# Patient Record
Sex: MALE | Race: WHITE | NOT HISPANIC OR LATINO | Employment: OTHER | URBAN - NONMETROPOLITAN AREA
[De-identification: names, ages, dates, MRNs, and addresses within clinical notes are randomized per-mention and may not be internally consistent; named-entity substitution may affect disease eponyms.]

---

## 2017-03-01 ENCOUNTER — OFFICE VISIT (OUTPATIENT)
Dept: FAMILY MEDICINE CLINIC | Facility: CLINIC | Age: 72
End: 2017-03-01

## 2017-03-01 VITALS
BODY MASS INDEX: 34.53 KG/M2 | HEART RATE: 53 BPM | RESPIRATION RATE: 18 BRPM | HEIGHT: 68 IN | OXYGEN SATURATION: 98 % | SYSTOLIC BLOOD PRESSURE: 130 MMHG | TEMPERATURE: 97.5 F | WEIGHT: 227.8 LBS | DIASTOLIC BLOOD PRESSURE: 82 MMHG

## 2017-03-01 DIAGNOSIS — E66.09 NON MORBID OBESITY DUE TO EXCESS CALORIES: ICD-10-CM

## 2017-03-01 DIAGNOSIS — I10 BENIGN ESSENTIAL HTN: ICD-10-CM

## 2017-03-01 DIAGNOSIS — Z11.59 NEED FOR HEPATITIS C SCREENING TEST: ICD-10-CM

## 2017-03-01 DIAGNOSIS — Z95.1 H/O HEART BYPASS SURGERY: ICD-10-CM

## 2017-03-01 DIAGNOSIS — E78.5 HYPERLIPIDEMIA, UNSPECIFIED HYPERLIPIDEMIA TYPE: ICD-10-CM

## 2017-03-01 DIAGNOSIS — E11.9 TYPE 2 DIABETES MELLITUS WITH HEMOGLOBIN A1C GOAL OF LESS THAN 7.0% (HCC): Primary | ICD-10-CM

## 2017-03-01 LAB — HBA1C MFR BLD: 8 %

## 2017-03-01 PROCEDURE — 83036 HEMOGLOBIN GLYCOSYLATED A1C: CPT | Performed by: FAMILY MEDICINE

## 2017-03-01 PROCEDURE — 99214 OFFICE O/P EST MOD 30 MIN: CPT | Performed by: FAMILY MEDICINE

## 2017-03-01 NOTE — PATIENT INSTRUCTIONS
Diabetes and Foot Care  Diabetes may cause you to have problems because of poor blood supply (circulation) to your feet and legs. This may cause the skin on your feet to become thinner, break easier, and heal more slowly. Your skin may become dry, and the skin may peel and crack. You may also have nerve damage in your legs and feet causing decreased feeling in them. You may not notice minor injuries to your feet that could lead to infections or more serious problems. Taking care of your feet is one of the most important things you can do for yourself.  HOME CARE INSTRUCTIONS  · Wear shoes at all times, even in the house. Do not go barefoot. Bare feet are easily injured.  · Check your feet daily for blisters, cuts, and redness. If you cannot see the bottom of your feet, use a mirror or ask someone for help.  · Wash your feet with warm water (do not use hot water) and mild soap. Then pat your feet and the areas between your toes until they are completely dry. Do not soak your feet as this can dry your skin.  · Apply a moisturizing lotion or petroleum jelly (that does not contain alcohol and is unscented) to the skin on your feet and to dry, brittle toenails. Do not apply lotion between your toes.  · Trim your toenails straight across. Do not dig under them or around the cuticle. File the edges of your nails with an emery board or nail file.  · Do not cut corns or calluses or try to remove them with medicine.  · Wear clean socks or stockings every day. Make sure they are not too tight. Do not wear knee-high stockings since they may decrease blood flow to your legs.  · Wear shoes that fit properly and have enough cushioning. To break in new shoes, wear them for just a few hours a day. This prevents you from injuring your feet. Always look in your shoes before you put them on to be sure there are no objects inside.  · Do not cross your legs. This may decrease the blood flow to your feet.  · If you find a minor scrape,  cut, or break in the skin on your feet, keep it and the skin around it clean and dry. These areas may be cleansed with mild soap and water. Do not cleanse the area with peroxide, alcohol, or iodine.  · When you remove an adhesive bandage, be sure not to damage the skin around it.  · If you have a wound, look at it several times a day to make sure it is healing.  · Do not use heating pads or hot water bottles. They may burn your skin. If you have lost feeling in your feet or legs, you may not know it is happening until it is too late.  · Make sure your health care provider performs a complete foot exam at least annually or more often if you have foot problems. Report any cuts, sores, or bruises to your health care provider immediately.  SEEK MEDICAL CARE IF:   · You have an injury that is not healing.  · You have cuts or breaks in the skin.  · You have an ingrown nail.  · You notice redness on your legs or feet.  · You feel burning or tingling in your legs or feet.  · You have pain or cramps in your legs and feet.  · Your legs or feet are numb.  · Your feet always feel cold.  SEEK IMMEDIATE MEDICAL CARE IF:   · There is increasing redness, swelling, or pain in or around a wound.  · There is a red line that goes up your leg.  · Pus is coming from a wound.  · You develop a fever or as directed by your health care provider.  · You notice a bad smell coming from an ulcer or wound.     This information is not intended to replace advice given to you by your health care provider. Make sure you discuss any questions you have with your health care provider.     Document Released: 12/15/2001 Document Revised: 11/28/2016 Document Reviewed: 05/27/2014  [employer] Interactive Patient Education ©2016 [employer]  Sitagliptin oral tablet  What is this medicine?  SITAGLIPTIN (sit a GLIP tin) helps to treat type 2 diabetes. It helps to control blood sugar. Treatment is combined with diet and exercise.  This medicine may be used for  other purposes; ask your health care provider or pharmacist if you have questions.  COMMON BRAND NAME(S): Januvia  What should I tell my health care provider before I take this medicine?  They need to know if you have any of these conditions:  -diabetic ketoacidosis  -kidney disease  -pancreatitis  -previous swelling of the tongue, face, or lips with difficulty breathing, difficulty swallowing, hoarseness, or tightening of the throat  -type 1 diabetes  -an unusual or allergic reaction to sitagliptin, other medicines, foods, dyes, or preservatives  -pregnant or trying to get pregnant  -breast-feeding  How should I use this medicine?  Take this medicine by mouth with a glass of water. Follow the directions on the prescription label. You can take it with or without food. Do not cut, crush or chew this medicine. Take your dose at the same time each day. Do not take more often than directed. Do not stop taking except on your doctor's advice.  Talk to your pediatrician regarding the use of this medicine in children. Special care may be needed.  Overdosage: If you think you have taken too much of this medicine contact a poison control center or emergency room at once.  NOTE: This medicine is only for you. Do not share this medicine with others.  What if I miss a dose?  If you miss a dose, take it as soon as you can. If it is almost time for your next dose, take only that dose. Do not take double or extra doses.  What may interact with this medicine?  Do not take this medicine with any of the following medications:  -gatifloxacin  This medicine may also interact with the following medications:  -alcohol  -digoxin  -insulin  -sulfonylureas like glimepiride, glipizide, glyburide  This list may not describe all possible interactions. Give your health care provider a list of all the medicines, herbs, non-prescription drugs, or dietary supplements you use. Also tell them if you smoke, drink alcohol, or use illegal drugs. Some  items may interact with your medicine.  What should I watch for while using this medicine?  Visit your doctor or health care professional for regular checks on your progress.  A test called the HbA1C (A1C) will be monitored. This is a simple blood test. It measures your blood sugar control over the last 2 to 3 months. You will receive this test every 3 to 6 months.  Learn how to check your blood sugar. Learn the symptoms of low and high blood sugar and how to manage them.  Always carry a quick-source of sugar with you in case you have symptoms of low blood sugar. Examples include hard sugar candy or glucose tablets. Make sure others know that you can choke if you eat or drink when you develop serious symptoms of low blood sugar, such as seizures or unconsciousness. They must get medical help at once.  Tell your doctor or health care professional if you have high blood sugar. You might need to change the dose of your medicine. If you are sick or exercising more than usual, you might need to change the dose of your medicine.  Do not skip meals. Ask your doctor or health care professional if you should avoid alcohol. Many nonprescription cough and cold products contain sugar or alcohol. These can affect blood sugar.  Wear a medical ID bracelet or chain, and carry a card that describes your disease and details of your medicine and dosage times.  What side effects may I notice from receiving this medicine?  Side effects that you should report to your doctor or health care professional as soon as possible:  -allergic reactions like skin rash, itching or hives, swelling of the face, lips, or tongue  -breathing problems  -fever, chills  -joint pain  -loss of appetite  -signs and symptoms of low blood sugar such as feeling anxious, confusion, dizziness, increased hunger, unusually weak or tired, sweating, shakiness, cold, irritable, headache, blurred vision, fast heartbeat, loss of consciousness  -unusual stomach pain or  discomfort  -vomiting  Side effects that usually do not require medical attention (report to your doctor or health care professional if they continue or are bothersome):  -diarrhea  -headache  -sore throat  -stomach upset  -stuffy or runny nose  This list may not describe all possible side effects. Call your doctor for medical advice about side effects. You may report side effects to FDA at 6-668-UZC-5915.  Where should I keep my medicine?  Keep out of the reach of children.  Store at room temperature between 15 and 30 degrees C (59 and 86 degrees F). Throw away any unused medicine after the expiration date.  NOTE: This sheet is a summary. It may not cover all possible information. If you have questions about this medicine, talk to your doctor, pharmacist, or health care provider.     © 2016, [employer]. (2015-08-28 17:46:21)    Exercising to Lose Weight  Exercising can help you to lose weight. In order to lose weight through exercise, you need to do vigorous-intensity exercise. You can tell that you are exercising with vigorous intensity if you are breathing very hard and fast and cannot hold a conversation while exercising.  Moderate-intensity exercise helps to maintain your current weight. You can tell that you are exercising at a moderate level if you have a higher heart rate and faster breathing, but you are still able to hold a conversation.  HOW OFTEN SHOULD I EXERCISE?  Choose an activity that you enjoy and set realistic goals. Your health care provider can help you to make an activity plan that works for you. Exercise regularly as directed by your health care provider. This may include:  · Doing resistance training twice each week, such as:    Push-ups.    Sit-ups.    Lifting weights.    Using resistance bands.  · Doing a given intensity of exercise for a given amount of time. Choose from these options:    150 minutes of moderate-intensity exercise every week.    75 minutes of vigorous-intensity  exercise every week.    A mix of moderate-intensity and vigorous-intensity exercise every week.  Children, pregnant women, people who are out of shape, people who are overweight, and older adults may need to consult a health care provider for individual recommendations. If you have any sort of medical condition, be sure to consult your health care provider before starting a new exercise program.  WHAT ARE SOME ACTIVITIES THAT CAN HELP ME TO LOSE WEIGHT?   · Walking at a rate of at least 4.5 miles an hour.  · Jogging or running at a rate of 5 miles per hour.  · Biking at a rate of at least 10 miles per hour.  · Lap swimming.  · Roller-skating or in-line skating.  · Cross-country skiing.  · Vigorous competitive sports, such as football, basketball, and soccer.  · Jumping rope.  · Aerobic dancing.  HOW CAN I BE MORE ACTIVE IN MY DAY-TO-DAY ACTIVITIES?  · Use the stairs instead of the elevator.  · Take a walk during your lunch break.  · If you drive, park your car farther away from work or school.  · If you take public transportation, get off one stop early and walk the rest of the way.  · Make all of your phone calls while standing up and walking around.  · Get up, stretch, and walk around every 30 minutes throughout the day.  WHAT GUIDELINES SHOULD I FOLLOW WHILE EXERCISING?  · Do not exercise so much that you hurt yourself, feel dizzy, or get very short of breath.  · Consult your health care provider prior to starting a new exercise program.  · Wear comfortable clothes and shoes with good support.  · Drink plenty of water while you exercise to prevent dehydration or heat stroke. Body water is lost during exercise and must be replaced.  · Work out until you breathe faster and your heart beats faster.     This information is not intended to replace advice given to you by your health care provider. Make sure you discuss any questions you have with your health care provider.     Document Released: 01/20/2012 Document  Revised: 01/08/2016 Document Reviewed: 05/21/2015  [employer] Interactive Patient Education ©2016 [employer]  Calorie Counting for Weight Loss  Calories are energy you get from the things you eat and drink. Your body uses this energy to keep you going throughout the day. The number of calories you eat affects your weight. When you eat more calories than your body needs, your body stores the extra calories as fat. When you eat fewer calories than your body needs, your body burns fat to get the energy it needs.  Calorie counting means keeping track of how many calories you eat and drink each day. If you make sure to eat fewer calories than your body needs, you should lose weight. In order for calorie counting to work, you will need to eat the number of calories that are right for you in a day to lose a healthy amount of weight per week. A healthy amount of weight to lose per week is usually 1-2 lb (0.5-0.9 kg). A dietitian can determine how many calories you need in a day and give you suggestions on how to reach your calorie goal.   WHAT IS MY MY PLAN?  My goal is to have __________ calories per day.   If I have this many calories per day, I should lose around __________ pounds per week.  WHAT DO I NEED TO KNOW ABOUT CALORIE COUNTING?  In order to meet your daily calorie goal, you will need to:  · Find out how many calories are in each food you would like to eat. Try to do this before you eat.  · Decide how much of the food you can eat.  · Write down what you ate and how many calories it had. Doing this is called keeping a food log.  WHERE DO I FIND CALORIE INFORMATION?  The number of calories in a food can be found on a Nutrition Facts label. Note that all the information on a label is based on a specific serving of the food. If a food does not have a Nutrition Facts label, try to look up the calories online or ask your dietitian for help.  HOW DO I DECIDE HOW MUCH TO EAT?  To decide how much of the food you can  eat, you will need to consider both the number of calories in one serving and the size of one serving. This information can be found on the Nutrition Facts label. If a food does not have a Nutrition Facts label, look up the information online or ask your dietitian for help.  Remember that calories are listed per serving. If you choose to have more than one serving of a food, you will have to multiply the calories per serving by the amount of servings you plan to eat. For example, the label on a package of bread might say that a serving size is 1 slice and that there are 90 calories in a serving. If you eat 1 slice, you will have eaten 90 calories. If you eat 2 slices, you will have eaten 180 calories.  HOW DO I KEEP A FOOD LOG?  After each meal, record the following information in your food log:  · What you ate.  · How much of it you ate.  · How many calories it had.  · Then, add up your calories.  Keep your food log near you, such as in a small notebook in your pocket. Another option is to use a mobile liz or website. Some programs will calculate calories for you and show you how many calories you have left each time you add an item to the log.  WHAT ARE SOME CALORIE COUNTING TIPS?  · Use your calories on foods and drinks that will fill you up and not leave you hungry. Some examples of this include foods like nuts and nut butters, vegetables, lean proteins, and high-fiber foods (more than 5 g fiber per serving).  · Eat nutritious foods and avoid empty calories. Empty calories are calories you get from foods or beverages that do not have many nutrients, such as candy and soda. It is better to have a nutritious high-calorie food (such as an avocado) than a food with few nutrients (such as a bag of chips).  · Know how many calories are in the foods you eat most often. This way, you do not have to look up how many calories they have each time you eat them.  · Look out for foods that may seem like low-calorie foods but  are really high-calorie foods, such as baked goods, soda, and fat-free candy.  · Pay attention to calories in drinks. Drinks such as sodas, specialty coffee drinks, alcohol, and juices have a lot of calories yet do not fill you up. Choose low-calorie drinks like water and diet drinks.  · Focus your calorie counting efforts on higher calorie items. Logging the calories in a garden salad that contains only vegetables is less important than calculating the calories in a milk shake.  · Find a way of tracking calories that works for you. Get creative. Most people who are successful find ways to keep track of how much they eat in a day, even if they do not count every calorie.  WHAT ARE SOME PORTION CONTROL TIPS?  · Know how many calories are in a serving. This will help you know how many servings of a certain food you can have.  · Use a measuring cup to measure serving sizes. This is helpful when you start out. With time, you will be able to estimate serving sizes for some foods.  · Take some time to put servings of different foods on your favorite plates, bowls, and cups so you know what a serving looks like.  · Try not to eat straight from a bag or box. Doing this can lead to overeating. Put the amount you would like to eat in a cup or on a plate to make sure you are eating the right portion.  · Use smaller plates, glasses, and bowls to prevent overeating. This is a quick and easy way to practice portion control. If your plate is smaller, less food can fit on it.  · Try not to multitask while eating, such as watching TV or using your computer. If it is time to eat, sit down at a table and enjoy your food. Doing this will help you to start recognizing when you are full. It will also make you more aware of what and how much you are eating.  HOW CAN I CALORIE COUNT WHEN EATING OUT?  · Ask for smaller portion sizes or child-sized portions.  · Consider sharing an entree and sides instead of getting your own entree.  · If  "you get your own entree, eat only half. Ask for a box at the beginning of your meal and put the rest of your entree in it so you are not tempted to eat it.  · Look for the calories on the menu. If calories are listed, choose the lower calorie options.  · Choose dishes that include vegetables, fruits, whole grains, low-fat dairy products, and lean protein. Focusing on smart food choices from each of the 5 food groups can help you stay on track at restaurants.  · Choose items that are boiled, broiled, grilled, or steamed.  · Choose water, milk, unsweetened iced tea, or other drinks without added sugars. If you want an alcoholic beverage, choose a lower calorie option. For example, a regular gaye can have up to 700 calories and a glass of wine has around 150.  · Stay away from items that are buttered, battered, fried, or served with cream sauce. Items labeled \"crispy\" are usually fried, unless stated otherwise.  · Ask for dressings, sauces, and syrups on the side. These are usually very high in calories, so do not eat much of them.  · Watch out for salads. Many people think salads are a healthy option, but this is often not the case. Many salads come with cota, fried chicken, lots of cheese, fried chips, and dressing. All of these items have a lot of calories. If you want a salad, choose a garden salad and ask for grilled meats or steak. Ask for the dressing on the side, or ask for olive oil and vinegar or lemon to use as dressing.  · Estimate how many servings of a food you are given. For example, a serving of cooked rice is ½ cup or about the size of half a tennis ball or one cupcake wrapper. Knowing serving sizes will help you be aware of how much food you are eating at restaurants. The list below tells you how big or small some common portion sizes are based on everyday objects.    1 oz--4 stacked dice.    3 oz--1 deck of cards.    1 tsp--1 dice.    1 Tbsp--½ a Ping-Pong ball.    2 Tbsp--1 Ping-Pong ball.   "  ½ cup--1 tennis ball or 1 cupcake wrapper.    1 cup--1 baseball.     This information is not intended to replace advice given to you by your health care provider. Make sure you discuss any questions you have with your health care provider.     Document Released: 12/18/2006 Document Revised: 01/08/2016 Document Reviewed: 10/23/2014  [employer] Interactive Patient Education ©2016 [employer]

## 2017-03-01 NOTE — PROGRESS NOTES
Chief Complaint   Patient presents with   • Follow-up     patient said there were no problems today.        History:  Cordell Carreon is a 71 y.o. male presents who today for evaluation of the above problems.  PCP currently listed as Domenico Hui MD.   DM:  A1C today is 8.0.  Mostly in 170s at home.   We discussed today options for glucose control. Discussed sulfonylureas, discussed januvia and jardiance. Discussed injectable agents tanzeum and trulicity as well.  He is not ready for shots.  Weight is stable.  Up ~1 lb from last OV.  He has chosen to try januvia. R/B/A to meds d/w patient, SE reviewed, handout offered regarding medications listed.     He is on ace-I.  Eye MD appt in past 12 months 05-06/2017. Dr. Valenzuela.  This was 5/31/16.  He will be due again in may of this year.  Pneumonia vaccine is up to date.  BP is stable at <140/90.  He is on high dose lipitor.  Baby ASA.  BB is present.  Intermittent aleve.  Obesity remains present.  HTN stable, Hyperlipidemia stable, DM unstable.     Cordell Carreon  has a past medical history of Diabetes mellitus; Hyperlipidemia; and Hypertension.    No Known Allergies  Past Medical History   Diagnosis Date   • Diabetes mellitus    • Hyperlipidemia    • Hypertension      Past Surgical History   Procedure Laterality Date   • Coronary artery bypass graft       Family History   Problem Relation Age of Onset   • Dementia Mother    • Heart attack Father    • No Known Problems Sister    • No Known Problems Daughter    • No Known Problems Son    • No Known Problems Maternal Grandmother    • Heart attack Maternal Grandfather    • No Known Problems Paternal Grandmother    • No Known Problems Paternal Grandfather          Current Outpatient Prescriptions:   •  amLODIPine-benazepril (LOTREL) 10-20 MG per capsule, Daily., Disp: , Rfl:   •  aspirin 81 MG EC tablet, Take 81 mg by mouth Daily., Disp: , Rfl:   •  atorvastatin (LIPITOR) 80 MG tablet, Daily., Disp: , Rfl:   •   "metFORMIN (GLUCOPHAGE) 1000 MG tablet, TAKE ONE TABLET BY MOUTH TWICE DAILY, Disp: 180 tablet, Rfl: 0  •  metoprolol tartrate (LOPRESSOR) 50 MG tablet, 2 (Two) Times a Day., Disp: , Rfl:   •  naproxen sodium (ALEVE) 220 MG tablet, Take 220 mg by mouth Daily., Disp: , Rfl:   •  SITagliptin (JANUVIA) 100 MG tablet, Take 1 tablet by mouth Daily., Disp: 30 tablet, Rfl: 5    ROS:  Review of Systems   Constitutional: Negative for activity change, appetite change and chills.   HENT: Negative for congestion, dental problem and drooling.    Eyes: Negative for pain, discharge and itching.   Respiratory: Negative for apnea, choking and chest tightness.    Cardiovascular: Negative for chest pain, palpitations and leg swelling.   Gastrointestinal: Negative for abdominal distention, abdominal pain and anal bleeding.   Endocrine: Negative for cold intolerance, heat intolerance and polydipsia.   Genitourinary: Negative for difficulty urinating, dysuria and enuresis.   Musculoskeletal: Negative for arthralgias, back pain and gait problem.   Skin: Negative for color change, pallor and rash.   Neurological: Negative for dizziness, facial asymmetry and headaches.   Hematological: Negative for adenopathy. Does not bruise/bleed easily.   Psychiatric/Behavioral: Negative for agitation, behavioral problems and confusion.       OBJECTIVE:  Visit Vitals   • /82 (BP Location: Left arm, Patient Position: Sitting, Cuff Size: Large Adult)   • Pulse 53   • Temp 97.5 °F (36.4 °C) (Oral)   • Resp 18   • Ht 68\" (172.7 cm)   • Wt 227 lb 12.8 oz (103 kg)   • SpO2 98%   • BMI 34.64 kg/m2      Physical Exam   Constitutional: He is oriented to person, place, and time. He appears well-developed and well-nourished.   HENT:   Head: Normocephalic and atraumatic.   Right Ear: External ear normal.   Left Ear: External ear normal.   Nose: Nose normal.   Mouth/Throat: Oropharynx is clear and moist.   Eyes: Conjunctivae and EOM are normal. Pupils are equal, " round, and reactive to light.   Neck: Normal range of motion. Neck supple. No thyromegaly present.   Cardiovascular: Normal rate, regular rhythm, normal heart sounds and intact distal pulses.    No murmur heard.  Pulmonary/Chest: Effort normal and breath sounds normal. No respiratory distress. He has no wheezes. He exhibits no tenderness.   Abdominal: Soft. Bowel sounds are normal. There is no tenderness. There is no rebound and no guarding.   Musculoskeletal: Normal range of motion. He exhibits no tenderness.    Cordell JAMIL had a diabetic foot exam performed today.   During the foot exam he had a monofilament test performed.    Vascular Status -  His exam exhibits right foot vasculature normal. His exam exhibits no right foot edema. His exam exhibits left foot vasculature normal. His exam exhibits no left foot edema.   Skin Integrity  -  His right foot skin is intact.  He has right foot warmth.  He has no right foot onychomycosis, no right foot ulcer, non-callous right foot,  no ingrown toenail on right foot, right heel is not dry and cracked and no right foot blister.    Cordell JAMIL 's left foot skin is intact. He has left foot warmth. He has no left foot onychomycosis, no left foot ulcer, non-callous left foot, no left ingrown toenail, no left heel dry and cracked and no left foot blister..   Foot Structure and Biomechanics -  His right foot has no hallux valgus, no claw toes and no hammer toes present. His left foot has no hallux valgus, no claw toes and no hammer toes.      Lymphadenopathy:     He has no cervical adenopathy.   Neurological: He is alert and oriented to person, place, and time. No cranial nerve deficit. Coordination normal.   Skin: Skin is warm and dry. No rash noted.   Psychiatric: He has a normal mood and affect. His behavior is normal. Judgment and thought content normal.   Nursing note and vitals reviewed.    Diabetic foot exam:   Left: Reflexes 2+    Vibratory sensation normal   Proprioception  normal   Sharp/dull discrimination normal   Filament test present 10/10 normal  Right: Reflexes 2+    Vibratory sensation normal   Proprioception normal   Sharp/dull discrimination normal   Filament test present  10/10 normal      Assessment/Plan:  Diabetes Type 2:  poorly controlled, needs to follow diet more regularly and a1c 8.0.. Insulin non dependent. Nephropathy, Retinopathy, Neuropahty status discussed.  Reviewed goals of Diabetes today.  The Goal is to have an Hgb A1C <7.0 for most patients.  Good BP control is encouraged with Goal BP based on JNC 8 guidelines 2014 of SBP <140 and DBP <90.  At goal: yes.  Discussed role of Ace-I and ARB with DM.  DM imparts risk equivalence for CAD based on ATP III.  Current guidelines support moderate intensity statin with goal of 30-50% reduction in LDL unless 10 yr risk ASCVD >7.5 then high intensity should be used. Close monitoring of Lipid levels encouraged. Recommend once yearly eye evaluation by optometry or ophthalmology.  Good foot health discussed and foot exam completed today and listed in examination.  Recommended to monitor toe health, wear good shoes, cut nails straight across and tend calluses as listed.  Take medications as encouraged.  Monitor blood sugars as encouraged and bring log to future meetings. Weight needs to be monitored. Monitor portions and caloric intake.    · Pneumovax frequency discussed.    · Patients <65 years old should have PPSV 23 q 5 years x 2 up to age 65 then once after age 65.    · Prevnar at age 65 and then 1 year later PPSV 23.    · Recent data suggest there may be a link between metformin and B12.  D/W patient to consider evaluation for b12 deficiency.  · Recommend yearly microalbumin  · Yearly eye evaluation  · Discussed importance of Ace-I and ARB  · Monitor portions  · Work on regular exercise and diet to keep BMI in appropriate range.   · Lab review: orders written for new lab studies as appropriate; see orders.   · See orders  for this visit as documented in the electronic medical record. Diabetic issues reviewed with male: home glucose monitoring emphasized, all medications, side effects and compliance discussed carefully, foot care discussed and Podiatry visits discussed, annual eye examinations at Ophthalmology discussed, glycohemoglobin and other lab monitoring discussed and long term diabetic complications discussed.  Today we discussed this and benefits of oral medications.  We discussed the sulfonylurea we discussed the DPP-IV, SGLT-2, GLP-1.   He is not interested in anything injectable.  We reviewed side effects, we reviewed cost.  We also discussed his weight and recommended an agent that would help him with weight loss.  We talked about Jaridance specifically as well as Januvia specifically for agents that would be beneficial and sulfonylureas lead to weight gain he is already on metformin.  I also specifically talked about tanzeum, and trulicity but again he does not want anything injectable.  Patient shows Januvia.  Samples were given to him today.  Prescription was sent to his pharmacy.  Follow-up in 3 months I would like her blood sugar log in one month.     HTN: Suspect essential HTN. Good BP control is encouraged with Goal BP based on JNC 8 guidelines:  For the general population <60 yr old goal BP <140/90 and for those >60 <150/90.  For patients of all ages with Diabetes, CKD, Known CAD the goal is <140/90. Reviewed typical first line agents to include thiazide diuretic or ace-I or ARB or CCB alone or in combo. At goal yes.  If not already owned, I recommended to the patient to obtain electronic home BP machine with upper arm blood pressure cuff and to check regularly as instructed.  Keep BP log and bring to subsequent visits.    · Offered handout on HTN educational topics: HTN, Low sodium diet.   These were provided if patient requested these today.  · ADA diet encouraged.  · Monitor cholesterol regularly  · Monitor  weight with goal of BMI <30.  · Consider taking Rx at night as recent study supports this may decreased chances of leading to DM.   · Consider bringing home BP cuff to office to compare readings with ours.  · Checking BP at home can help to lower BP.  Certain medications and substances can increase BP to include: NSAIDS, caffeine, decongestants, nicotine.    · Well controlled and BP goal for age is <140/90 per JNC 8 guidelines      Hyperlipidemia: Status unknown.  On high dose lipitor.  Will repeat labs and f/u .    Obesity: Discussed the federal guidelines suggest a healthy goal BMI of 18.5-24.9 for people 18-65 and 23-30 for people age 65 and older.  Overweight is considered BMI 25-30, Obesity 30-40 and Morbid obesity is defined as >100 lb overweight or BMI >40.  With a BMI above goal, it is recommended to utilize a diet/exercise program to get back into the appropriate range.  Consider referral to dietician.  For BMI >40 consider referral to bariatrics.    · If not already monitoring intake.  I would recommend at least to keep a food diary.  Document everything that is consumed into a food diary.  Studies have shown that patients can lose up to 2x the weight by keeping track of foods.    · Offered handout on weight loss techniques.   · Apps that may be of benefit include [employer], Lose it.    · Regular exercise encouraged.  Start with walking 5-10 minutes at a pace that is difficult to carry a conversation. If chest pain/SOA stop and f/u in office.    Declined evaluation by dietitian or by bariatrics.  Portion control highly encouraged    Patient is on aspirin as well as beta blocker although he is not on one of the cardioselective cardioprotective beta blockers.  I would consider switching him to Coreg in the future.        Orders Placed Today:  Cordell JAMIL was seen today for follow-up.    Diagnoses and all orders for this visit:    Type 2 diabetes mellitus with hemoglobin A1c goal of less than 7.0%  -      SITagliptin (JANUVIA) 100 MG tablet; Take 1 tablet by mouth Daily.  -     Comprehensive Metabolic Panel  -     CBC Auto Differential  -     Lipid Panel  -     POC Glycosylated Hemoglobin (Hb A1C)  -     MicroAlbumin, Urine, Random    Need for hepatitis C screening test  -     Hepatitis C Antibody    H/O heart bypass surgery    Benign essential HTN    Hyperlipidemia, unspecified hyperlipidemia type  -     Comprehensive Metabolic Panel  -     Lipid Panel    Other orders  -     Discontinue: SITagliptin (JANUVIA) 100 MG tablet; Take 1 tablet by mouth Daily.        Risks/benefits of current and new medications discussed with the patient and or family today.  The patient/family are aware and accept that if there any side effects they should call or return to clinic as soon as possible.  Appropriate F/U discussed for topics addressed today. All questions were answered to the satisfactory state of patient/family.  Should symptoms fail to improve or worsen they agree to call or return to clinic or to go to the ER. Education handouts were offered on any new Rx if requested.  Discussed the importance of following up with any needed screening tests/labs/specialist appointments and any requested follow-up recommended by me today.  Importance of maintaining follow-up discussed and patient accepts that missed appointments can delay diagnosis and potentially lead to worsening of conditions.    An After Visit Summary was printed and given to the patient at discharge.  Return in about 3 months (around 6/1/2017).         Domenico Hui M.D. 3/2/2017

## 2017-03-02 LAB
ALBUMIN SERPL-MCNC: 4.2 G/DL (ref 3.5–5)
ALBUMIN/GLOB SERPL: 1.4 G/DL (ref 1.1–2.5)
ALP SERPL-CCNC: 70 U/L (ref 24–120)
ALT SERPL-CCNC: 51 U/L (ref 0–54)
AST SERPL-CCNC: 36 U/L (ref 7–45)
BASOPHILS # BLD AUTO: 0.03 10*3/MM3 (ref 0–0.2)
BASOPHILS NFR BLD AUTO: 0.4 % (ref 0–2)
BILIRUB SERPL-MCNC: 1 MG/DL (ref 0.1–1)
BUN SERPL-MCNC: 13 MG/DL (ref 5–21)
BUN/CREAT SERPL: 11.1 (ref 7–25)
CALCIUM SERPL-MCNC: 9.6 MG/DL (ref 8.4–10.4)
CHLORIDE SERPL-SCNC: 103 MMOL/L (ref 98–110)
CHOLEST SERPL-MCNC: 216 MG/DL (ref 130–200)
CO2 SERPL-SCNC: 25 MMOL/L (ref 24–31)
CREAT SERPL-MCNC: 1.17 MG/DL (ref 0.5–1.4)
EOSINOPHIL # BLD AUTO: 0.23 10*3/MM3 (ref 0–0.7)
EOSINOPHIL NFR BLD AUTO: 2.8 % (ref 0–4)
ERYTHROCYTE [DISTWIDTH] IN BLOOD BY AUTOMATED COUNT: 13.6 % (ref 12–15)
GLOBULIN SER CALC-MCNC: 3.1 GM/DL
GLUCOSE SERPL-MCNC: 165 MG/DL (ref 70–100)
HCT VFR BLD AUTO: 41.9 % (ref 40–52)
HDLC SERPL-MCNC: 35 MG/DL
HGB BLD-MCNC: 14.3 G/DL (ref 14–18)
IMM GRANULOCYTES # BLD: 0.02 10*3/MM3 (ref 0–0.03)
IMM GRANULOCYTES NFR BLD: 0.2 % (ref 0–5)
LDLC SERPL CALC-MCNC: 143 MG/DL (ref 0–99)
LYMPHOCYTES # BLD AUTO: 2.48 10*3/MM3 (ref 0.72–4.86)
LYMPHOCYTES NFR BLD AUTO: 30.5 % (ref 15–45)
MCH RBC QN AUTO: 28.5 PG (ref 28–32)
MCHC RBC AUTO-ENTMCNC: 34.1 G/DL (ref 33–36)
MCV RBC AUTO: 83.5 FL (ref 82–95)
MICROALBUMIN UR-MCNC: 9.9 UG/ML
MONOCYTES # BLD AUTO: 0.86 10*3/MM3 (ref 0.19–1.3)
MONOCYTES NFR BLD AUTO: 10.6 % (ref 4–12)
NEUTROPHILS # BLD AUTO: 4.5 10*3/MM3 (ref 1.87–8.4)
NEUTROPHILS NFR BLD AUTO: 55.5 % (ref 39–78)
PLATELET # BLD AUTO: 226 10*3/MM3 (ref 130–400)
POTASSIUM SERPL-SCNC: 4.7 MMOL/L (ref 3.5–5.3)
PROT SERPL-MCNC: 7.3 G/DL (ref 6.3–8.7)
RBC # BLD AUTO: 5.02 10*6/MM3 (ref 4.8–5.9)
SODIUM SERPL-SCNC: 141 MMOL/L (ref 135–145)
TRIGL SERPL-MCNC: 192 MG/DL (ref 0–149)
VLDLC SERPL CALC-MCNC: 38.4 MG/DL
WBC # BLD AUTO: 8.12 10*3/MM3 (ref 4.8–10.8)

## 2017-04-18 ENCOUNTER — OFFICE VISIT (OUTPATIENT)
Dept: GASTROENTEROLOGY | Age: 72
End: 2017-04-18
Payer: MEDICARE

## 2017-04-18 VITALS
HEIGHT: 68 IN | SYSTOLIC BLOOD PRESSURE: 120 MMHG | HEART RATE: 52 BPM | WEIGHT: 227 LBS | DIASTOLIC BLOOD PRESSURE: 78 MMHG | BODY MASS INDEX: 34.4 KG/M2 | OXYGEN SATURATION: 97 %

## 2017-04-18 DIAGNOSIS — Z86.010 HISTORY OF COLON POLYPS: Primary | ICD-10-CM

## 2017-04-18 PROBLEM — Z86.0100 HISTORY OF COLON POLYPS: Status: ACTIVE | Noted: 2017-04-18

## 2017-04-18 PROCEDURE — 3017F COLORECTAL CA SCREEN DOC REV: CPT | Performed by: NURSE PRACTITIONER

## 2017-04-18 PROCEDURE — G8427 DOCREV CUR MEDS BY ELIG CLIN: HCPCS | Performed by: NURSE PRACTITIONER

## 2017-04-18 PROCEDURE — 1036F TOBACCO NON-USER: CPT | Performed by: NURSE PRACTITIONER

## 2017-04-18 PROCEDURE — G8417 CALC BMI ABV UP PARAM F/U: HCPCS | Performed by: NURSE PRACTITIONER

## 2017-04-18 PROCEDURE — 1123F ACP DISCUSS/DSCN MKR DOCD: CPT | Performed by: NURSE PRACTITIONER

## 2017-04-18 PROCEDURE — 99203 OFFICE O/P NEW LOW 30 MIN: CPT | Performed by: NURSE PRACTITIONER

## 2017-04-18 PROCEDURE — 4040F PNEUMOC VAC/ADMIN/RCVD: CPT | Performed by: NURSE PRACTITIONER

## 2017-04-18 RX ORDER — AMLODIPINE BESYLATE AND BENAZEPRIL HYDROCHLORIDE 10; 20 MG/1; MG/1
1 CAPSULE ORAL DAILY
COMMUNITY

## 2017-04-18 ASSESSMENT — ENCOUNTER SYMPTOMS
ABDOMINAL PAIN: 0
NAUSEA: 0
BLOOD IN STOOL: 0
VOICE CHANGE: 0
ABDOMINAL DISTENTION: 0
DIARRHEA: 0
CONSTIPATION: 0
SORE THROAT: 0
CHEST TIGHTNESS: 0
BACK PAIN: 0
VOMITING: 0
COUGH: 0
SHORTNESS OF BREATH: 0
RECTAL PAIN: 0

## 2017-04-28 RX ORDER — ATORVASTATIN CALCIUM 80 MG/1
80 TABLET, FILM COATED ORAL NIGHTLY
Qty: 90 TABLET | Refills: 0 | Status: SHIPPED | OUTPATIENT
Start: 2017-04-28 | End: 2017-07-27 | Stop reason: SDUPTHER

## 2017-04-28 RX ORDER — METOPROLOL TARTRATE 50 MG/1
50 TABLET, FILM COATED ORAL 2 TIMES DAILY
Qty: 180 TABLET | Refills: 0 | Status: SHIPPED | OUTPATIENT
Start: 2017-04-28 | End: 2017-07-27 | Stop reason: SDUPTHER

## 2017-04-28 RX ORDER — AMLODIPINE BESYLATE AND BENAZEPRIL HYDROCHLORIDE 10; 20 MG/1; MG/1
1 CAPSULE ORAL DAILY
Qty: 90 CAPSULE | Refills: 0 | Status: SHIPPED | OUTPATIENT
Start: 2017-04-28 | End: 2017-07-27 | Stop reason: SDUPTHER

## 2017-05-15 ENCOUNTER — ANESTHESIA EVENT (OUTPATIENT)
Dept: OPERATING ROOM | Age: 72
End: 2017-05-15

## 2017-05-18 ENCOUNTER — HOSPITAL ENCOUNTER (OUTPATIENT)
Age: 72
Setting detail: OUTPATIENT SURGERY
Discharge: HOME | End: 2017-05-18
Attending: INTERNAL MEDICINE | Admitting: INTERNAL MEDICINE
Payer: MEDICARE

## 2017-05-18 ENCOUNTER — ANESTHESIA (OUTPATIENT)
Dept: OPERATING ROOM | Age: 72
End: 2017-05-18

## 2017-05-18 VITALS
HEART RATE: 53 BPM | OXYGEN SATURATION: 97 % | BODY MASS INDEX: 33.49 KG/M2 | SYSTOLIC BLOOD PRESSURE: 120 MMHG | RESPIRATION RATE: 14 BRPM | HEIGHT: 68 IN | DIASTOLIC BLOOD PRESSURE: 79 MMHG | WEIGHT: 221 LBS | TEMPERATURE: 98 F

## 2017-05-18 VITALS — SYSTOLIC BLOOD PRESSURE: 110 MMHG | OXYGEN SATURATION: 93 % | DIASTOLIC BLOOD PRESSURE: 60 MMHG

## 2017-05-18 PROCEDURE — G8907 PT DOC NO EVENTS ON DISCHARG: HCPCS

## 2017-05-18 PROCEDURE — G8918 PT W/O PREOP ORDER IV AB PRO: HCPCS

## 2017-05-18 PROCEDURE — G0121 COLON CA SCRN NOT HI RSK IND: HCPCS

## 2017-05-18 PROCEDURE — G0121 COLON CA SCRN NOT HI RSK IND: HCPCS | Performed by: INTERNAL MEDICINE

## 2017-05-18 RX ORDER — MIDAZOLAM HYDROCHLORIDE 1 MG/ML
INJECTION INTRAMUSCULAR; INTRAVENOUS PRN
Status: DISCONTINUED | OUTPATIENT
Start: 2017-05-18 | End: 2017-05-18 | Stop reason: SDUPTHER

## 2017-05-18 RX ORDER — LIDOCAINE HYDROCHLORIDE 10 MG/ML
INJECTION, SOLUTION INFILTRATION; PERINEURAL PRN
Status: DISCONTINUED | OUTPATIENT
Start: 2017-05-18 | End: 2017-05-18 | Stop reason: SDUPTHER

## 2017-05-18 RX ORDER — PROMETHAZINE HYDROCHLORIDE 25 MG/ML
6.25 INJECTION, SOLUTION INTRAMUSCULAR; INTRAVENOUS
Status: DISCONTINUED | OUTPATIENT
Start: 2017-05-18 | End: 2017-05-18 | Stop reason: HOSPADM

## 2017-05-18 RX ORDER — DIPHENHYDRAMINE HYDROCHLORIDE 50 MG/ML
12.5 INJECTION INTRAMUSCULAR; INTRAVENOUS
Status: DISCONTINUED | OUTPATIENT
Start: 2017-05-18 | End: 2017-05-18 | Stop reason: HOSPADM

## 2017-05-18 RX ORDER — SODIUM CHLORIDE 9 MG/ML
INJECTION, SOLUTION INTRAVENOUS CONTINUOUS
Status: DISCONTINUED | OUTPATIENT
Start: 2017-05-18 | End: 2017-05-18 | Stop reason: HOSPADM

## 2017-05-18 RX ORDER — PROPOFOL 10 MG/ML
INJECTION, EMULSION INTRAVENOUS PRN
Status: DISCONTINUED | OUTPATIENT
Start: 2017-05-18 | End: 2017-05-18 | Stop reason: SDUPTHER

## 2017-05-18 RX ORDER — ONDANSETRON 2 MG/ML
4 INJECTION INTRAMUSCULAR; INTRAVENOUS
Status: DISCONTINUED | OUTPATIENT
Start: 2017-05-18 | End: 2017-05-18 | Stop reason: HOSPADM

## 2017-05-18 RX ADMIN — PROPOFOL 100 MG: 10 INJECTION, EMULSION INTRAVENOUS at 09:40

## 2017-05-18 RX ADMIN — SODIUM CHLORIDE: 9 INJECTION, SOLUTION INTRAVENOUS at 09:02

## 2017-05-18 RX ADMIN — LIDOCAINE HYDROCHLORIDE 40 MG: 10 INJECTION, SOLUTION INFILTRATION; PERINEURAL at 09:40

## 2017-05-18 RX ADMIN — MIDAZOLAM HYDROCHLORIDE 2 MG: 1 INJECTION INTRAMUSCULAR; INTRAVENOUS at 09:40

## 2017-05-30 RX ORDER — LANCETS 33 GAUGE
EACH MISCELLANEOUS
Refills: 0 | OUTPATIENT
Start: 2017-05-30

## 2017-06-01 ENCOUNTER — OFFICE VISIT (OUTPATIENT)
Dept: FAMILY MEDICINE CLINIC | Facility: CLINIC | Age: 72
End: 2017-06-01

## 2017-06-01 ENCOUNTER — TELEPHONE (OUTPATIENT)
Dept: FAMILY MEDICINE CLINIC | Facility: CLINIC | Age: 72
End: 2017-06-01

## 2017-06-01 VITALS
BODY MASS INDEX: 34.37 KG/M2 | HEIGHT: 68 IN | WEIGHT: 226.8 LBS | OXYGEN SATURATION: 98 % | SYSTOLIC BLOOD PRESSURE: 112 MMHG | DIASTOLIC BLOOD PRESSURE: 82 MMHG | RESPIRATION RATE: 18 BRPM | HEART RATE: 56 BPM | TEMPERATURE: 97.7 F

## 2017-06-01 VITALS
DIASTOLIC BLOOD PRESSURE: 82 MMHG | HEIGHT: 68 IN | RESPIRATION RATE: 18 BRPM | HEART RATE: 56 BPM | TEMPERATURE: 97.7 F | BODY MASS INDEX: 34.25 KG/M2 | SYSTOLIC BLOOD PRESSURE: 112 MMHG | OXYGEN SATURATION: 98 % | WEIGHT: 226 LBS

## 2017-06-01 DIAGNOSIS — Z00.00 ROUTINE GENERAL MEDICAL EXAMINATION AT HEALTH CARE FACILITY: Primary | ICD-10-CM

## 2017-06-01 DIAGNOSIS — E78.2 MIXED HYPERLIPIDEMIA: ICD-10-CM

## 2017-06-01 DIAGNOSIS — I10 BENIGN ESSENTIAL HTN: ICD-10-CM

## 2017-06-01 DIAGNOSIS — Z11.59 NEED FOR HEPATITIS C SCREENING TEST: ICD-10-CM

## 2017-06-01 DIAGNOSIS — E11.9 TYPE 2 DIABETES MELLITUS WITH HEMOGLOBIN A1C GOAL OF LESS THAN 7.0% (HCC): Primary | ICD-10-CM

## 2017-06-01 LAB — HBA1C MFR BLD: 7.5 %

## 2017-06-01 PROCEDURE — 99214 OFFICE O/P EST MOD 30 MIN: CPT | Performed by: FAMILY MEDICINE

## 2017-06-01 PROCEDURE — G0439 PPPS, SUBSEQ VISIT: HCPCS | Performed by: FAMILY MEDICINE

## 2017-06-01 PROCEDURE — 83036 HEMOGLOBIN GLYCOSYLATED A1C: CPT | Performed by: FAMILY MEDICINE

## 2017-06-01 RX ORDER — GLIMEPIRIDE 2 MG/1
2 TABLET ORAL
Qty: 30 TABLET | Refills: 5 | Status: SHIPPED | OUTPATIENT
Start: 2017-06-01 | End: 2017-11-28 | Stop reason: SDUPTHER

## 2017-06-01 NOTE — PATIENT INSTRUCTIONS
Exercising to Lose Weight  Exercising can help you to lose weight. In order to lose weight through exercise, you need to do vigorous-intensity exercise. You can tell that you are exercising with vigorous intensity if you are breathing very hard and fast and cannot hold a conversation while exercising.  Moderate-intensity exercise helps to maintain your current weight. You can tell that you are exercising at a moderate level if you have a higher heart rate and faster breathing, but you are still able to hold a conversation.  HOW OFTEN SHOULD I EXERCISE?  Choose an activity that you enjoy and set realistic goals. Your health care provider can help you to make an activity plan that works for you. Exercise regularly as directed by your health care provider. This may include:  · Doing resistance training twice each week, such as:    Push-ups.    Sit-ups.    Lifting weights.    Using resistance bands.  · Doing a given intensity of exercise for a given amount of time. Choose from these options:    150 minutes of moderate-intensity exercise every week.    75 minutes of vigorous-intensity exercise every week.    A mix of moderate-intensity and vigorous-intensity exercise every week.  Children, pregnant women, people who are out of shape, people who are overweight, and older adults may need to consult a health care provider for individual recommendations. If you have any sort of medical condition, be sure to consult your health care provider before starting a new exercise program.  WHAT ARE SOME ACTIVITIES THAT CAN HELP ME TO LOSE WEIGHT?   · Walking at a rate of at least 4.5 miles an hour.  · Jogging or running at a rate of 5 miles per hour.  · Biking at a rate of at least 10 miles per hour.  · Lap swimming.  · Roller-skating or in-line skating.  · Cross-country skiing.  · Vigorous competitive sports, such as football, basketball, and soccer.  · Jumping rope.  · Aerobic dancing.  HOW CAN I BE MORE ACTIVE IN MY DAY-TO-DAY  ACTIVITIES?  · Use the stairs instead of the elevator.  · Take a walk during your lunch break.  · If you drive, park your car farther away from work or school.  · If you take public transportation, get off one stop early and walk the rest of the way.  · Make all of your phone calls while standing up and walking around.  · Get up, stretch, and walk around every 30 minutes throughout the day.  WHAT GUIDELINES SHOULD I FOLLOW WHILE EXERCISING?  · Do not exercise so much that you hurt yourself, feel dizzy, or get very short of breath.  · Consult your health care provider prior to starting a new exercise program.  · Wear comfortable clothes and shoes with good support.  · Drink plenty of water while you exercise to prevent dehydration or heat stroke. Body water is lost during exercise and must be replaced.  · Work out until you breathe faster and your heart beats faster.     This information is not intended to replace advice given to you by your health care provider. Make sure you discuss any questions you have with your health care provider.     Document Released: 01/20/2012 Document Revised: 01/08/2016 Document Reviewed: 05/21/2015  [employer] Interactive Patient Education ©2017 [employer]    Fall Prevention in the Home   Falls can cause injuries and can affect people from all age groups. There are many simple things that you can do to make your home safe and to help prevent falls.  WHAT CAN I DO ON THE OUTSIDE OF MY HOME?  · Regularly repair the edges of walkways and driveways and fix any cracks.  · Remove high doorway thresholds.  · Trim any shrubbery on the main path into your home.  · Use bright outdoor lighting.  · Clear walkways of debris and clutter, including tools and rocks.  · Regularly check that handrails are securely fastened and in good repair. Both sides of any steps should have handrails.  · Install guardrails along the edges of any raised decks or porches.  · Have leaves, snow, and ice cleared  regularly.  · Use sand or salt on walkways during winter months.  · In the garage, clean up any spills right away, including grease or oil spills.  WHAT CAN I DO IN THE BATHROOM?  · Use night lights.  · Install grab bars by the toilet and in the tub and shower. Do not use towel bars as grab bars.  · Use non-skid mats or decals on the floor of the tub or shower.  · If you need to sit down while you are in the shower, use a plastic, non-slip stool.  · Keep the floor dry. Immediately clean up any water that spills on the floor.  · Remove soap buildup in the tub or shower on a regular basis.  · Attach bath mats securely with double-sided non-slip rug tape.  · Remove throw rugs and other tripping hazards from the floor.  WHAT CAN I DO IN THE BEDROOM?  · Use night lights.  · Make sure that a bedside light is easy to reach.  · Do not use oversized bedding that drapes onto the floor.  · Have a firm chair that has side arms to use for getting dressed.  · Remove throw rugs and other tripping hazards from the floor.  WHAT CAN I DO IN THE KITCHEN?   · Clean up any spills right away.  · Avoid walking on wet floors.  · Place frequently used items in easy-to-reach places.  · If you need to reach for something above you, use a sturdy step stool that has a grab bar.  · Keep electrical cables out of the way.  · Do not use floor polish or wax that makes floors slippery. If you have to use wax, make sure that it is non-skid floor wax.  · Remove throw rugs and other tripping hazards from the floor.  WHAT CAN I DO IN THE STAIRWAYS?  · Do not leave any items on the stairs.  · Make sure that there are handrails on both sides of the stairs. Fix handrails that are broken or loose. Make sure that handrails are as long as the stairways.  · Check any carpeting to make sure that it is firmly attached to the stairs. Fix any carpet that is loose or worn.  · Avoid having throw rugs at the top or bottom of stairways, or secure the rugs with carpet  tape to prevent them from moving.  · Make sure that you have a light switch at the top of the stairs and the bottom of the stairs. If you do not have them, have them installed.  WHAT ARE SOME OTHER FALL PREVENTION TIPS?  · Wear closed-toe shoes that fit well and support your feet. Wear shoes that have rubber soles or low heels.  · When you use a stepladder, make sure that it is completely opened and that the sides are firmly locked. Have someone hold the ladder while you are using it. Do not climb a closed stepladder.  · Add color or contrast paint or tape to grab bars and handrails in your home. Place contrasting color strips on the first and last steps.  · Use mobility aids as needed, such as canes, walkers, scooters, and crutches.  · Turn on lights if it is dark. Replace any light bulbs that burn out.  · Set up furniture so that there are clear paths. Keep the furniture in the same spot.  · Fix any uneven floor surfaces.  · Choose a carpet design that does not hide the edge of steps of a stairway.  · Be aware of any and all pets.  · Review your medicines with your healthcare provider. Some medicines can cause dizziness or changes in blood pressure, which increase your risk of falling.  Talk with your health care provider about other ways that you can decrease your risk of falls. This may include working with a physical therapist or  to improve your strength, balance, and endurance.     This information is not intended to replace advice given to you by your health care provider. Make sure you discuss any questions you have with your health care provider.     Document Released: 12/08/2003 Document Revised: 04/10/2017 Document Reviewed: 01/22/2016  [employer] Interactive Patient Education ©2017 [email or web address]      Medicare Wellness  Personal Prevention Plan of Service     Date of Office Visit:  06/01/2017  Encounter Provider:  Domenico Hui MD  Place of Service:  [facility]  MEDICINE  Patient Name: Cordell Carreon  :  1945    As part of the Medicare Wellness portion of your visit today, we are providing you with this personalized preventive plan of services (PPPS). This plan is based upon recommendations of the [place] Preventive Services Task Force (USPSTF) and the Advisory Committee on Immunization Practices (ACIP).    This lists the preventive care services that should be considered, and provides dates of when you are due. Items listed as completed are up-to-date and do not require any further intervention.    Health Maintenance   Topic Date Due   • HEPATITIS C SCREENING  2016   • DIABETIC EYE EXAM  2017   • INFLUENZA VACCINE  2017   • DIABETIC FOOT EXAM  2018   • LIPID PANEL  2018   • HEMOGLOBIN A1C  2018   • URINE MICROALBUMIN  2018   • TDAP/TD VACCINES (2 - Td) 2026   • COLONOSCOPY  2027   • ZOSTER VACCINE  Addressed   • PNEUMOCOCCAL VACCINES (65+ LOW/MEDIUM RISK)  Excluded       No orders of the defined types were placed in this encounter.      Return in about 1 year (around 2018).

## 2017-06-01 NOTE — PATIENT INSTRUCTIONS
Glimepiride tablets  What is this medicine?  GLIMEPIRIDE (GLYE me vladimir ride) helps to treat type 2 diabetes. Treatment is combined with diet and exercise. This medicine helps your body use insulin better.  This medicine may be used for other purposes; ask your health care provider or pharmacist if you have questions.  COMMON BRAND NAME(S): Amaryl  What should I tell my health care provider before I take this medicine?  They need to know if you have any of these conditions:  -diabetic ketoacidosis  -glucose-6-phosphate dehydrogenase deficiency  -heart disease  -kidney disease  -liver disease  -severe infection or injury  -thyroid disease  -an unusual or allergic reaction to glimepiride, sulfa drugs, other medicines, foods, dyes, or preservatives  -pregnancy or recent attempts to get pregnant  -breast-feeding  How should I use this medicine?  Take this medicine by mouth. Swallow with a drink of water. Follow the directions on the prescription label. Take your dose at the same time each day, with breakfast or your first large meal. Do not take more often than directed.  Talk to your pediatrician regarding the use of this medicine in children. Special care may be needed.  Elderly patients over 65 years old can have a stronger reaction and need a smaller dose.  Overdosage: If you think you have taken too much of this medicine contact a poison control center or emergency room at once.  NOTE: This medicine is only for you. Do not share this medicine with others.  What if I miss a dose?  If you miss a dose, take it as soon as you can. If it is almost time for your next dose, take only that dose. Do not take double or extra doses.  What may interact with this medicine?  -bosentan  -chloramphenicol  -cisapride  -clarithromycin  -medicines for fungal or yeast infections  -metoclopramide  -probenecid  -warfarin  Many medications may cause an increase or decrease in blood sugar, these include:  -alcohol containing  beverages  -aspirin and aspirin-like drugs  -chloramphenicol  -chromium  -female hormones, like estrogens or progestins and birth control pills  -fluoxetine  -heart medicines like disopyramide  -isoniazid  -male hormones or anabolic steroids  -medicines called MAO Inhibitors like Nardil, Parnate, Marplan, Eldepryl  -medicines for allergies, asthma, cold, or cough  -medicines for mental problems  -medicines for weight loss  -niacin  -NSAIDs, medicines for pain and inflammation, like ibuprofen or naproxen  -pentamidine  -phenytoin  -probenecid  -quinolone antibiotics like ciprofloxacin, levofloxacin, ofloxacin  -some herbal dietary supplements  -steroid medicines like prednisone or cortisone  -thyroid medicine  -water pills or diuretics  This list may not describe all possible interactions. Give your health care provider a list of all the medicines, herbs, non-prescription drugs, or dietary supplements you use. Also tell them if you smoke, drink alcohol, or use illegal drugs. Some items may interact with your medicine.  What should I watch for while using this medicine?  Visit your doctor or health care professional for regular checks on your progress.  A test called the HbA1C (A1C) will be monitored. This is a simple blood test. It measures your blood sugar control over the last 2 to 3 months. You will receive this test every 3 to 6 months.  Learn how to check your blood sugar. Learn the symptoms of low and high blood sugar and how to manage them.  Always carry a quick-source of sugar with you in case you have symptoms of low blood sugar. Examples include hard sugar candy or glucose tablets. Make sure others know that you can choke if you eat or drink when you develop serious symptoms of low blood sugar, such as seizures or unconsciousness. They must get medical help at once.  Tell your doctor or health care professional if you have high blood sugar. You might need to change the dose of your medicine. If you are sick  or exercising more than usual, you might need to change the dose of your medicine.  Do not skip meals. Ask your doctor or health care professional if you should avoid alcohol. Many nonprescription cough and cold products contain sugar or alcohol. These can affect blood sugar.  This medicine can make you more sensitive to the sun. Keep out of the sun. If you cannot avoid being in the sun, wear protective clothing and use sunscreen. Do not use sun lamps or tanning beds/booths.  Wear a medical ID bracelet or chain, and carry a card that describes your disease and details of your medicine and dosage times.  What side effects may I notice from receiving this medicine?  Side effects that you should report to your doctor or health care professional as soon as possible:  -allergic reactions like skin rash, itching or hives, swelling of the face, lips, or tongue  -breathing problems  -dark urine  -fever, chills, sore throat  -signs and symptoms of low blood sugar such as feeling anxious, confusion, dizziness, increased hunger, unusually weak or tired, sweating, shakiness, cold, irritable, headache, blurred vision, fast heartbeat, loss of consciousness  -unusual bleeding or bruising  -yellowing of the eyes or skin  Side effects that usually do not require medical attention (report to your doctor or health care professional if they continue or are bothersome):  -diarrhea  -dizziness  -headache  -heartburn  -nausea  -stomach gas  This list may not describe all possible side effects. Call your doctor for medical advice about side effects. You may report side effects to FDA at 7-298-FDA-7424.  Where should I keep my medicine?  Keep out of the reach of children.  Store at room temperature below 30 degrees C (86 degrees F). Throw away any unused medicine after the expiration date.  NOTE: This sheet is a summary. It may not cover all possible information. If you have questions about this medicine, talk to your doctor, pharmacist, or  health care provider.     © 2017, [employer]. (2014-04-02 14:29:47)

## 2017-06-01 NOTE — TELEPHONE ENCOUNTER
Pt called and would like a refill of his Lantus, test strips and Metformin.  Was seen in the office today.

## 2017-06-01 NOTE — PROGRESS NOTES
QUICK REFERENCE INFORMATION:  The ABCs of the Annual Wellness Visit    Subsequent Medicare Wellness Visit    HEALTH RISK ASSESSMENT    1945    Recent Hospitalizations:  No hospitalization(s) within the last year..    Current Medical Providers:  Patient Care Team:  Rickie Rose MD as PCP - General (Gastroenterology)  Domenico Hui MD as PCP - Family Medicine  Domenico Hui MD as PCP - Claims Attributed  Sushil Ramirez dentist.       Smoking Status:  History   Smoking Status   • Never Smoker   Smokeless Tobacco   • Never Used     Alcohol Consumption:  History   Alcohol Use   • Yes     Comment: occ     Depression Screen:   PHQ-9 Depression Screening 6/1/2017   Little interest or pleasure in doing things 0   Feeling down, depressed, or hopeless 0   PHQ-9 Total Score 0       Health Habits and Functional and Cognitive Screening:  Functional & Cognitive Status 6/1/2017   Do you have difficulty preparing food and eating? No   Do you have difficulty bathing yourself? No   Do you have difficulty getting dressed? No   Do you have difficulty using the toilet? No   Do you have difficulty moving around from place to place? No   In the past year have you fallen or experienced a near fall? No   Do you need help using the phone?  No   Are you deaf or do you have serious difficulty hearing?  No   Do you need help with transportation? No   Do you need help shopping? No   Do you need help preparing meals?  No   Do you need help with housework?  No   Do you need help with laundry? No   Do you need help taking your medications? No   Do you need help managing money? No          Does the patient have evidence of cognitive impairment? No    Aspirin use counseling: Taking ASA appropriately as indicated      Recent Lab Results:  CMP:  Lab Results   Component Value Date     (H) 03/01/2017    BUN 13 03/01/2017    CREATININE 1.17 03/01/2017    EGFRIFNONA 62 03/01/2017    EGFRIFAFRI 75 03/01/2017    BCR 11.1  03/01/2017     03/01/2017    K 4.7 03/01/2017    CO2 25.0 03/01/2017    CALCIUM 9.6 03/01/2017    PROTENTOTREF 7.3 03/01/2017    ALBUMIN 4.20 03/01/2017    LABGLOBREF 3.1 03/01/2017    LABIL2 1.4 03/01/2017    BILITOT 1.0 03/01/2017    ALKPHOS 70 03/01/2017    AST 36 03/01/2017    ALT 51 03/01/2017     Lipid Panel:  Lab Results   Component Value Date    CHLPL 216 (H) 03/01/2017    TRIG 192 (H) 03/01/2017    HDL 35 (L) 03/01/2017    VLDL 38.4 03/01/2017     (H) 03/01/2017     HbA1c:  Lab Results   Component Value Date    HGBA1C 8.0 03/01/2017       Visual Acuity:  No exam data present    Age-appropriate Screening Schedule:  Refer to the list below for future screening recommendations based on patient's age, sex and/or medical conditions. Orders for these recommended tests are listed in the plan section. The patient has been provided with a written plan.    Health Maintenance   Topic Date Due   • PNEUMOCOCCAL VACCINES (65+ LOW/MEDIUM RISK) (2 of 2 - PCV13) 11/05/2016   • DIABETIC EYE EXAM  05/31/2017   • INFLUENZA VACCINE  08/01/2017   • DIABETIC FOOT EXAM  03/01/2018   • LIPID PANEL  03/01/2018   • HEMOGLOBIN A1C  03/01/2018   • URINE MICROALBUMIN  03/01/2018   • COLONOSCOPY  01/01/2020   • TDAP/TD VACCINES (2 - Td) 12/01/2026   • ZOSTER VACCINE  Addressed        Subjective   History of Present Illness    Cordell Carreon is a 71 y.o. male who presents for an Subsequent Wellness Visit.    The following portions of the patient's history were reviewed and updated as appropriate: allergies, current medications, past family history, past medical history, past social history, past surgical history and problem list.    Outpatient Medications Prior to Visit   Medication Sig Dispense Refill   • amLODIPine-benazepril (LOTREL) 10-20 MG per capsule Take 1 capsule by mouth Daily. 90 capsule 0   • aspirin 81 MG EC tablet Take 81 mg by mouth Daily.     • atorvastatin (LIPITOR) 80 MG tablet Take 1 tablet by mouth Every  "Night. 90 tablet 0   • metFORMIN (GLUCOPHAGE) 1000 MG tablet TAKE ONE TABLET BY MOUTH TWICE DAILY 180 tablet 0   • metoprolol tartrate (LOPRESSOR) 50 MG tablet Take 1 tablet by mouth 2 (Two) Times a Day. 180 tablet 0   • naproxen sodium (ALEVE) 220 MG tablet Take 220 mg by mouth Daily.     • SITagliptin (JANUVIA) 100 MG tablet Take 1 tablet by mouth Daily. 30 tablet 5     No facility-administered medications prior to visit.        Patient Active Problem List   Diagnosis   • Need for hepatitis C screening test   • Type 2 diabetes mellitus with hemoglobin A1c goal of less than 7.0%   • H/O heart bypass surgery   • Benign essential HTN   • Hyperlipidemia       Advance Care Planning:  power of  for healthcare on file, has an advance directive - a copy has been provided and is in file    Identification of Risk Factors:  Risk factors include: weight , unhealthy diet and cardiovascular risk.    Review of Systems    Compared to one year ago, the patient feels his physical health is the same.  Compared to one year ago, the patient feels his mental health is the same.    Objective     Physical Exam    Vitals:    06/01/17 0946   BP: 112/82   Pulse: 56   Resp: 18   Temp: 97.7 °F (36.5 °C)   SpO2: 98%   Weight: 226 lb (103 kg)   Height: 68\" (172.7 cm)   PainSc: 0-No pain       Body mass index is 34.36 kg/(m^2).  Discussed the patient's BMI with him. The BMI is above average; BMI management plan is completed.    Assessment/Plan   Patient Self-Management and Personalized Health Advice  The patient has been provided with information about: diet, exercise, weight management, prevention of cardiac or vascular disease and the relationship between weight and GERD and preventive services including:   · Prostate cancer screening discussed.    Visit Diagnoses:    ICD-10-CM ICD-9-CM   1. Routine general medical examination at health care facility Z00.00 V70.0       Outpatient Encounter Prescriptions as of 6/1/2017   Medication Sig " Dispense Refill   • amLODIPine-benazepril (LOTREL) 10-20 MG per capsule Take 1 capsule by mouth Daily. 90 capsule 0   • aspirin 81 MG EC tablet Take 81 mg by mouth Daily.     • atorvastatin (LIPITOR) 80 MG tablet Take 1 tablet by mouth Every Night. 90 tablet 0   • metFORMIN (GLUCOPHAGE) 1000 MG tablet TAKE ONE TABLET BY MOUTH TWICE DAILY 180 tablet 0   • metoprolol tartrate (LOPRESSOR) 50 MG tablet Take 1 tablet by mouth 2 (Two) Times a Day. 180 tablet 0   • naproxen sodium (ALEVE) 220 MG tablet Take 220 mg by mouth Daily.     • SITagliptin (JANUVIA) 100 MG tablet Take 1 tablet by mouth Daily. 30 tablet 5     No facility-administered encounter medications on file as of 6/1/2017.        Reviewed use of high risk medication in the elderly: yes  Reviewed for potential of harmful drug interactions in the elderly: yes    Follow Up:  Return in about 1 year (around 6/1/2018).   Patient has no ambulatory issues  An After Visit Summary and PPPS with all of these plans were given to the patient.

## 2017-06-01 NOTE — PROGRESS NOTES
Chief Complaint   Patient presents with   • Follow-up     3month f/u of diabetes, hyperlipidemia, HTN.  Pt states that he didn't notice any difference in the Januvia.  He states that it is too expensive.        History:  Cordell Carreon is a 71 y.o. male presents who today for evaluation of the above problems.  PCP currently listed as Domenico Hui MD.   The patient has been checking sugars.  Running 170's.  He never got the januvia as it was 288 dollars.  He is on metformin only, has never gotten the januvia.  He has no allergy to sulfa.  We talked about cheap options to include sulfonylurea.  We talked about amaryl and we will start this today.  Discontinue januvia.  BP is stable.  Microalbumin done 03/2017.  Eye provider visit this coming week.  No changes in foot health.  Pneumonia vaccine up to date.  Hyperlipidemia. Cholesterol remains markeldy elevated despite high potentcy statin, lipitor.  ?benefit to zetia.  No labs today as labs done 03/2017.  He will continue current meds, stop januvia.      Cordell Carreon  has a past medical history of Diabetes mellitus; Hyperlipidemia; and Hypertension.    No Known Allergies  Past Medical History:   Diagnosis Date   • Diabetes mellitus    • Hyperlipidemia    • Hypertension      Past Surgical History:   Procedure Laterality Date   • CORONARY ARTERY BYPASS GRAFT       Family History   Problem Relation Age of Onset   • Dementia Mother    • Heart attack Father    • No Known Problems Sister    • No Known Problems Daughter    • No Known Problems Son    • No Known Problems Maternal Grandmother    • Heart attack Maternal Grandfather    • No Known Problems Paternal Grandmother    • No Known Problems Paternal Grandfather        Current Outpatient Prescriptions on File Prior to Visit   Medication Sig Dispense Refill   • amLODIPine-benazepril (LOTREL) 10-20 MG per capsule Take 1 capsule by mouth Daily. 90 capsule 0   • aspirin 81 MG EC tablet Take 81 mg by mouth Daily.      • atorvastatin (LIPITOR) 80 MG tablet Take 1 tablet by mouth Every Night. 90 tablet 0   • metFORMIN (GLUCOPHAGE) 1000 MG tablet TAKE ONE TABLET BY MOUTH TWICE DAILY 180 tablet 0   • metoprolol tartrate (LOPRESSOR) 50 MG tablet Take 1 tablet by mouth 2 (Two) Times a Day. 180 tablet 0   • naproxen sodium (ALEVE) 220 MG tablet Take 220 mg by mouth Daily.     • SITagliptin (JANUVIA) 100 MG tablet Take 1 tablet by mouth Daily. 30 tablet 5     No current facility-administered medications on file prior to visit.        Family history, surgical history, past medical history, Allergies and meds reviewed with patient today and updated in [facility].     ROS:  Review of Systems   Constitutional: Negative for activity change, appetite change, chills, diaphoresis, fatigue and fever.   HENT: Negative for congestion, ear discharge, ear pain, facial swelling, hearing loss, rhinorrhea, sinus pressure, sneezing, sore throat and tinnitus.    Eyes: Negative for pain, discharge, redness and visual disturbance.   Respiratory: Negative for apnea, cough, choking, chest tightness, shortness of breath and wheezing.    Cardiovascular: Negative for chest pain, palpitations and leg swelling.        HTN stable   Gastrointestinal: Negative for abdominal pain, constipation, diarrhea, nausea and vomiting.   Endocrine:        DM 2, hyperlipid, obesity.   Genitourinary: Negative for difficulty urinating, flank pain, frequency, penile pain and urgency.   Musculoskeletal: Negative for arthralgias, back pain, gait problem, joint swelling, myalgias and neck pain.   Skin: Negative for color change, pallor and rash.   Allergic/Immunologic: Negative for environmental allergies and food allergies.   Neurological: Negative for dizziness, seizures, weakness, numbness and headaches.   Hematological: Negative for adenopathy. Does not bruise/bleed easily.   Psychiatric/Behavioral: Negative for agitation, behavioral problems, confusion, hallucinations,  "self-injury, sleep disturbance and suicidal ideas.       OBJECTIVE:  Vitals:    06/01/17 0910   BP: 112/82   BP Location: Left arm   Patient Position: Sitting   Cuff Size: Adult   Pulse: 56   Resp: 18   Temp: 97.7 °F (36.5 °C)   TempSrc: Oral   SpO2: 98%   Weight: 226 lb 12.8 oz (103 kg)   Height: 68\" (172.7 cm)     Physical Exam   Constitutional: He is oriented to person, place, and time. He appears well-developed and well-nourished.   HENT:   Head: Normocephalic and atraumatic.   Right Ear: External ear normal.   Left Ear: External ear normal.   Nose: Nose normal.   Mouth/Throat: Oropharynx is clear and moist.   Eyes: Conjunctivae and EOM are normal. Pupils are equal, round, and reactive to light.   Neck: Normal range of motion. Neck supple. No thyromegaly present.   Cardiovascular: Normal rate, regular rhythm, normal heart sounds and intact distal pulses.    No murmur heard.  Pulmonary/Chest: Effort normal and breath sounds normal. No respiratory distress. He has no wheezes. He exhibits no tenderness.   Abdominal: Soft. Bowel sounds are normal. There is no tenderness. There is no rebound and no guarding.   Musculoskeletal: Normal range of motion. He exhibits no tenderness.    Cordell JAMIL had a diabetic foot exam performed (reduced hair on feet and shin.  ) today.   During the foot exam he had a monofilament test performed (Normal bilateral feet).    Vascular Status -  His exam exhibits right foot vasculature normal. His exam exhibits no right foot edema. His exam exhibits left foot vasculature normal. His exam exhibits no left foot edema.   Skin Integrity  -  His right foot skin is intact.  He has right foot onychomycosis (Digit 1 and 3) and right foot warmth.  He hasno right foot ulcer, non-callous right foot,  no ingrown toenail on right foot, right heel is not dry and cracked and no right foot blister.    Cordell JAMIL 's left foot skin is intact. He has left foot onychomycosis (digit 1,2,3,5) and left foot warmth. He " has no left foot ulcer, non-callous left foot, no left ingrown toenail, no left heel dry and cracked and no left foot blister..   Foot Structure and Biomechanics -  His right foot exhibits hallux valgus. His right foot has no claw toes and no hammer toes present.  His left foot exhibits hallux valgus. His left foot has no claw toes and no hammer toes.      Lymphadenopathy:     He has no cervical adenopathy.   Neurological: He is alert and oriented to person, place, and time. No cranial nerve deficit. Coordination normal.   Skin: Skin is warm and dry. No rash noted.   Psychiatric: He has a normal mood and affect. His behavior is normal. Judgment and thought content normal.   Nursing note and vitals reviewed.    Lab Results   Component Value Date    HGBA1C 7.5 06/01/2017       Assessment/Plan:  Type 2 diabetes mellitus with hemoglobin A1c goal of less than 7.0%: Diabetes Type 2:  stable, improved, no significant medication side effects noted, borderline controlled and needs to follow diet more regularly. Insulin dependent: no. Nephropathy, Retinopathy, Neuropathy status discussed.  Reviewed goals of Diabetes today.  The Goal is to have an Hgb A1C <7.0 for most patients.  Good BP control is encouraged with Goal BP based on JNC 8 guidelines 2014 of SBP <140 and DBP <90.  At goal: yes.  Discussed role of Ace-I and ARB with DM.  DM imparts risk equivalence for CAD based on ATP III.  Current guidelines from ACC/AHA support moderate intensity statin with goal of 30-50% reduction in LDL unless 10 yr risk ASCVD >7.5 then high intensity should be used. Close monitoring of Lipid levels encouraged. Recommend once yearly eye evaluation by optometry or ophthalmology.  Good foot health discussed and foot exam recommended with each visit. Recommended to monitor toe health, wear good shoes, cut nails straight across and tend calluses as listed.  Take medications as encouraged.  Monitor blood sugars as encouraged and bring log to  future meetings. Weight needs to be monitored. Monitor portions and caloric intake.    · Pneumovax frequency complete/up to date.   · Recent data suggest there may be a link between metformin and B12.  D/W patient to consider evaluation for b12 deficiency.  · Recommend yearly microalbumin  · Yearly eye evaluation, please sign release to get eye evaluation data.   · Discussed importance of Ace-I and ARB  · Monitor portions, goal BMI discussed.   · Work on regular exercise and diet to keep BMI in appropriate range.   · Lab review: Any needed orders written for new lab studies as appropriate; see orders.   ·  Diabetic issues reviewed with male: referral to Diabetic Education department discussed, low cholesterol diet, weight control and daily exercise discussed, home glucose monitoring emphasized, all medications, side effects and compliance discussed carefully, foot care discussed and offered referral to Podiatry,  annual eye examinations discussed, glycohemoglobin and other lab monitoring discussed and long term diabetic complications discussed.  · Role of Bariatric surgery d/w patient today.    · MEds as listed Reviewed r/B/A and SE of listed agents.   Orders:    -     POC Glycosylated Hemoglobin (Hb A1C)  -     glimepiride (AMARYL) 2 MG tablet; Take 1 tablet by mouth Every Morning Before Breakfast.    Need for hepatitis C screening test    Mixed hyperlipidemia: Chronic established problem. Current Status:stable.  We reviewed current therapy for this problem.  Discussed R/B/A to current therapy. Discussed need to follow recommendations and to use medications regularly as prescribed, to avoid missing doses as able.  Medications reviewed with patient today. We discussed cessation/continuation of medications for current problem.  If refills needed Rx refills will be provided.  No side effects from medications reported.  Risks/benefits to current therapy discussed.    Benign essential HTN: Chronic established problem.  Current Status:stable.  We reviewed current therapy for this problem.  Discussed R/B/A to current therapy. Discussed need to follow recommendations and to use medications regularly as prescribed, to avoid missing doses as able.  Medications reviewed with patient today. We discussed cessation/continuation of medications for current problem.  If refills needed Rx refills will be provided.  No side effects from medications reported.  Risks/benefits to current therapy discussed. At goal, continue current medications.        Risks/benefits of current and new medications discussed with the patient and or family today.  The patient/family are aware and accept that if there any side effects they should call or return to clinic as soon as possible.  Appropriate F/U discussed for topics addressed today. All questions were answered to the satisfactory state of patient/family.  Should symptoms fail to improve or worsen they agree to call or return to clinic or to go to the ER. Education handouts were offered on any new Rx if requested.  Discussed the importance of following up with any needed screening tests/labs/specialist appointments and any requested follow-up recommended by me today.  Importance of maintaining follow-up discussed and patient accepts that missed appointments can delay diagnosis and potentially lead to worsening of conditions.    An After Visit Summary was printed and given to the patient at discharge.  Follow-up: Return in about 4 months (around 10/1/2017).         Domenico Hui M.D. 6/1/2017

## 2017-06-02 NOTE — TELEPHONE ENCOUNTER
Pt was contacted and he said it was Metformin, test strips and lancets he needed refilled.  Pt states that the strips are  ultra blue test strips and the lancets are One touch Delica extra fine

## 2017-06-02 NOTE — TELEPHONE ENCOUNTER
Patient is not on lantus.  Can we please find out what type of meter he has. Additionally what prescription is needed if it is not lantus. Domenico Hui MD 6/2/2017 10:46 AM

## 2017-07-27 RX ORDER — METOPROLOL TARTRATE 50 MG/1
TABLET, FILM COATED ORAL
Qty: 180 TABLET | Refills: 0 | Status: SHIPPED | OUTPATIENT
Start: 2017-07-27 | End: 2017-10-24 | Stop reason: SDUPTHER

## 2017-07-27 RX ORDER — ATORVASTATIN CALCIUM 80 MG/1
TABLET, FILM COATED ORAL
Qty: 90 TABLET | Refills: 0 | Status: SHIPPED | OUTPATIENT
Start: 2017-07-27 | End: 2017-10-31 | Stop reason: SDUPTHER

## 2017-07-27 RX ORDER — AMLODIPINE BESYLATE AND BENAZEPRIL HYDROCHLORIDE 10; 20 MG/1; MG/1
CAPSULE ORAL
Qty: 90 CAPSULE | Refills: 0 | Status: SHIPPED | OUTPATIENT
Start: 2017-07-27 | End: 2017-10-24 | Stop reason: SDUPTHER

## 2017-10-02 ENCOUNTER — OFFICE VISIT (OUTPATIENT)
Dept: FAMILY MEDICINE CLINIC | Facility: CLINIC | Age: 72
End: 2017-10-02

## 2017-10-02 VITALS
TEMPERATURE: 97.7 F | RESPIRATION RATE: 18 BRPM | DIASTOLIC BLOOD PRESSURE: 74 MMHG | OXYGEN SATURATION: 98 % | BODY MASS INDEX: 35.74 KG/M2 | SYSTOLIC BLOOD PRESSURE: 118 MMHG | WEIGHT: 235.8 LBS | HEIGHT: 68 IN | HEART RATE: 50 BPM

## 2017-10-02 DIAGNOSIS — Z23 FLU VACCINE NEED: ICD-10-CM

## 2017-10-02 DIAGNOSIS — I10 BENIGN ESSENTIAL HTN: ICD-10-CM

## 2017-10-02 DIAGNOSIS — Z95.1 H/O HEART BYPASS SURGERY: ICD-10-CM

## 2017-10-02 DIAGNOSIS — E78.2 MIXED HYPERLIPIDEMIA: ICD-10-CM

## 2017-10-02 DIAGNOSIS — E11.9 TYPE 2 DIABETES MELLITUS WITH HEMOGLOBIN A1C GOAL OF LESS THAN 7.0% (HCC): Primary | ICD-10-CM

## 2017-10-02 DIAGNOSIS — R63.8 DIETARY INDISCRETION: ICD-10-CM

## 2017-10-02 PROBLEM — Z86.010 HISTORY OF COLON POLYPS: Status: ACTIVE | Noted: 2017-04-18

## 2017-10-02 PROBLEM — Z86.0100 HISTORY OF COLON POLYPS: Status: ACTIVE | Noted: 2017-04-18

## 2017-10-02 LAB — HBA1C MFR BLD: 7 %

## 2017-10-02 PROCEDURE — 83036 HEMOGLOBIN GLYCOSYLATED A1C: CPT | Performed by: FAMILY MEDICINE

## 2017-10-02 PROCEDURE — 90662 IIV NO PRSV INCREASED AG IM: CPT | Performed by: FAMILY MEDICINE

## 2017-10-02 PROCEDURE — 99214 OFFICE O/P EST MOD 30 MIN: CPT | Performed by: FAMILY MEDICINE

## 2017-10-02 PROCEDURE — 90471 IMMUNIZATION ADMIN: CPT | Performed by: FAMILY MEDICINE

## 2017-10-02 NOTE — PROGRESS NOTES
Chief Complaint   Patient presents with   • Consult     Patient is here for a 4 month follow up.  I have completed his HbA1C 7.0.         History:  Cordell Carreon is a 72 y.o. male presents who today for evaluation of the above problems.  PCP currently listed as Domenico Hui MD.   DM:  He has been checking sugars.  They have been running between 140-180.  A1C today is 7.0.  He had cataract surgery recently.  This was done by Dr. Ingram.  He has had 2 months of less physical activity.  Weight is up from 226 to 235.  A1c is continuing to decline.  8.0 3/1/17, 7.5 6/1/17 and now at 7.0.  We will continue to work on diet, work on portion control, increase activity.  He has joined [employer].  Amaryl doing okay with 1 tablet daily in the am.  Using metformin BID. BP is stable.  He is on lotrel, lopressor.  He is on lipitor, no SE.  Last lipid panel total 216, trigly 192, HDL low at 35 and  03/2017.  Repeat today.   Influenza requested today.  Pneumonia vaccine up to date, has had both.  For now he is doing very well.  We will continue to work on diet, portion control, lifestyle changes and see back in 6 months.  At that time we will re-eval a1c, weight, HTN.      Cordell Carreon  has a past medical history of Diabetes mellitus; Hyperlipidemia; and Hypertension.    No Known Allergies  Past Medical History:   Diagnosis Date   • Diabetes mellitus    • Hyperlipidemia    • Hypertension      Past Surgical History:   Procedure Laterality Date   • CORONARY ARTERY BYPASS GRAFT       Family History   Problem Relation Age of Onset   • Dementia Mother    • Heart attack Father    • No Known Problems Sister    • No Known Problems Daughter    • No Known Problems Son    • No Known Problems Maternal Grandmother    • Heart attack Maternal Grandfather    • No Known Problems Paternal Grandmother    • No Known Problems Paternal Grandfather        Current Outpatient Prescriptions on File Prior to Visit   Medication Sig  Dispense Refill   • amLODIPine-benazepril (LOTREL) 10-20 MG per capsule TAKE ONE CAPSULE BY MOUTH ONCE DAILY 90 capsule 0   • aspirin 81 MG EC tablet Take 81 mg by mouth Daily.     • atorvastatin (LIPITOR) 80 MG tablet TAKE ONE TABLET BY MOUTH ONCE NIGHTLY 90 tablet 0   • glimepiride (AMARYL) 2 MG tablet Take 1 tablet by mouth Every Morning Before Breakfast. 30 tablet 5   • glucose blood (ONE TOUCH ULTRA TEST) test strip Test once daily. 100 each 12   • metFORMIN (GLUCOPHAGE) 1000 MG tablet Take 1 tablet by mouth 2 (Two) Times a Day With Meals. 180 tablet 2   • metoprolol tartrate (LOPRESSOR) 50 MG tablet TAKE ONE TABLET BY MOUTH TWICE DAILY 180 tablet 0   • naproxen sodium (ALEVE) 220 MG tablet Take 220 mg by mouth Daily.     • ONETOUCH DELICA LANCETS FINE misc Test once daily. 100 each 12     No current facility-administered medications on file prior to visit.        Family history, surgical history, past medical history, Allergies and meds reviewed with patient today and updated in [facility] EMR.     ROS:  Review of Systems   Constitutional: Negative for activity change, appetite change, chills, diaphoresis, fatigue and fever.        Obesity weight gain   HENT: Negative for congestion, ear discharge, ear pain, facial swelling, hearing loss, rhinorrhea, sinus pressure, sneezing, sore throat and tinnitus.    Eyes: Negative for pain, discharge, redness and visual disturbance.   Respiratory: Negative for apnea, cough, choking, chest tightness, shortness of breath and wheezing.    Cardiovascular: Negative for chest pain, palpitations and leg swelling.   Gastrointestinal: Negative for abdominal pain, constipation, diarrhea, nausea and vomiting.   Genitourinary: Negative for difficulty urinating, flank pain, frequency, penile pain and urgency.   Musculoskeletal: Negative for arthralgias, back pain, gait problem, joint swelling, myalgias and neck pain.   Skin: Negative for color change, pallor and rash.  "  Allergic/Immunologic: Negative for environmental allergies and food allergies.   Neurological: Negative for dizziness, seizures, weakness, numbness and headaches.   Hematological: Negative for adenopathy. Does not bruise/bleed easily.   Psychiatric/Behavioral: Negative for agitation, behavioral problems, confusion, hallucinations, self-injury, sleep disturbance and suicidal ideas.       OBJECTIVE:  Vitals:    10/02/17 0943   BP: 118/74   Pulse: 50   Resp: 18   Temp: 97.7 °F (36.5 °C)   SpO2: 98%   Weight: 235 lb 12.8 oz (107 kg)   Height: 68\" (172.7 cm)     Physical Exam   Constitutional: He is oriented to person, place, and time. He appears well-developed and well-nourished.   HENT:   Head: Normocephalic and atraumatic.   Right Ear: External ear normal.   Left Ear: External ear normal.   Nose: Nose normal.   Mouth/Throat: Oropharynx is clear and moist.   Eyes: Conjunctivae and EOM are normal. Pupils are equal, round, and reactive to light.   Neck: Normal range of motion. Neck supple. No thyromegaly present.   Cardiovascular: Normal rate, regular rhythm, normal heart sounds and intact distal pulses.    No murmur heard.  Pulmonary/Chest: Effort normal and breath sounds normal. No respiratory distress. He has no wheezes. He exhibits no tenderness.   Abdominal: Soft. Bowel sounds are normal. There is no tenderness. There is no rebound and no guarding.   Musculoskeletal: Normal range of motion. He exhibits no tenderness.    Cordell JAMIL had a diabetic foot exam performed (reduced hair on feet and shin.  ) today.   During the foot exam he had a monofilament test performed (Normal bilateral feet).    Vascular Status -  His exam exhibits right foot vasculature normal. His exam exhibits no right foot edema. His exam exhibits left foot vasculature normal. His exam exhibits no left foot edema.   Skin Integrity  -  His right foot skin is intact.  He has right foot onychomycosis (Digit 1 and 3) and right foot warmth.  He hasno " right foot ulcer, non-callous right foot,  no ingrown toenail on right foot, right heel is not dry and cracked and no right foot blister.    Cordell JAMIL 's left foot skin is intact. He has left foot onychomycosis (digit 1,2,3,5) and left foot warmth. He has no left foot ulcer, non-callous left foot, no left ingrown toenail, no left heel dry and cracked and no left foot blister..   Foot Structure and Biomechanics -  His right foot exhibits hallux valgus. His right foot has no claw toes and no hammer toes present.  His left foot exhibits hallux valgus. His left foot has no claw toes and no hammer toes.      Lymphadenopathy:     He has no cervical adenopathy.     He has no axillary adenopathy.        Right: No inguinal adenopathy present.        Left: No inguinal adenopathy present.   Neurological: He is alert and oriented to person, place, and time. No cranial nerve deficit. Coordination normal.   Skin: Skin is warm and dry. No rash noted.   Psychiatric: He has a normal mood and affect. His behavior is normal. Judgment and thought content normal.   Nursing note and vitals reviewed.      Assessment/Plan:  Type 2 diabetes mellitus with hemoglobin A1c goal of less than 7.0%: Diabetes Type 2:  improved, reasonably well controlled, borderline controlled and needs to follow diet more regularly. Insulin dependent: no. Nephropathy, Retinopathy, Neuropathy status discussed.  Reviewed goals of Diabetes today.  The Goal is to have an Hgb A1C <7.0 for most patients.  Good BP control is encouraged with Goal BP based on JNC 8 guidelines 2014 of SBP <140 and DBP <90.  At goal: yes.  Discussed role of Ace-I and ARB with DM.  DM imparts risk equivalence for CAD based on ATP III.  Current guidelines from ACC/AHA support moderate intensity statin with goal of 30-50% reduction in LDL unless 10 yr risk ASCVD >7.5 then high intensity should be used. Close monitoring of Lipid levels encouraged. Recommend once yearly eye evaluation by optometry  or ophthalmology.  Good foot health discussed and foot exam recommended with each visit. Recommended to monitor toe health, wear good shoes, cut nails straight across and tend calluses as listed.  Take medications as encouraged.  Monitor blood sugars as encouraged and bring log to future meetings. Weight needs to be monitored. Monitor portions and caloric intake.  A1c is improving.  Continue to work on lifestyle changes.  Discussed jardiance, discussed injectable bydureon/trulicity. Not interested at present.   · Pneumovax completed series.  · Recent data suggest there may be a link between metformin and B12.  D/W patient to consider evaluation for b12 deficiency.  · Recommend yearly microalbumin  · Yearly eye evaluation, please sign release to get eye evaluation data.   · Discussed importance of Ace-I and ARB  · Monitor portions, goal BMI discussed.   · Work on regular exercise and diet to keep BMI in appropriate range.   · Lab review: Any needed orders written for new lab studies as appropriate; see orders.   ·  Diabetic issues reviewed with male: referral to Diabetic Education department discussed, low cholesterol diet, weight control and daily exercise discussed, home glucose monitoring emphasized, all medications, side effects and compliance discussed carefully, foot care discussed and offered referral to Podiatry,  annual eye examinations discussed, glycohemoglobin and other lab monitoring discussed and long term diabetic complications discussed.  · Role of Bariatric surgery d/w patient today.    · MEds as listed Reviewed r/B/A and SE of listed agents.   ORDERS   -     POC Glycosylated Hemoglobin (Hb A1C)  -     CBC w AUTO Differential  -     Comprehensive Metabolic Panel    Flu vaccine need: Discussed risks/benefits to vaccination, reviewed components of the vaccine, discussed VIS, discussed informed consent, informed consent obtained.  Patient was allowed to accept or refuse vaccine. Questions answered to  satisfactory state of patient.  We reviewed typical age appropriate and seasonally appropriate vaccinations.  Reviewed immunization history and updated state vaccination form as needed.     -     Flu Vaccine High Dose PF 65YR+    H/O heart bypass surgery    BMI 35.0-35.9,adult: Obesity: Federal guidelines recommend that people under the age of 65 should have a BMI of 18.5-24.9 and people age 65 and older should have a BMI of 23-30. Morbid obesity is defined as >100 lb overweight or BMI >40.  The patient BMI is outside the recommended range and we recommended/discussed today to utilize a diet/exercise program to get back into the appropriate range.  Today we encouraged roughly a 1 lb per week weight loss with initial goal of 5% weight loss. The initial step is to document everything that is consumed into a food diary.  Studies have shown that patients can lose up to 2x the weight by keeping track of foods.  We discussed BEE today and discussed reasonable goal to realize weight loss.    · Discussed if eating out for a meal, consider cutting food in half and placing into to-go container.  Individually portion any foods coming into the home based on package.   · Consider using smaller plates.    · Consider drinking 12 oz of water 30 minutes before meal as way to suppress appetite.   · Cut back on soft drinks/juices.  Discussed 1 can of regular soft drink has roughly 150-170 Calories per day.    · Increase exercise as able. It is recommended to try to exercise minimum 10 minutes 5 days per week.  The goal over the next 2-4 weeks is to walk 30 minutes per day 5 days per week at pace difficult to hold conversation.   · Medications that may provide some benefit to weight loss include metformin, topamax, phentermine, Qsymia, Belviq (lorcaserin), Contrave (Buproprion/naltrexone) and a few others for Binge eating.  Also discussed some of the FAD diets and recommended general portion reduction instead of special dietary  changes.  · Apps that may be of benefit include Myfitness Pal, Lose it.  · Healthier choices included fruits/grains/nuts instead of process food.  · Offered referral to dietician and bariatrics.  Declined    Mixed hyperlipidemia: Elevated 03/2017. Repeat levels and f/u with pt. On statin. On Ace-I.  On ASA.  Doing well.  High potency statin already. Diet/lifestyle modifications encouraged today.   -     Lipid Panel    Benign essential HTN: HTN: Suspect essential HTN. Good BP control is encouraged with Goal BP based on JNC 8 guidelines:  For the general population <60 yr old goal BP <140/90 and for those >60 <150/90.  For patients of all ages with Diabetes, CKD, Known CAD the goal is <140/90. Reviewed typical first line agents to include thiazide diuretic or ace-I or ARB or CCB alone or in combo. At goal yes.  If not already owned, I recommended to the patient to obtain electronic home BP machine with upper arm blood pressure cuff and to check regularly as instructed.  Keep BP log and bring to subsequent visits.    · Offered handout on HTN educational topics: HTN, Low sodium diet.   These were provided if patient requested these today.  · ADA diet encouraged.  · Monitor cholesterol regularly  · Monitor weight with goal of BMI <30.  · Consider taking Rx at night as recent study supports this may decreased chances of leading to DM.   · Consider bringing home BP cuff to office to compare readings with ours.  · Checking BP at home can help to lower BP.  Certain medications and substances can increase BP to include: NSAIDS, caffeine, decongestants, nicotine.    · Well controlled, BP goal for age is <140/90 per JNC 8 guidelines and continue current medications    ORDERS  -     CBC w AUTO Differential  -     Comprehensive Metabolic Panel    Dietary indiscretion        Risks/benefits of current and new medications discussed with the patient and or family today.  The patient/family are aware and accept that if there any side  effects they should call or return to clinic as soon as possible.  Appropriate F/U discussed for topics addressed today. All questions were answered to the satisfactory state of patient/family.  Should symptoms fail to improve or worsen they agree to call or return to clinic or to go to the ER. Education handouts were offered on any new Rx if requested.  Discussed the importance of following up with any needed screening tests/labs/specialist appointments and any requested follow-up recommended by me today.  Importance of maintaining follow-up discussed and patient accepts that missed appointments can delay diagnosis and potentially lead to worsening of conditions.    An After Visit Summary was printed and given to the patient at discharge.  Follow-up: Return in about 6 months (around 4/2/2018).         Domenico Hui M.D. 10/2/2017

## 2017-10-02 NOTE — PATIENT INSTRUCTIONS
Calorie Counting for Weight Loss  Calories are energy you get from the things you eat and drink. Your body uses this energy to keep you going throughout the day. The number of calories you eat affects your weight. When you eat more calories than your body needs, your body stores the extra calories as fat. When you eat fewer calories than your body needs, your body burns fat to get the energy it needs.  Calorie counting means keeping track of how many calories you eat and drink each day. If you make sure to eat fewer calories than your body needs, you should lose weight. In order for calorie counting to work, you will need to eat the number of calories that are right for you in a day to lose a healthy amount of weight per week. A healthy amount of weight to lose per week is usually 1-2 lb (0.5-0.9 kg). A dietitian can determine how many calories you need in a day and give you suggestions on how to reach your calorie goal.   WHAT IS MY MY PLAN?  My goal is to have __________ calories per day.   If I have this many calories per day, I should lose around __________ pounds per week.  WHAT DO I NEED TO KNOW ABOUT CALORIE COUNTING?  In order to meet your daily calorie goal, you will need to:  · Find out how many calories are in each food you would like to eat. Try to do this before you eat.  · Decide how much of the food you can eat.  · Write down what you ate and how many calories it had. Doing this is called keeping a food log.  WHERE DO I FIND CALORIE INFORMATION?  The number of calories in a food can be found on a Nutrition Facts label. Note that all the information on a label is based on a specific serving of the food. If a food does not have a Nutrition Facts label, try to look up the calories online or ask your dietitian for help.  HOW DO I DECIDE HOW MUCH TO EAT?  To decide how much of the food you can eat, you will need to consider both the number of calories in one serving and the size of one serving. This  information can be found on the Nutrition Facts label. If a food does not have a Nutrition Facts label, look up the information online or ask your dietitian for help.  Remember that calories are listed per serving. If you choose to have more than one serving of a food, you will have to multiply the calories per serving by the amount of servings you plan to eat. For example, the label on a package of bread might say that a serving size is 1 slice and that there are 90 calories in a serving. If you eat 1 slice, you will have eaten 90 calories. If you eat 2 slices, you will have eaten 180 calories.  HOW DO I KEEP A FOOD LOG?  After each meal, record the following information in your food log:  · What you ate.  · How much of it you ate.  · How many calories it had.  · Then, add up your calories.  Keep your food log near you, such as in a small notebook in your pocket. Another option is to use a mobile liz or website. Some programs will calculate calories for you and show you how many calories you have left each time you add an item to the log.  WHAT ARE SOME CALORIE COUNTING TIPS?  · Use your calories on foods and drinks that will fill you up and not leave you hungry. Some examples of this include foods like nuts and nut butters, vegetables, lean proteins, and high-fiber foods (more than 5 g fiber per serving).  · Eat nutritious foods and avoid empty calories. Empty calories are calories you get from foods or beverages that do not have many nutrients, such as candy and soda. It is better to have a nutritious high-calorie food (such as an avocado) than a food with few nutrients (such as a bag of chips).  · Know how many calories are in the foods you eat most often. This way, you do not have to look up how many calories they have each time you eat them.  · Look out for foods that may seem like low-calorie foods but are really high-calorie foods, such as baked goods, soda, and fat-free candy.  · Pay attention to calories  in drinks. Drinks such as sodas, specialty coffee drinks, alcohol, and juices have a lot of calories yet do not fill you up. Choose low-calorie drinks like water and diet drinks.  · Focus your calorie counting efforts on higher calorie items. Logging the calories in a garden salad that contains only vegetables is less important than calculating the calories in a milk shake.  · Find a way of tracking calories that works for you. Get creative. Most people who are successful find ways to keep track of how much they eat in a day, even if they do not count every calorie.  WHAT ARE SOME PORTION CONTROL TIPS?  · Know how many calories are in a serving. This will help you know how many servings of a certain food you can have.  · Use a measuring cup to measure serving sizes. This is helpful when you start out. With time, you will be able to estimate serving sizes for some foods.  · Take some time to put servings of different foods on your favorite plates, bowls, and cups so you know what a serving looks like.  · Try not to eat straight from a bag or box. Doing this can lead to overeating. Put the amount you would like to eat in a cup or on a plate to make sure you are eating the right portion.  · Use smaller plates, glasses, and bowls to prevent overeating. This is a quick and easy way to practice portion control. If your plate is smaller, less food can fit on it.  · Try not to multitask while eating, such as watching TV or using your computer. If it is time to eat, sit down at a table and enjoy your food. Doing this will help you to start recognizing when you are full. It will also make you more aware of what and how much you are eating.  HOW CAN I CALORIE COUNT WHEN EATING OUT?  · Ask for smaller portion sizes or child-sized portions.  · Consider sharing an entree and sides instead of getting your own entree.  · If you get your own entree, eat only half. Ask for a box at the beginning of your meal and put the rest of your  "entree in it so you are not tempted to eat it.  · Look for the calories on the menu. If calories are listed, choose the lower calorie options.  · Choose dishes that include vegetables, fruits, whole grains, low-fat dairy products, and lean protein. Focusing on smart food choices from each of the 5 food groups can help you stay on track at restaurants.  · Choose items that are boiled, broiled, grilled, or steamed.  · Choose water, milk, unsweetened iced tea, or other drinks without added sugars. If you want an alcoholic beverage, choose a lower calorie option. For example, a regular gaye can have up to 700 calories and a glass of wine has around 150.  · Stay away from items that are buttered, battered, fried, or served with cream sauce. Items labeled \"crispy\" are usually fried, unless stated otherwise.  · Ask for dressings, sauces, and syrups on the side. These are usually very high in calories, so do not eat much of them.  · Watch out for salads. Many people think salads are a healthy option, but this is often not the case. Many salads come with cota, fried chicken, lots of cheese, fried chips, and dressing. All of these items have a lot of calories. If you want a salad, choose a garden salad and ask for grilled meats or steak. Ask for the dressing on the side, or ask for olive oil and vinegar or lemon to use as dressing.  · Estimate how many servings of a food you are given. For example, a serving of cooked rice is ½ cup or about the size of half a tennis ball or one cupcake wrapper. Knowing serving sizes will help you be aware of how much food you are eating at restaurants. The list below tells you how big or small some common portion sizes are based on everyday objects.    1 oz--4 stacked dice.    3 oz--1 deck of cards.    1 tsp--1 dice.    1 Tbsp--½ a Ping-Pong ball.    2 Tbsp--1 Ping-Pong ball.    ½ cup--1 tennis ball or 1 cupcake wrapper.    1 cup--1 baseball.     This information is not intended to " replace advice given to you by your health care provider. Make sure you discuss any questions you have with your health care provider.     Document Released: 12/18/2006 Document Revised: 01/08/2016 Document Reviewed: 10/23/2014  [employer] Interactive Patient Education ©2017 [employer]    Exercising to Lose Weight  Exercising can help you to lose weight. In order to lose weight through exercise, you need to do vigorous-intensity exercise. You can tell that you are exercising with vigorous intensity if you are breathing very hard and fast and cannot hold a conversation while exercising.  Moderate-intensity exercise helps to maintain your current weight. You can tell that you are exercising at a moderate level if you have a higher heart rate and faster breathing, but you are still able to hold a conversation.  HOW OFTEN SHOULD I EXERCISE?  Choose an activity that you enjoy and set realistic goals. Your health care provider can help you to make an activity plan that works for you. Exercise regularly as directed by your health care provider. This may include:  · Doing resistance training twice each week, such as:    Push-ups.    Sit-ups.    Lifting weights.    Using resistance bands.  · Doing a given intensity of exercise for a given amount of time. Choose from these options:    150 minutes of moderate-intensity exercise every week.    75 minutes of vigorous-intensity exercise every week.    A mix of moderate-intensity and vigorous-intensity exercise every week.  Children, pregnant women, people who are out of shape, people who are overweight, and older adults may need to consult a health care provider for individual recommendations. If you have any sort of medical condition, be sure to consult your health care provider before starting a new exercise program.  WHAT ARE SOME ACTIVITIES THAT CAN HELP ME TO LOSE WEIGHT?   · Walking at a rate of at least 4.5 miles an hour.  · Jogging or running at a rate of 5 miles per  hour.  · Biking at a rate of at least 10 miles per hour.  · Lap swimming.  · Roller-skating or in-line skating.  · Cross-country skiing.  · Vigorous competitive sports, such as football, basketball, and soccer.  · Jumping rope.  · Aerobic dancing.  HOW CAN I BE MORE ACTIVE IN MY DAY-TO-DAY ACTIVITIES?  · Use the stairs instead of the elevator.  · Take a walk during your lunch break.  · If you drive, park your car farther away from work or school.  · If you take public transportation, get off one stop early and walk the rest of the way.  · Make all of your phone calls while standing up and walking around.  · Get up, stretch, and walk around every 30 minutes throughout the day.  WHAT GUIDELINES SHOULD I FOLLOW WHILE EXERCISING?  · Do not exercise so much that you hurt yourself, feel dizzy, or get very short of breath.  · Consult your health care provider prior to starting a new exercise program.  · Wear comfortable clothes and shoes with good support.  · Drink plenty of water while you exercise to prevent dehydration or heat stroke. Body water is lost during exercise and must be replaced.  · Work out until you breathe faster and your heart beats faster.     This information is not intended to replace advice given to you by your health care provider. Make sure you discuss any questions you have with your health care provider.     Document Released: 01/20/2012 Document Revised: 01/08/2016 Document Reviewed: 05/21/2015  [employer] Interactive Patient Education ©2017 [employer]

## 2017-10-24 RX ORDER — AMLODIPINE BESYLATE AND BENAZEPRIL HYDROCHLORIDE 10; 20 MG/1; MG/1
CAPSULE ORAL
Qty: 90 CAPSULE | Refills: 0 | Status: SHIPPED | OUTPATIENT
Start: 2017-10-24 | End: 2017-12-29 | Stop reason: SDUPTHER

## 2017-10-24 RX ORDER — METOPROLOL TARTRATE 50 MG/1
TABLET, FILM COATED ORAL
Qty: 180 TABLET | Refills: 0 | Status: SHIPPED | OUTPATIENT
Start: 2017-10-24 | End: 2017-12-29 | Stop reason: SDUPTHER

## 2017-10-31 RX ORDER — ATORVASTATIN CALCIUM 80 MG/1
TABLET, FILM COATED ORAL
Qty: 90 TABLET | Refills: 0 | Status: SHIPPED | OUTPATIENT
Start: 2017-10-31 | End: 2017-12-29 | Stop reason: SDUPTHER

## 2017-11-01 LAB
ALBUMIN SERPL-MCNC: 4.3 G/DL (ref 3.5–5)
ALBUMIN/GLOB SERPL: 1.5 G/DL (ref 1.1–2.5)
ALP SERPL-CCNC: 77 U/L (ref 24–120)
ALT SERPL-CCNC: 86 U/L (ref 0–54)
AST SERPL-CCNC: 48 U/L (ref 7–45)
BASOPHILS # BLD AUTO: 0.03 10*3/MM3 (ref 0–0.2)
BASOPHILS NFR BLD AUTO: 0.4 % (ref 0–2)
BILIRUB SERPL-MCNC: 1 MG/DL (ref 0.1–1)
BUN SERPL-MCNC: 16 MG/DL (ref 5–21)
BUN/CREAT SERPL: 11.8 (ref 7–25)
CALCIUM SERPL-MCNC: 9.4 MG/DL (ref 8.4–10.4)
CHLORIDE SERPL-SCNC: 104 MMOL/L (ref 98–110)
CHOLEST SERPL-MCNC: 202 MG/DL (ref 130–200)
CO2 SERPL-SCNC: 25 MMOL/L (ref 24–31)
CREAT SERPL-MCNC: 1.36 MG/DL (ref 0.5–1.4)
EOSINOPHIL # BLD AUTO: 0.22 10*3/MM3 (ref 0–0.7)
EOSINOPHIL NFR BLD AUTO: 3 % (ref 0–4)
ERYTHROCYTE [DISTWIDTH] IN BLOOD BY AUTOMATED COUNT: 13.7 % (ref 12–15)
GFR SERPLBLD CREATININE-BSD FMLA CKD-EPI: 52 ML/MIN/1.73
GFR SERPLBLD CREATININE-BSD FMLA CKD-EPI: 63 ML/MIN/1.73
GLOBULIN SER CALC-MCNC: 2.8 GM/DL
GLUCOSE SERPL-MCNC: 151 MG/DL (ref 70–100)
HCT VFR BLD AUTO: 41.5 % (ref 40–52)
HDLC SERPL-MCNC: 33 MG/DL
HGB BLD-MCNC: 13.6 G/DL (ref 14–18)
IMM GRANULOCYTES # BLD: 0.01 10*3/MM3 (ref 0–0.03)
IMM GRANULOCYTES NFR BLD: 0.1 % (ref 0–5)
LDLC SERPL CALC-MCNC: 136 MG/DL (ref 0–99)
LYMPHOCYTES # BLD AUTO: 2.41 10*3/MM3 (ref 0.72–4.86)
LYMPHOCYTES NFR BLD AUTO: 32.3 % (ref 15–45)
MCH RBC QN AUTO: 28.4 PG (ref 28–32)
MCHC RBC AUTO-ENTMCNC: 32.8 G/DL (ref 33–36)
MCV RBC AUTO: 86.6 FL (ref 82–95)
MONOCYTES # BLD AUTO: 0.78 10*3/MM3 (ref 0.19–1.3)
MONOCYTES NFR BLD AUTO: 10.5 % (ref 4–12)
NEUTROPHILS # BLD AUTO: 4 10*3/MM3 (ref 1.87–8.4)
NEUTROPHILS NFR BLD AUTO: 53.7 % (ref 39–78)
PLATELET # BLD AUTO: 219 10*3/MM3 (ref 130–400)
POTASSIUM SERPL-SCNC: 4.8 MMOL/L (ref 3.5–5.3)
PROT SERPL-MCNC: 7.1 G/DL (ref 6.3–8.7)
RBC # BLD AUTO: 4.79 10*6/MM3 (ref 4.8–5.9)
SODIUM SERPL-SCNC: 143 MMOL/L (ref 135–145)
TRIGL SERPL-MCNC: 163 MG/DL (ref 0–149)
VLDLC SERPL CALC-MCNC: 32.6 MG/DL
WBC # BLD AUTO: 7.45 10*3/MM3 (ref 4.8–10.8)

## 2017-11-28 DIAGNOSIS — E11.9 TYPE 2 DIABETES MELLITUS WITH HEMOGLOBIN A1C GOAL OF LESS THAN 7.0% (HCC): ICD-10-CM

## 2017-11-29 RX ORDER — GLIMEPIRIDE 2 MG/1
TABLET ORAL
Qty: 30 TABLET | Refills: 5 | Status: SHIPPED | OUTPATIENT
Start: 2017-11-29 | End: 2018-05-20 | Stop reason: SDUPTHER

## 2017-12-29 DIAGNOSIS — E11.9 TYPE 2 DIABETES MELLITUS WITH HEMOGLOBIN A1C GOAL OF LESS THAN 7.0% (HCC): ICD-10-CM

## 2017-12-29 DIAGNOSIS — E78.5 HYPERLIPIDEMIA, UNSPECIFIED HYPERLIPIDEMIA TYPE: ICD-10-CM

## 2017-12-29 DIAGNOSIS — I10 BENIGN ESSENTIAL HTN: Primary | ICD-10-CM

## 2017-12-29 RX ORDER — ATORVASTATIN CALCIUM 80 MG/1
80 TABLET, FILM COATED ORAL DAILY
Qty: 90 TABLET | Refills: 0 | Status: SHIPPED | OUTPATIENT
Start: 2017-12-29 | End: 2018-04-20 | Stop reason: SDUPTHER

## 2017-12-29 RX ORDER — METOPROLOL TARTRATE 50 MG/1
50 TABLET, FILM COATED ORAL EVERY 12 HOURS SCHEDULED
Qty: 180 TABLET | Refills: 0 | Status: SHIPPED | OUTPATIENT
Start: 2017-12-29 | End: 2018-04-02 | Stop reason: SDUPTHER

## 2017-12-29 RX ORDER — AMLODIPINE BESYLATE AND BENAZEPRIL HYDROCHLORIDE 10; 20 MG/1; MG/1
1 CAPSULE ORAL DAILY
Qty: 90 CAPSULE | Refills: 0 | Status: SHIPPED | OUTPATIENT
Start: 2017-12-29 | End: 2018-04-20 | Stop reason: SDUPTHER

## 2017-12-29 NOTE — TELEPHONE ENCOUNTER
Pharmacy requesting refills for Lotrel,atorvastatin,metformin, and metoprolol tartrate. Patient recently changed to [facility] [place]. He was seen Oct 2 for chronic issues, per note return in 6 months.

## 2018-03-27 DIAGNOSIS — I10 BENIGN ESSENTIAL HTN: ICD-10-CM

## 2018-03-27 RX ORDER — METOPROLOL TARTRATE 50 MG/1
50 TABLET, FILM COATED ORAL EVERY 12 HOURS SCHEDULED
Qty: 180 TABLET | Refills: 0 | OUTPATIENT
Start: 2018-03-27

## 2018-04-02 ENCOUNTER — OFFICE VISIT (OUTPATIENT)
Dept: FAMILY MEDICINE CLINIC | Facility: CLINIC | Age: 73
End: 2018-04-02

## 2018-04-02 VITALS
OXYGEN SATURATION: 98 % | TEMPERATURE: 98.1 F | RESPIRATION RATE: 16 BRPM | WEIGHT: 232 LBS | DIASTOLIC BLOOD PRESSURE: 75 MMHG | HEIGHT: 68 IN | HEART RATE: 54 BPM | SYSTOLIC BLOOD PRESSURE: 110 MMHG | BODY MASS INDEX: 35.16 KG/M2

## 2018-04-02 DIAGNOSIS — I10 ESSENTIAL HYPERTENSION: ICD-10-CM

## 2018-04-02 DIAGNOSIS — I10 BENIGN ESSENTIAL HTN: ICD-10-CM

## 2018-04-02 DIAGNOSIS — E11.9 TYPE 2 DIABETES MELLITUS WITH HEMOGLOBIN A1C GOAL OF LESS THAN 7.0% (HCC): Primary | ICD-10-CM

## 2018-04-02 DIAGNOSIS — Z11.59 NEED FOR HEPATITIS C SCREENING TEST: ICD-10-CM

## 2018-04-02 DIAGNOSIS — IMO0001 CLASS 2 OBESITY DUE TO EXCESS CALORIES WITH SERIOUS COMORBIDITY AND BODY MASS INDEX (BMI) OF 35.0 TO 35.9 IN ADULT: ICD-10-CM

## 2018-04-02 LAB — HBA1C MFR BLD: 6.7 %

## 2018-04-02 PROCEDURE — 83036 HEMOGLOBIN GLYCOSYLATED A1C: CPT | Performed by: FAMILY MEDICINE

## 2018-04-02 PROCEDURE — 99214 OFFICE O/P EST MOD 30 MIN: CPT | Performed by: FAMILY MEDICINE

## 2018-04-02 NOTE — PROGRESS NOTES
Subjective cc: HTN, DM   Cordell Carreon is a 72 y.o. male who presents for follow up on HTN and diabetes. Patient feels good, no complaints. He has been going to [facility] - does have some aches and pains for which he is taking naproxen. But he is glad that he is staying active.     Hypertension   This is a chronic problem. The current episode started more than 1 year ago. The problem is unchanged. The problem is controlled. Pertinent negatives include no blurred vision, chest pain, palpitations or shortness of breath. Agents associated with hypertension include NSAIDs (patient has been taking more naproxen). Risk factors for coronary artery disease include diabetes mellitus, dyslipidemia, male gender and obesity. Past treatments include ACE inhibitors and calcium channel blockers. Current antihypertension treatment includes ACE inhibitors, lifestyle changes, beta blockers and calcium channel blockers. The current treatment provides moderate improvement. Compliance problems include diet.  Hypertensive end-organ damage includes CAD/MI. There is no history of kidney disease, CVA, heart failure, left ventricular hypertrophy or retinopathy. Identifiable causes of hypertension include a hypertension causing med (NSAID).   Diabetes   He presents for his follow-up diabetic visit. He has type 2 diabetes mellitus. The initial diagnosis of diabetes was made 4 years ago. His disease course has been stable. There are no hypoglycemic associated symptoms. There are no diabetic associated symptoms. Pertinent negatives for diabetes include no blurred vision and no chest pain. There are no hypoglycemic complications. Symptoms are stable. 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 Pertinent negatives for diabetic complications include no CVA, nephropathy, peripheral neuropathy or retinopathy. Risk factors for coronary artery disease include diabetes mellitus, obesity, male sex, hypertension and  "dyslipidemia. Current diabetic treatment includes oral agent (dual therapy) (glimepiride, metformin). He is compliant with treatment all of the time. His weight is increasing steadily. Diabetic current diet: smaller portions, adding fruits, decrease snacks. When asked about meal planning, he reported none. He participates in exercise three times a week ([employer] 3 times weeks, ellipitical, treadmill, bicycle x 1 year ). He monitors blood glucose at home 1-2 x per week. Blood glucose monitoring compliance is good. There is no change in his home blood glucose trend. His overall blood glucose range is 140-180 mg/dl. An ACE inhibitor/angiotensin II receptor blocker is being taken. Eye exam is current (last week, Dr Valenzuela, no vision problems).        The following portions of the patient's history were reviewed and updated as appropriate: allergies, current medications, past family history, past medical history, past social history, past surgical history and problem list.        Review of Systems   Constitutional: Positive for activity change (going to [employer]). Negative for chills, fever and unexpected weight change.   Eyes: Negative for blurred vision.   Respiratory: Negative for cough, chest tightness, shortness of breath and wheezing.    Cardiovascular: Negative for chest pain and palpitations.   Gastrointestinal: Negative for abdominal pain, constipation, diarrhea, nausea and vomiting.   Endocrine:        Diabetes    Musculoskeletal: Positive for arthralgias.   All other systems reviewed and are negative.      Objective   Blood pressure 110/75, pulse 54, temperature 98.1 °F (36.7 °C), temperature source Oral, resp. rate 16, height 172.7 cm (68\"), weight 105 kg (232 lb), SpO2 98 %.  Physical Exam   Constitutional: He is oriented to person, place, and time. He appears well-developed and well-nourished. No distress.   HENT:   Head: Normocephalic and atraumatic.   Right Ear: External ear normal.   Left Ear: " External ear normal.   Nose: Nose normal.   Mouth/Throat: Oropharynx is clear and moist. No oropharyngeal exudate.   Neck: No tracheal deviation present. No thyromegaly present.   Cardiovascular: Normal rate, regular rhythm, normal heart sounds and intact distal pulses.    Pulmonary/Chest: Effort normal and breath sounds normal. No stridor. No respiratory distress. He has no wheezes.   Abdominal: Soft. Bowel sounds are normal. He exhibits no distension. There is no tenderness.   Lymphadenopathy:     He has no cervical adenopathy.   Neurological: He is alert and oriented to person, place, and time. He exhibits normal muscle tone. Coordination normal.   Skin: Skin is warm and dry. He is not diaphoretic.   Psychiatric: He has a normal mood and affect. His behavior is normal. Judgment and thought content normal.   Nursing note and vitals reviewed.      Assessment/Plan   Problems Addressed this Visit        Endocrine    Type 2 diabetes mellitus with hemoglobin A1c goal of less than 7.0% - Primary    Relevant Orders    POC Glycosylated Hemoglobin (Hb A1C) (Completed)       Other    Need for hepatitis C screening test    Relevant Orders    Hepatitis C antibody (Completed)      Other Visit Diagnoses     Class 2 obesity due to excess calories with serious comorbidity and body mass index (BMI) of 35.0 to 35.9 in adult        Essential hypertension              PLAN:     #1 Type 2 DM: well controlled, recent ha1c is 6.7. No change in medications, eye exam and foot exam UTD, advised to monitor skin care and foot care daily. Discussed dietary importance with regards to weight. Continue to get exercise for good heart health. Labs today     #2 obestiy with BMI 35.3: Patient's Body mass index is 35.28 kg/m². BMI is above normal parameters. Follow-up plan includes:  exercise counseling, nutrition counseling and pt going to [facility] - keep up good work .    #3 HTN: well controlled, decision to continue medications     #4  screening: labs today           This document has been electronically signed by Pallavi Mtz MD on April 12, 2018 5:56 PM

## 2018-04-03 LAB
ALBUMIN SERPL-MCNC: 4.4 G/DL (ref 3.5–5)
ALBUMIN/CREAT UR: 7.9 MG/G CREAT (ref 0–30)
ALBUMIN/GLOB SERPL: 1.4 G/DL (ref 1.1–2.5)
ALP SERPL-CCNC: 76 U/L (ref 24–120)
ALT SERPL-CCNC: 57 U/L (ref 0–54)
AST SERPL-CCNC: 36 U/L (ref 7–45)
BILIRUB SERPL-MCNC: 0.8 MG/DL (ref 0.1–1)
BUN SERPL-MCNC: 17 MG/DL (ref 5–21)
BUN/CREAT SERPL: 14.7 (ref 7–25)
CALCIUM SERPL-MCNC: 9.5 MG/DL (ref 8.4–10.4)
CHLORIDE SERPL-SCNC: 105 MMOL/L (ref 98–110)
CO2 SERPL-SCNC: 24 MMOL/L (ref 24–31)
CREAT SERPL-MCNC: 1.16 MG/DL (ref 0.5–1.4)
CREAT UR-MCNC: 203.1 MG/DL
GFR SERPLBLD CREATININE-BSD FMLA CKD-EPI: 62 ML/MIN/1.73
GFR SERPLBLD CREATININE-BSD FMLA CKD-EPI: 75 ML/MIN/1.73
GLOBULIN SER CALC-MCNC: 3.2 GM/DL
GLUCOSE SERPL-MCNC: 126 MG/DL (ref 70–100)
HCV AB S/CO SERPL IA: <0.1 S/CO RATIO (ref 0–0.9)
MICROALBUMIN UR-MCNC: 16.1 UG/ML
POTASSIUM SERPL-SCNC: 4.9 MMOL/L (ref 3.5–5.3)
PROT SERPL-MCNC: 7.6 G/DL (ref 6.3–8.7)
SODIUM SERPL-SCNC: 145 MMOL/L (ref 135–145)

## 2018-04-03 RX ORDER — METOPROLOL TARTRATE 50 MG/1
50 TABLET, FILM COATED ORAL EVERY 12 HOURS SCHEDULED
Qty: 180 TABLET | Refills: 1 | Status: SHIPPED | OUTPATIENT
Start: 2018-04-03 | End: 2018-10-01 | Stop reason: SDUPTHER

## 2018-04-20 DIAGNOSIS — E78.5 HYPERLIPIDEMIA, UNSPECIFIED HYPERLIPIDEMIA TYPE: ICD-10-CM

## 2018-04-20 DIAGNOSIS — I10 BENIGN ESSENTIAL HTN: ICD-10-CM

## 2018-04-20 RX ORDER — AMLODIPINE BESYLATE AND BENAZEPRIL HYDROCHLORIDE 10; 20 MG/1; MG/1
1 CAPSULE ORAL DAILY
Qty: 90 CAPSULE | Refills: 0 | Status: SHIPPED | OUTPATIENT
Start: 2018-04-20 | End: 2018-07-20 | Stop reason: SDUPTHER

## 2018-04-20 RX ORDER — ATORVASTATIN CALCIUM 80 MG/1
80 TABLET, FILM COATED ORAL DAILY
Qty: 90 TABLET | Refills: 0 | Status: SHIPPED | OUTPATIENT
Start: 2018-04-20 | End: 2018-07-20 | Stop reason: SDUPTHER

## 2018-05-20 DIAGNOSIS — E11.9 TYPE 2 DIABETES MELLITUS WITH HEMOGLOBIN A1C GOAL OF LESS THAN 7.0% (HCC): ICD-10-CM

## 2018-05-21 RX ORDER — GLIMEPIRIDE 2 MG/1
TABLET ORAL
Qty: 30 TABLET | Refills: 4 | Status: SHIPPED | OUTPATIENT
Start: 2018-05-21 | End: 2018-09-17 | Stop reason: SDUPTHER

## 2018-05-24 DIAGNOSIS — E11.9 TYPE 2 DIABETES MELLITUS WITH HEMOGLOBIN A1C GOAL OF LESS THAN 7.0% (HCC): ICD-10-CM

## 2018-07-20 DIAGNOSIS — E78.5 HYPERLIPIDEMIA, UNSPECIFIED HYPERLIPIDEMIA TYPE: ICD-10-CM

## 2018-07-20 DIAGNOSIS — I10 BENIGN ESSENTIAL HTN: ICD-10-CM

## 2018-07-20 RX ORDER — AMLODIPINE BESYLATE AND BENAZEPRIL HYDROCHLORIDE 10; 20 MG/1; MG/1
1 CAPSULE ORAL DAILY
Qty: 90 CAPSULE | Refills: 0 | Status: SHIPPED | OUTPATIENT
Start: 2018-07-20 | End: 2018-10-01 | Stop reason: SDUPTHER

## 2018-07-20 RX ORDER — ATORVASTATIN CALCIUM 80 MG/1
80 TABLET, FILM COATED ORAL DAILY
Qty: 90 TABLET | Refills: 0 | Status: SHIPPED | OUTPATIENT
Start: 2018-07-20 | End: 2018-10-01 | Stop reason: SDUPTHER

## 2018-07-20 NOTE — TELEPHONE ENCOUNTER
Pt had chronic OV appt on 04/02/2018. Follow Up is in 6M. Last refill was on 04/20/2018 for #90 no refills.

## 2018-07-25 DIAGNOSIS — E78.5 HYPERLIPIDEMIA, UNSPECIFIED HYPERLIPIDEMIA TYPE: ICD-10-CM

## 2018-07-25 RX ORDER — ATORVASTATIN CALCIUM 80 MG/1
80 TABLET, FILM COATED ORAL DAILY
Qty: 90 TABLET | Refills: 0 | OUTPATIENT
Start: 2018-07-25

## 2018-07-26 ENCOUNTER — TELEPHONE (OUTPATIENT)
Dept: FAMILY MEDICINE CLINIC | Facility: CLINIC | Age: 73
End: 2018-07-26

## 2018-07-26 NOTE — TELEPHONE ENCOUNTER
Pt left a message needing a refill on lipitor. Upon review pt received new order last Friday. Pt notified.

## 2018-08-20 DIAGNOSIS — E11.9 TYPE 2 DIABETES MELLITUS WITH HEMOGLOBIN A1C GOAL OF LESS THAN 7.0% (HCC): ICD-10-CM

## 2018-08-23 DIAGNOSIS — E11.9 TYPE 2 DIABETES MELLITUS WITH HEMOGLOBIN A1C GOAL OF LESS THAN 7.0% (HCC): ICD-10-CM

## 2018-09-17 DIAGNOSIS — E11.9 TYPE 2 DIABETES MELLITUS WITH HEMOGLOBIN A1C GOAL OF LESS THAN 7.0% (HCC): ICD-10-CM

## 2018-09-17 RX ORDER — GLIMEPIRIDE 2 MG/1
2 TABLET ORAL
Qty: 90 TABLET | Refills: 3 | Status: SHIPPED | OUTPATIENT
Start: 2018-09-17 | End: 2019-09-07 | Stop reason: SDUPTHER

## 2018-09-30 DIAGNOSIS — I10 BENIGN ESSENTIAL HTN: ICD-10-CM

## 2018-10-01 ENCOUNTER — OFFICE VISIT (OUTPATIENT)
Dept: FAMILY MEDICINE CLINIC | Facility: CLINIC | Age: 73
End: 2018-10-01

## 2018-10-01 VITALS
DIASTOLIC BLOOD PRESSURE: 87 MMHG | RESPIRATION RATE: 18 BRPM | WEIGHT: 228.3 LBS | OXYGEN SATURATION: 98 % | TEMPERATURE: 97.6 F | SYSTOLIC BLOOD PRESSURE: 148 MMHG | HEIGHT: 68 IN | HEART RATE: 57 BPM | BODY MASS INDEX: 34.6 KG/M2

## 2018-10-01 VITALS
BODY MASS INDEX: 34.59 KG/M2 | RESPIRATION RATE: 18 BRPM | WEIGHT: 228.2 LBS | TEMPERATURE: 97.6 F | DIASTOLIC BLOOD PRESSURE: 87 MMHG | OXYGEN SATURATION: 98 % | SYSTOLIC BLOOD PRESSURE: 148 MMHG | HEART RATE: 57 BPM | HEIGHT: 68 IN

## 2018-10-01 DIAGNOSIS — E78.5 HYPERLIPIDEMIA, UNSPECIFIED HYPERLIPIDEMIA TYPE: ICD-10-CM

## 2018-10-01 DIAGNOSIS — E11.8 TYPE 2 DIABETES MELLITUS WITH COMPLICATION, WITHOUT LONG-TERM CURRENT USE OF INSULIN (HCC): Primary | ICD-10-CM

## 2018-10-01 DIAGNOSIS — E78.2 MIXED HYPERLIPIDEMIA: ICD-10-CM

## 2018-10-01 DIAGNOSIS — I10 BENIGN ESSENTIAL HTN: ICD-10-CM

## 2018-10-01 DIAGNOSIS — R79.89 ELEVATED LFTS: ICD-10-CM

## 2018-10-01 DIAGNOSIS — I10 ESSENTIAL HYPERTENSION: ICD-10-CM

## 2018-10-01 DIAGNOSIS — Z00.00 MEDICARE ANNUAL WELLNESS VISIT, SUBSEQUENT: Primary | ICD-10-CM

## 2018-10-01 DIAGNOSIS — E11.9 TYPE 2 DIABETES MELLITUS WITH HEMOGLOBIN A1C GOAL OF LESS THAN 7.0% (HCC): ICD-10-CM

## 2018-10-01 DIAGNOSIS — Z23 FLU VACCINE NEED: ICD-10-CM

## 2018-10-01 LAB — HBA1C MFR BLD: 6.9 %

## 2018-10-01 PROCEDURE — G0008 ADMIN INFLUENZA VIRUS VAC: HCPCS | Performed by: FAMILY MEDICINE

## 2018-10-01 PROCEDURE — 99214 OFFICE O/P EST MOD 30 MIN: CPT | Performed by: FAMILY MEDICINE

## 2018-10-01 PROCEDURE — 83036 HEMOGLOBIN GLYCOSYLATED A1C: CPT | Performed by: FAMILY MEDICINE

## 2018-10-01 PROCEDURE — G0439 PPPS, SUBSEQ VISIT: HCPCS | Performed by: FAMILY MEDICINE

## 2018-10-01 PROCEDURE — 90662 IIV NO PRSV INCREASED AG IM: CPT | Performed by: FAMILY MEDICINE

## 2018-10-01 RX ORDER — METOPROLOL TARTRATE 50 MG/1
50 TABLET, FILM COATED ORAL EVERY 12 HOURS SCHEDULED
Qty: 180 TABLET | Refills: 1 | OUTPATIENT
Start: 2018-10-01

## 2018-10-01 RX ORDER — METOPROLOL TARTRATE 50 MG/1
50 TABLET, FILM COATED ORAL EVERY 12 HOURS SCHEDULED
Qty: 180 TABLET | Refills: 3 | Status: SHIPPED | OUTPATIENT
Start: 2018-10-01 | End: 2019-09-26 | Stop reason: SDUPTHER

## 2018-10-01 RX ORDER — ATORVASTATIN CALCIUM 80 MG/1
80 TABLET, FILM COATED ORAL DAILY
Qty: 90 TABLET | Refills: 3 | Status: SHIPPED | OUTPATIENT
Start: 2018-10-01 | End: 2019-10-24 | Stop reason: SDUPTHER

## 2018-10-01 RX ORDER — AMLODIPINE BESYLATE AND BENAZEPRIL HYDROCHLORIDE 10; 20 MG/1; MG/1
1 CAPSULE ORAL DAILY
Qty: 90 CAPSULE | Refills: 3 | Status: SHIPPED | OUTPATIENT
Start: 2018-10-01 | End: 2019-10-24 | Stop reason: SDUPTHER

## 2018-10-01 NOTE — PATIENT INSTRUCTIONS
Evolocumab injection  What is this medicine?  EVOLOCUMAB (e voe LUANNE ue mab) is known as a PCSK9 inhibitor. It is used to lower the level of cholesterol in the blood. It may be used alone or in combination with other cholesterol-lowering drugs. This drug may also be used to reduce the risk of heart attack, stroke, and certain types of heart surgery in patients with heart disease.  This medicine may be used for other purposes; ask your health care provider or pharmacist if you have questions.  COMMON BRAND NAME(S): REPATHA  What should I tell my health care provider before I take this medicine?  They need to know if you have any of these conditions:  -an unusual or allergic reaction to evolocumab, other medicines, foods, dyes, or preservatives  -pregnant or trying to get pregnant  -breast-feeding  How should I use this medicine?  This medicine is for injection under the skin. You will be taught how to prepare and give this medicine. Use exactly as directed. Take your medicine at regular intervals. Do not take your medicine more often than directed.  It is important that you put your used needles and syringes in a special sharps container. Do not put them in a trash can. If you do not have a sharps container, call your pharmacist or health care provider to get one.  Talk to your pediatrician regarding the use of this medicine in children. While this drug may be prescribed for children as young as 13 years for selected conditions, precautions do apply.  Overdosage: If you think you have taken too much of this medicine contact a poison control center or emergency room at once.  NOTE: This medicine is only for you. Do not share this medicine with others.  What if I miss a dose?  If you miss a dose, take it as soon as you can if there are more than 7 days until the next scheduled dose, or skip the missed dose and take the next dose according to your original schedule. Do not take double or extra doses.  What may interact  "with this medicine?  Interactions are not expected.  This list may not describe all possible interactions. Give your health care provider a list of all the medicines, herbs, non-prescription drugs, or dietary supplements you use. Also tell them if you smoke, drink alcohol, or use illegal drugs. Some items may interact with your medicine.  What should I watch for while using this medicine?  You may need blood work while you are taking this medicine.  What side effects may I notice from receiving this medicine?  Side effects that you should report to your doctor or health care professional as soon as possible:  -allergic reactions like skin rash, itching or hives, swelling of the face, lips, or tongue  -signs and symptoms of infection like fever or chills; cough; sore throat; pain or trouble passing urine  Side effects that usually do not require medical attention (report to your doctor or health care professional if they continue or are bothersome):  -diarrhea  -nausea  -muscle pain  -pain, redness, or irritation at site where injected  This list may not describe all possible side effects. Call your doctor for medical advice about side effects. You may report side effects to FDA at 1-668-FDA-9038.  Where should I keep my medicine?  Keep out of the reach of children.  You will be instructed on how to store this medicine. Throw away any unused medicine after the expiration date on the label.  NOTE: This sheet is a summary. It may not cover all possible information. If you have questions about this medicine, talk to your doctor, pharmacist, or health care provider.  © 2018 [employer] (2017-12-04 13:21:53)    DASH Eating Plan  DASH stands for \"Dietary Approaches to Stop Hypertension.\" The DASH eating plan is a healthy eating plan that has been shown to reduce high blood pressure (hypertension). It may also reduce your risk for type 2 diabetes, heart disease, and stroke. The DASH eating plan may also help with " "weight loss.  What are tips for following this plan?  General guidelines  · Avoid eating more than 2,300 mg (milligrams) of salt (sodium) a day. If you have hypertension, you may need to reduce your sodium intake to 1,500 mg a day.  · Limit alcohol intake to no more than 1 drink a day for nonpregnant women and 2 drinks a day for men. One drink equals 12 oz of beer, 5 oz of wine, or 1½ oz of hard liquor.  · Work with your health care provider to maintain a healthy body weight or to lose weight. Ask what an ideal weight is for you.  · Get at least 30 minutes of exercise that causes your heart to beat faster (aerobic exercise) most days of the week. Activities may include walking, swimming, or biking.  · Work with your health care provider or diet and nutrition specialist (dietitian) to adjust your eating plan to your individual calorie needs.  Reading food labels  · Check food labels for the amount of sodium per serving. Choose foods with less than 5 percent of the Daily Value of sodium. Generally, foods with less than 300 mg of sodium per serving fit into this eating plan.  · To find whole grains, look for the word \"whole\" as the first word in the ingredient list.  Shopping  · Buy products labeled as \"low-sodium\" or \"no salt added.\"  · Buy fresh foods. Avoid canned foods and premade or frozen meals.  Cooking  · Avoid adding salt when cooking. Use salt-free seasonings or herbs instead of table salt or sea salt. Check with your health care provider or pharmacist before using salt substitutes.  · Do not marcum foods. Cook foods using healthy methods such as baking, boiling, grilling, and broiling instead.  · Cook with heart-healthy oils, such as olive, canola, soybean, or sunflower oil.  Meal planning    · Eat a balanced diet that includes:  ? 5 or more servings of fruits and vegetables each day. At each meal, try to fill half of your plate with fruits and vegetables.  ? Up to 6-8 servings of whole grains each day.  ? Less " than 6 oz of lean meat, poultry, or fish each day. A 3-oz serving of meat is about the same size as a deck of cards. One egg equals 1 oz.  ? 2 servings of low-fat dairy each day.  ? A serving of nuts, seeds, or beans 5 times each week.  ? Heart-healthy fats. Healthy fats called Omega-3 fatty acids are found in foods such as flaxseeds and coldwater fish, like sardines, salmon, and mackerel.  · Limit how much you eat of the following:  ? Canned or prepackaged foods.  ? Food that is high in trans fat, such as fried foods.  ? Food that is high in saturated fat, such as fatty meat.  ? Sweets, desserts, sugary drinks, and other foods with added sugar.  ? Full-fat dairy products.  · Do not salt foods before eating.  · Try to eat at least 2 vegetarian meals each week.  · Eat more home-cooked food and less restaurant, buffet, and fast food.  · When eating at a restaurant, ask that your food be prepared with less salt or no salt, if possible.  What foods are recommended?  The items listed may not be a complete list. Talk with your dietitian about what dietary choices are best for you.  Grains  Whole-grain or whole-wheat bread. Whole-grain or whole-wheat pasta. Brown rice. Oatmeal. Quinoa. Bulgur. Whole-grain and low-sodium cereals. Regine bread. Low-fat, low-sodium crackers. Whole-wheat flour tortillas.  Vegetables  Fresh or frozen vegetables (raw, steamed, roasted, or grilled). Low-sodium or reduced-sodium tomato and vegetable juice. Low-sodium or reduced-sodium tomato sauce and tomato paste. Low-sodium or reduced-sodium canned vegetables.  Fruits  All fresh, dried, or frozen fruit. Canned fruit in natural juice (without added sugar).  Meat and other protein foods  Skinless chicken or turkey. Ground chicken or turkey. Pork with fat trimmed off. Fish and seafood. Egg whites. Dried beans, peas, or lentils. Unsalted nuts, nut butters, and seeds. Unsalted canned beans. Lean cuts of beef with fat trimmed off. Low-sodium, lean deli  meat.  Dairy  Low-fat (1%) or fat-free (skim) milk. Fat-free, low-fat, or reduced-fat cheeses. Nonfat, low-sodium ricotta or cottage cheese. Low-fat or nonfat yogurt. Low-fat, low-sodium cheese.  Fats and oils  Soft margarine without trans fats. Vegetable oil. Low-fat, reduced-fat, or light mayonnaise and salad dressings (reduced-sodium). Canola, safflower, olive, soybean, and sunflower oils. Avocado.  Seasoning and other foods  Herbs. Spices. Seasoning mixes without salt. Unsalted popcorn and pretzels. Fat-free sweets.  What foods are not recommended?  The items listed may not be a complete list. Talk with your dietitian about what dietary choices are best for you.  Grains  Baked goods made with fat, such as croissants, muffins, or some breads. Dry pasta or rice meal packs.  Vegetables  Creamed or fried vegetables. Vegetables in a cheese sauce. Regular canned vegetables (not low-sodium or reduced-sodium). Regular canned tomato sauce and paste (not low-sodium or reduced-sodium). Regular tomato and vegetable juice (not low-sodium or reduced-sodium). Pickles. Olives.  Fruits  Canned fruit in a light or heavy syrup. Fried fruit. Fruit in cream or butter sauce.  Meat and other protein foods  Fatty cuts of meat. Ribs. Fried meat. Arambula. Sausage. Bologna and other processed lunch meats. Salami. Fatback. Hotdogs. Bratwurst. Salted nuts and seeds. Canned beans with added salt. Canned or smoked fish. Whole eggs or egg yolks. Chicken or turkey with skin.  Dairy  Whole or 2% milk, cream, and half-and-half. Whole or full-fat cream cheese. Whole-fat or sweetened yogurt. Full-fat cheese. Nondairy creamers. Whipped toppings. Processed cheese and cheese spreads.  Fats and oils  Butter. Stick margarine. Lard. Shortening. Ghee. [place]ambula fat. Tropical oils, such as coconut, palm kernel, or palm oil.  Seasoning and other foods  Salted popcorn and pretzels. Onion salt, garlic salt, seasoned salt, table salt, and sea salt. [facility]  sauce. Tartar sauce. Barbecue sauce. Teriyaki sauce. Soy sauce, including reduced-sodium. Steak sauce. Canned and packaged gravies. Fish sauce. Oyster sauce. Cocktail sauce. Horseradish that you find on the shelf. Ketchup. Mustard. Meat flavorings and tenderizers. Bouillon cubes. Hot sauce and Tabasco sauce. Premade or packaged marinades. Premade or packaged taco seasonings. Relishes. Regular salad dressings.  Where to find more information:  · National Heart, [employer], [employer] [place]: www.nhlbi.nih.gov  · American Heart Association: www.heart.org  Summary  · The DASH eating plan is a healthy eating plan that has been shown to reduce high blood pressure (hypertension). It may also reduce your risk for type 2 diabetes, heart disease, and stroke.  · With the DASH eating plan, you should limit salt (sodium) intake to 2,300 mg a day. If you have hypertension, you may need to reduce your sodium intake to 1,500 mg a day.  · When on the DASH eating plan, aim to eat more fresh fruits and vegetables, whole grains, lean proteins, low-fat dairy, and heart-healthy fats.  · Work with your health care provider or diet and nutrition specialist (dietitian) to adjust your eating plan to your individual calorie needs.  This information is not intended to replace advice given to you by your health care provider. Make sure you discuss any questions you have with your health care provider.  Document Released: 12/06/2012 Document Revised: 12/11/2017 Document Reviewed: 12/11/2017  [employer] Interactive Patient Education © 2018 [employer]  Fat and Cholesterol Restricted Diet  High levels of fat and cholesterol in your blood may lead to various health problems, such as diseases of the heart, blood vessels, gallbladder, liver, and pancreas. Fats are concentrated sources of energy that come in various forms. Certain types of fat, including saturated fat, may be harmful in excess. Cholesterol is a substance needed by your body in small  "amounts. Your body makes all the cholesterol it needs. Excess cholesterol comes from the food you eat.  When you have high levels of cholesterol and saturated fat in your blood, health problems can develop because the excess fat and cholesterol will gather along the walls of your blood vessels, causing them to narrow. Choosing the right foods will help you control your intake of fat and cholesterol. This will help keep the levels of these substances in your blood within normal limits and reduce your risk of disease.  What is my plan?  Your health care provider recommends that you:  · Limit your fat intake to ______% or less of your total calories per day.  · Limit the amount of cholesterol in your diet to less than _________mg per day.  · Eat 20-30 grams of fiber each day.    What types of fat should I choose?  · Choose healthy fats more often. Choose monounsaturated and polyunsaturated fats, such as olive and canola oil, flaxseeds, walnuts, almonds, and seeds.  · Eat more omega-3 fats. Good choices include salmon, mackerel, sardines, tuna, flaxseed oil, and ground flaxseeds. Aim to eat fish at least two times a week.  · Limit saturated fats. Saturated fats are primarily found in animal products, such as meats, butter, and cream. Plant sources of saturated fats include palm oil, palm kernel oil, and coconut oil.  · Avoid foods with partially hydrogenated oils in them. These contain trans fats. Examples of foods that contain trans fats are stick margarine, some tub margarines, cookies, crackers, and other baked goods.  What general guidelines do I need to follow?  These guidelines for healthy eating will help you control your intake of fat and cholesterol:  · Check food labels carefully to identify foods with trans fats or high amounts of saturated fat.  · Fill one half of your plate with vegetables and green salads.  · Fill one fourth of your plate with whole grains. Look for the word \"whole\" as the first word in " the ingredient list.  · Fill one fourth of your plate with lean protein foods.  · Limit fruit to two servings a day. Choose fruit instead of juice.  · Eat more foods that contain fiber, such as apples, broccoli, carrots, beans, peas, and barley.  · Eat more home-cooked food and less restaurant, buffet, and fast food.  · Limit or avoid alcohol.  · Limit foods high in starch and sugar.  · Limit fried foods.  · Cook foods using methods other than frying. Baking, boiling, grilling, and broiling are all great options.  · Lose weight if you are overweight. Losing just 5-10% of your initial body weight can help your overall health and prevent diseases such as diabetes and heart disease.    What foods can I eat?  Grains  · Whole grains, such as whole wheat or whole grain breads, crackers, cereals, and pasta. Unsweetened oatmeal, bulgur, barley, quinoa, or brown rice. Corn or whole wheat flour tortillas.  Vegetables  · Fresh or frozen vegetables (raw, steamed, roasted, or grilled). Green salads.  Fruits  · All fresh, canned (in natural juice), or frozen fruits.  Meats and other protein foods  · Ground beef (85% or leaner), grass-fed beef, or beef trimmed of fat. Skinless chicken or turkey. Ground chicken or turkey. Pork trimmed of fat. All fish and seafood. Eggs. Dried beans, peas, or lentils. Unsalted nuts or seeds. Unsalted canned or dry beans.  Dairy  · Low-fat dairy products, such as skim or 1% milk, 2% or reduced-fat cheeses, low-fat ricotta or cottage cheese, or plain low-fat yo  Fats and oils  · Tub margarines without trans fats. Light or reduced-fat mayonnaise and salad dressings. Avocado. Olive, canola, sesame, or safflower oils. Natural peanut or almond butter (choose ones without added sugar and oil).  The items listed above may not be a complete list of recommended foods or beverages. Contact your dietitian for more options.  Foods to avoid  Grains  · White bread. White pasta. White rice. Cornbread. Bagels,  pastries, and croissants. Crackers that contain trans fat.  Vegetables  · White potatoes. Corn. Creamed or fried vegetables. Vegetables in a cheese sauce.  Fruits  · Dried fruits. Canned fruit in light or heavy syrup. Fruit juice.  Meats and other protein foods  · Fatty cuts of meat. Ribs, chicken wings, cota, sausage, bologna, salami, chitterlings, fatback, hot dogs, bratwurst, and packaged luncheon meats. Liver and organ meats.  Dairy  · Whole or 2% milk, cream, half-and-half, and cream cheese. Whole milk cheeses. Whole-fat or sweetened yogurt. Full-fat cheeses. Nondairy creamers and whipped toppings. Processed cheese, cheese spreads, or cheese curds.  Beverages  · Alcohol. Sweetened drinks (such as sodas, lemonade, and fruit drinks or punches).  Fats and oils  · Butter, stick margarine, lard, shortening, ghee, or cota fat. Coconut, palm kernel, or palm oils.  Sweets and desserts  · Corn syrup, sugars, honey, and molasses. Candy. Jam and jelly. Syrup. Sweetened cereals. Cookies, pies, cakes, donuts, muffins, and ice cream.  The items listed above may not be a complete list of foods and beverages to avoid. Contact your dietitian for more information.  This information is not intended to replace advice given to you by your health care provider. Make sure you discuss any questions you have with your health care provider.  Document Released: 12/18/2006 Document Revised: 01/08/2016 Document Reviewed: 03/18/2015  [employer] Interactive Patient Education © 2018 [employer]

## 2018-10-01 NOTE — PROGRESS NOTES
Subjective cc: HTN, DM   Cordell Carreon is a 73 y.o. male who presents for follow up on HTN and diabetes. No new problems or concerns. No hospital visits, no other dr visits since last appt.   HTN: Pt monitors it at home - mostly in 120s  Patient feels good, no complaints. He has been going to [employer] - does have some aches and pains for which he is taking naproxen. But he is glad that he is staying active.     Hypertension   This is a chronic problem. The current episode started more than 1 year ago. The problem is unchanged. The problem is controlled. Pertinent negatives include no blurred vision, chest pain, palpitations or shortness of breath. Agents associated with hypertension include NSAIDs (patient has been taking more naproxen). Risk factors for coronary artery disease include diabetes mellitus, dyslipidemia, male gender and obesity. Past treatments include ACE inhibitors and calcium channel blockers. Current antihypertension treatment includes ACE inhibitors, lifestyle changes, beta blockers and calcium channel blockers. The current treatment provides moderate improvement. Compliance problems include diet.  Hypertensive end-organ damage includes CAD/MI. There is no history of kidney disease, CVA, heart failure, left ventricular hypertrophy or retinopathy. Identifiable causes of hypertension include a hypertension causing med (NSAID).   Diabetes   He presents for his follow-up diabetic visit. He has type 2 diabetes mellitus. The initial diagnosis of diabetes was made 4 years ago. His disease course has been stable. There are no hypoglycemic associated symptoms. There are no diabetic associated symptoms. Pertinent negatives for diabetes include no blurred vision and no chest pain. There are no hypoglycemic complications. Symptoms are stable. 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 Pertinent negatives for diabetic complications include no CVA, nephropathy, peripheral  "neuropathy or retinopathy. Risk factors for coronary artery disease include diabetes mellitus, obesity, male sex, hypertension and dyslipidemia. Current diabetic treatment includes oral agent (dual therapy) (glimepiride, metformin). He is compliant with treatment all of the time. His weight is increasing steadily. Diabetic current diet: smaller portions, adding fruits, decrease snacks. When asked about meal planning, he reported none. He participates in exercise three times a week ([employer] 3 times weeks, ellipitical, treadmill, bicycle x 1 year ). He monitors blood glucose at home 1-2 x per week. Blood glucose monitoring compliance is good. There is no change in his home blood glucose trend. His overall blood glucose range is 140-180 mg/dl. An ACE inhibitor/angiotensin II receptor blocker is being taken. Eye exam is current (last week, Dr Valenzuela, no vision problems).   Hyperlipidemia   Pertinent negatives include no chest pain or shortness of breath.        The following portions of the patient's history were reviewed and updated as appropriate: allergies, current medications, past family history, past medical history, past social history, past surgical history and problem list.        Review of Systems   Constitutional: Positive for activity change (going to [employer]). Negative for chills, fever and unexpected weight change.   Eyes: Negative for blurred vision.   Respiratory: Negative for cough, chest tightness, shortness of breath and wheezing.    Cardiovascular: Negative for chest pain and palpitations.   Gastrointestinal: Negative for abdominal pain, constipation, diarrhea, nausea and vomiting.   Endocrine:        Diabetes    Musculoskeletal: Positive for arthralgias.   All other systems reviewed and are negative.      Objective   Blood pressure 148/87, pulse 57, temperature 97.6 °F (36.4 °C), temperature source Oral, resp. rate 18, height 172.7 cm (68\"), weight 104 kg (228 lb 3.2 oz), SpO2 98 " %.  Physical Exam   Constitutional: He is oriented to person, place, and time. He appears well-developed and well-nourished. No distress.   HENT:   Head: Normocephalic and atraumatic.   Right Ear: External ear normal.   Left Ear: External ear normal.   Nose: Nose normal.   Mouth/Throat: Oropharynx is clear and moist. No oropharyngeal exudate.   Neck: No tracheal deviation present. No thyromegaly present.   Cardiovascular: Normal rate, regular rhythm, normal heart sounds and intact distal pulses.    Pulmonary/Chest: Effort normal and breath sounds normal. No stridor. No respiratory distress. He has no wheezes.   Abdominal: Soft. Bowel sounds are normal. He exhibits no distension. There is no tenderness.   Lymphadenopathy:     He has no cervical adenopathy.   Neurological: He is alert and oriented to person, place, and time. He exhibits normal muscle tone. Coordination normal.   Skin: Skin is warm and dry. He is not diaphoretic.   Psychiatric: He has a normal mood and affect. His behavior is normal. Judgment and thought content normal.   Nursing note and vitals reviewed.    Lab Results (most recent)     Procedure Component Value Units Date/Time    POC Glycosylated Hemoglobin (Hb A1C) [[id number]]  (Abnormal) Collected:  10/01/18 1035    Specimen:  Blood Updated:  10/11/18 1316     Hemoglobin A1C 6.9 %        Assessment/Plan   Problems Addressed this Visit        Cardiovascular and Mediastinum    Benign essential HTN    Relevant Medications    amLODIPine-benazepril (LOTREL) 10-20 MG per capsule    metoprolol tartrate (LOPRESSOR) 50 MG tablet    Hyperlipidemia    Relevant Medications    atorvastatin (LIPITOR) 80 MG tablet    Other Relevant Orders    Lipid panel       Endocrine    Type 2 diabetes mellitus with hemoglobin A1c goal of less than 7.0% (CMS/[facility])    Relevant Medications    metFORMIN (GLUCOPHAGE) 1000 MG tablet      Other Visit Diagnoses     Type 2 diabetes mellitus with complication, without long-term current  use of insulin (CMS/[facility])    -  Primary    Relevant Medications    metFORMIN (GLUCOPHAGE) 1000 MG tablet    Other Relevant Orders    POC Glycosylated Hemoglobin (Hb A1C) (Completed)    Comprehensive metabolic panel    Lipid panel    Elevated LFTs        Relevant Orders    Comprehensive metabolic panel          PLAN:     #1 Type 2 DM: well controlled, recent ha1c is stable at 6.9.  Advised we will need to continue to monitor this.  It does show a slight increase from previous.  Advised patient it needs to remain less than 7.0.  No change in medications, eye exam and foot exam UTD, advised to monitor skin care and foot care daily. Discussed dietary importance with regards to weight. Continue to get exercise for good heart health.    #2 obesity: Patient's Body mass index is 34.7 kg/m². BMI is above normal parameters. Recommendations include: educational material, exercise counseling, nutrition counseling and Continue going to [email or web address].    #3 HTN: uncontrolled, continue to monitor at home - if consistently elevated contact office for medication adjustment appt, decision to continue medications at current dosing based on only 1 elevated reading.     #4 screening: labs reviewed, repeat lipid and CMP     #5 hyperlipidemia: Chronic, uncontrolled.  Continue on Lipitor, refill given.  Will recheck lipid panel.  Will contact patient with results.  Discussed use of repatha if appropriate.  Patient with elevated cholesterol and history of CABG.    #6 elevated LFTs: Labs reviewed, repeat CMP, avoid use of Tylenol.          This document has been electronically signed by Pallavi Mtz MD on October 1, 2018 11:04 AM

## 2018-10-01 NOTE — PROGRESS NOTES
QUICK REFERENCE INFORMATION:  The ABCs of the Annual Wellness Visit    Subsequent Medicare Wellness Visit    HEALTH RISK ASSESSMENT    1945    Recent Hospitalizations:  No hospitalization(s) within the last year..        Current Medical Providers:  Patient Care Team:  Pallavi Mtz MD as PCP - General (Family Medicine)  Dr Valenzuela - optho  Dr Broadbent - cardio         Smoking Status:  History   Smoking Status   • Never Smoker   Smokeless Tobacco   • Never Used       Alcohol Consumption:  History   Alcohol Use   • Yes     Comment: occ   once every 6 months     Depression Screen:   PHQ-2/PHQ-9 Depression Screening 10/1/2018   Little interest or pleasure in doing things 0   Feeling down, depressed, or hopeless 0   Total Score 0       Health Habits and Functional and Cognitive Screening:  Functional & Cognitive Status 10/1/2018   Do you have difficulty preparing food and eating? No   Do you have difficulty bathing yourself, getting dressed or grooming yourself? No   Do you have difficulty using the toilet? No   Do you have difficulty moving around from place to place? No   Do you have trouble with steps or getting out of a bed or a chair? No   In the past year have you fallen or experienced a near fall? No   Current Diet Well Balanced Diet   Dental Exam Up to date   Eye Exam Up to date   Exercise (times per week) 5 times per week   Current Exercise Activities Include Walking   Do you need help using the phone?  No   Are you deaf or do you have serious difficulty hearing?  No   Do you need help with transportation? No   Do you need help shopping? No   Do you need help preparing meals?  No   Do you need help with housework?  No   Do you need help with laundry? No   Do you need help taking your medications? No   Do you need help managing money? No   Do you ever drive or ride in a car without wearing a seat belt? No   Have you felt unusual stress, anger or loneliness in the last month? No   Who do you live with?  Spouse   If you need help, do you have trouble finding someone available to you? No   Have you been bothered in the last four weeks by sexual problems? No   Do you have difficulty concentrating, remembering or making decisions? No           Does the patient have evidence of cognitive impairment? No    Aspirin use counseling: Taking ASA appropriately as indicated      Recent Lab Results:  CMP:  Lab Results   Component Value Date     (H) 04/02/2018    BUN 17 04/02/2018    CREATININE 1.16 04/02/2018    EGFRIFNONA 62 04/02/2018    EGFRIFAFRI 75 04/02/2018    BCR 14.7 04/02/2018     04/02/2018    K 4.9 04/02/2018    CO2 24.0 04/02/2018    CALCIUM 9.5 04/02/2018    PROTENTOTREF 7.6 04/02/2018    ALBUMIN 4.40 04/02/2018    LABGLOBREF 3.2 04/02/2018    LABIL2 1.4 04/02/2018    BILITOT 0.8 04/02/2018    ALKPHOS 76 04/02/2018    AST 36 04/02/2018    ALT 57 (H) 04/02/2018     Lipid Panel:  Lab Results   Component Value Date    TRIG 163 (H) 10/31/2017    HDL 33 (L) 10/31/2017    VLDL 32.6 10/31/2017    LDLHDL 3.8 (H) 11/05/2015     HbA1c:  Lab Results   Component Value Date    HGBA1C 6.9 10/01/2018       Visual Acuity:  No exam data present    Age-appropriate Screening Schedule:  Refer to the list below for future screening recommendations based on patient's age, sex and/or medical conditions. Orders for these recommended tests are listed in the plan section. The patient has been provided with a written plan.    Health Maintenance   Topic Date Due   • ZOSTER VACCINE (2 of 2) 01/26/2017   • DIABETIC FOOT EXAM  10/02/2018   • HEMOGLOBIN A1C  10/02/2018   • LIPID PANEL  10/31/2018   • DIABETIC EYE EXAM  04/01/2019   • URINE MICROALBUMIN  04/02/2019   • TDAP/TD VACCINES (2 - Td) 12/01/2026   • COLONOSCOPY  05/18/2027   • INFLUENZA VACCINE  Completed   • PNEUMOCOCCAL VACCINES (65+ LOW/MEDIUM RISK)  Excluded        Subjective   History of Present Illness    Cordell Carreon is a 73 y.o. male who presents for an  Subsequent Wellness Visit.    The following portions of the patient's history were reviewed and updated as appropriate: allergies, current medications, past family history, past medical history, past social history, past surgical history and problem list.    Outpatient Medications Prior to Visit   Medication Sig Dispense Refill   • aspirin 81 MG EC tablet Take 81 mg by mouth Daily.     • glimepiride (AMARYL) 2 MG tablet Take 1 tablet by mouth Every Morning Before Breakfast. 90 tablet 3   • glucose blood (ONE TOUCH ULTRA TEST) test strip Test once daily. 100 each 12   • naproxen sodium (ALEVE) 220 MG tablet Take 220 mg by mouth Daily.     • ONETOUCH DELICA LANCETS FINE misc Test once daily. 100 each 12   • amLODIPine-benazepril (LOTREL) 10-20 MG per capsule TAKE 1 CAPSULE BY MOUTH DAILY. 90 capsule 0   • atorvastatin (LIPITOR) 80 MG tablet TAKE 1 TABLET BY MOUTH DAILY. 90 tablet 0   • metFORMIN (GLUCOPHAGE) 1000 MG tablet TAKE 1 TABLET BY MOUTH TWICE A DAY WITH FOOD 180 tablet 0   • metoprolol tartrate (LOPRESSOR) 50 MG tablet TAKE 1 TABLET BY MOUTH EVERY 12 (TWELVE) HOURS. 180 tablet 1     No facility-administered medications prior to visit.        Patient Active Problem List   Diagnosis   • Need for hepatitis C screening test   • Type 2 diabetes mellitus with hemoglobin A1c goal of less than 7.0% (CMS/HCC)   • H/O heart bypass surgery   • Benign essential HTN   • Hyperlipidemia   • BMI 35.0-35.9,adult   • History of colon polyps       Advance Care Planning:  has an advance directive - a copy has been provided and is in file    Identification of Risk Factors:  Risk factors include: weight  and cardiovascular risk.    Review of Systems    Compared to one year ago, the patient feels his physical health is the same.  Compared to one year ago, the patient feels his mental health is the same.    Objective     Physical Exam    Vitals:    10/01/18 1027   BP: 148/87   BP Location: Left arm   Patient Position: Sitting   Cuff  "Size: Adult   Pulse: 57   Resp: 18   Temp: 97.6 °F (36.4 °C)   TempSrc: Oral   SpO2: 98%   Weight: 104 kg (228 lb 4.8 oz)   Height: 172.7 cm (68\")   PainSc: 0-No pain       Patient's Body mass index is 34.71 kg/m². BMI is above normal parameters. Recommendations include: educational material, exercise counseling and nutrition counseling.      Assessment/Plan   Patient Self-Management and Personalized Health Advice  The patient has been provided with information about: diet, exercise, weight management and prevention of cardiac or vascular disease and preventive services including:   · Advance directive, Counseling for cardiovascular disease risk reduction, Diabetes screening, see lab orders, Exercise counseling provided, Influenza vaccine, Nutrition counseling provided, Zostavax vaccine (Herpes Zoster).    Visit Diagnoses:    ICD-10-CM ICD-9-CM   1. Medicare annual wellness visit, subsequent Z00.00 V70.0   2. Flu vaccine need Z23 V04.81       Orders Placed This Encounter   Procedures   • Flu Vaccine High Dose PF 65YR+ ([id number])       Outpatient Encounter Prescriptions as of 10/1/2018   Medication Sig Dispense Refill   • aspirin 81 MG EC tablet Take 81 mg by mouth Daily.     • glimepiride (AMARYL) 2 MG tablet Take 1 tablet by mouth Every Morning Before Breakfast. 90 tablet 3   • glucose blood (ONE TOUCH ULTRA TEST) test strip Test once daily. 100 each 12   • naproxen sodium (ALEVE) 220 MG tablet Take 220 mg by mouth Daily.     • ONETOUCH DELICA LANCETS FINE misc Test once daily. 100 each 12   • [DISCONTINUED] amLODIPine-benazepril (LOTREL) 10-20 MG per capsule TAKE 1 CAPSULE BY MOUTH DAILY. 90 capsule 0   • [DISCONTINUED] atorvastatin (LIPITOR) 80 MG tablet TAKE 1 TABLET BY MOUTH DAILY. 90 tablet 0   • [DISCONTINUED] metFORMIN (GLUCOPHAGE) 1000 MG tablet TAKE 1 TABLET BY MOUTH TWICE A DAY WITH FOOD 180 tablet 0   • [DISCONTINUED] metoprolol tartrate (LOPRESSOR) 50 MG tablet TAKE 1 TABLET BY MOUTH EVERY 12 (TWELVE) " HOURS. 180 tablet 1     No facility-administered encounter medications on file as of 10/1/2018.        Reviewed use of high risk medication in the elderly: not applicable  Reviewed for potential of harmful drug interactions in the elderly: not applicable    Follow Up:  Return in about 1 year (around 10/1/2019) for Medicare Wellness.     An After Visit Summary and PPPS with all of these plans were given to the patient.          This document has been electronically signed by Pallavi Mtz MD on October 1, 2018 6:43 PM

## 2018-10-01 NOTE — PATIENT INSTRUCTIONS
Medicare Wellness  Personal Prevention Plan of Service     Date of Office Visit:  10/01/2018  Encounter Provider:  Pallavi Mtz MD  Place of Service:  [facility]  Patient Name: Cordell Carreon  :  1945    As part of the Medicare Wellness portion of your visit today, we are providing you with this personalized preventive plan of services (PPPS). This plan is based upon recommendations of the [place] Preventive Services Task Force (USPSTF) and the Advisory Committee on Immunization Practices (ACIP).    This lists the preventive care services that should be considered, and provides dates of when you are due. Items listed as completed are up-to-date and do not require any further intervention.    Health Maintenance   Topic Date Due   • ZOSTER VACCINE (2 of 2) 2017   • DIABETIC FOOT EXAM  10/02/2018   • HEMOGLOBIN A1C  10/02/2018   • LIPID PANEL  10/31/2018   • DIABETIC EYE EXAM  2019   • URINE MICROALBUMIN  2019   • MEDICARE ANNUAL WELLNESS  10/01/2019   • TDAP/TD VACCINES (2 - Td) 2026   • COLONOSCOPY  2027   • HEPATITIS C SCREENING  Addressed   • INFLUENZA VACCINE  Completed   • PNEUMOCOCCAL VACCINES (65+ LOW/MEDIUM RISK)  Excluded       Orders Placed This Encounter   Procedures   • Flu Vaccine High Dose PF 65YR+ ([id number])       Return in about 1 year (around 10/1/2019) for Medicare Wellness.

## 2018-10-02 LAB
ALBUMIN SERPL-MCNC: 4.5 G/DL (ref 3.5–5)
ALBUMIN/GLOB SERPL: 1.5 G/DL (ref 1.1–2.5)
ALP SERPL-CCNC: 81 U/L (ref 24–120)
ALT SERPL-CCNC: 54 U/L (ref 0–54)
AST SERPL-CCNC: 41 U/L (ref 7–45)
BILIRUB SERPL-MCNC: 0.7 MG/DL (ref 0.1–1)
BUN SERPL-MCNC: 14 MG/DL (ref 5–21)
BUN/CREAT SERPL: 11.7 (ref 7–25)
CALCIUM SERPL-MCNC: 9.5 MG/DL (ref 8.4–10.4)
CHLORIDE SERPL-SCNC: 104 MMOL/L (ref 98–110)
CHOLEST SERPL-MCNC: 216 MG/DL (ref 130–200)
CO2 SERPL-SCNC: 23 MMOL/L (ref 24–31)
CREAT SERPL-MCNC: 1.2 MG/DL (ref 0.5–1.4)
GLOBULIN SER CALC-MCNC: 3.1 GM/DL
GLUCOSE SERPL-MCNC: 137 MG/DL (ref 70–100)
HDLC SERPL-MCNC: 35 MG/DL
LDLC SERPL CALC-MCNC: 140 MG/DL (ref 0–99)
POTASSIUM SERPL-SCNC: 4.9 MMOL/L (ref 3.5–5.3)
PROT SERPL-MCNC: 7.6 G/DL (ref 6.3–8.7)
SODIUM SERPL-SCNC: 142 MMOL/L (ref 135–145)
TRIGL SERPL-MCNC: 203 MG/DL (ref 0–149)
VLDLC SERPL CALC-MCNC: 40.6 MG/DL

## 2018-10-11 DIAGNOSIS — E78.2 MIXED HYPERLIPIDEMIA: ICD-10-CM

## 2018-10-11 DIAGNOSIS — Z95.1 HX OF CABG: Primary | ICD-10-CM

## 2018-10-19 ENCOUNTER — TELEPHONE (OUTPATIENT)
Dept: FAMILY MEDICINE CLINIC | Facility: CLINIC | Age: 73
End: 2018-10-19

## 2018-10-22 DIAGNOSIS — Z95.1 HX OF CABG: ICD-10-CM

## 2018-10-22 DIAGNOSIS — E78.2 MIXED HYPERLIPIDEMIA: Primary | ICD-10-CM

## 2018-11-02 ENCOUNTER — TELEPHONE (OUTPATIENT)
Dept: FAMILY MEDICINE CLINIC | Facility: CLINIC | Age: 73
End: 2018-11-02

## 2018-11-02 NOTE — TELEPHONE ENCOUNTER
Praluent medication representative here today. Message left for pt to return phone call regarding possible options.

## 2018-11-06 ENCOUNTER — TELEPHONE (OUTPATIENT)
Dept: FAMILY MEDICINE CLINIC | Facility: CLINIC | Age: 73
End: 2018-11-06

## 2018-11-06 NOTE — TELEPHONE ENCOUNTER
Attempted to call pt again regarding Praluent medication. Message left for pt to return phone call.

## 2018-11-12 ENCOUNTER — TELEPHONE (OUTPATIENT)
Dept: FAMILY MEDICINE CLINIC | Facility: CLINIC | Age: 73
End: 2018-11-12

## 2018-11-12 NOTE — TELEPHONE ENCOUNTER
Spoke with patient regarding information on medication Praluent. Pt denied wanting to try to apply for Pass Program that was offered.

## 2019-09-07 DIAGNOSIS — E11.9 TYPE 2 DIABETES MELLITUS WITH HEMOGLOBIN A1C GOAL OF LESS THAN 7.0% (HCC): ICD-10-CM

## 2019-09-09 RX ORDER — GLIMEPIRIDE 2 MG/1
TABLET ORAL
Qty: 90 TABLET | Refills: 3 | Status: SHIPPED | OUTPATIENT
Start: 2019-09-09 | End: 2020-09-11

## 2019-09-26 DIAGNOSIS — I10 BENIGN ESSENTIAL HTN: ICD-10-CM

## 2019-09-26 RX ORDER — METOPROLOL TARTRATE 50 MG/1
TABLET, FILM COATED ORAL
Qty: 180 TABLET | Refills: 3 | Status: SHIPPED | OUTPATIENT
Start: 2019-09-26 | End: 2020-10-02

## 2019-10-14 DIAGNOSIS — I10 BENIGN ESSENTIAL HTN: ICD-10-CM

## 2019-10-14 DIAGNOSIS — E78.5 HYPERLIPIDEMIA, UNSPECIFIED HYPERLIPIDEMIA TYPE: ICD-10-CM

## 2019-10-14 RX ORDER — AMLODIPINE BESYLATE AND BENAZEPRIL HYDROCHLORIDE 10; 20 MG/1; MG/1
CAPSULE ORAL
Qty: 90 CAPSULE | Refills: 3 | OUTPATIENT
Start: 2019-10-14

## 2019-10-14 RX ORDER — ATORVASTATIN CALCIUM 80 MG/1
TABLET, FILM COATED ORAL
Qty: 90 TABLET | Refills: 3 | OUTPATIENT
Start: 2019-10-14

## 2019-10-17 ENCOUNTER — OFFICE VISIT (OUTPATIENT)
Dept: FAMILY MEDICINE CLINIC | Facility: CLINIC | Age: 74
End: 2019-10-17

## 2019-10-17 VITALS
DIASTOLIC BLOOD PRESSURE: 64 MMHG | HEIGHT: 68 IN | OXYGEN SATURATION: 98 % | WEIGHT: 231.6 LBS | SYSTOLIC BLOOD PRESSURE: 132 MMHG | BODY MASS INDEX: 35.1 KG/M2 | RESPIRATION RATE: 18 BRPM | TEMPERATURE: 97.6 F | HEART RATE: 72 BPM

## 2019-10-17 DIAGNOSIS — E78.5 HYPERLIPIDEMIA, UNSPECIFIED HYPERLIPIDEMIA TYPE: ICD-10-CM

## 2019-10-17 DIAGNOSIS — E11.9 TYPE 2 DIABETES MELLITUS WITH HEMOGLOBIN A1C GOAL OF LESS THAN 7.0% (HCC): ICD-10-CM

## 2019-10-17 DIAGNOSIS — Z00.00 MEDICARE ANNUAL WELLNESS VISIT, SUBSEQUENT: Primary | ICD-10-CM

## 2019-10-17 DIAGNOSIS — I10 ESSENTIAL HYPERTENSION: ICD-10-CM

## 2019-10-17 DIAGNOSIS — Z23 NEEDS FLU SHOT: ICD-10-CM

## 2019-10-17 PROCEDURE — 90653 IIV ADJUVANT VACCINE IM: CPT | Performed by: FAMILY MEDICINE

## 2019-10-17 PROCEDURE — G0008 ADMIN INFLUENZA VIRUS VAC: HCPCS | Performed by: FAMILY MEDICINE

## 2019-10-17 PROCEDURE — G0439 PPPS, SUBSEQ VISIT: HCPCS | Performed by: FAMILY MEDICINE

## 2019-10-17 NOTE — PATIENT INSTRUCTIONS
Medicare Wellness  Personal Prevention Plan of Service     Date of Office Visit:  10/17/2019  Encounter Provider:  Pallavi Mtz MD  Place of Service:  [facility]  Patient Name: Cordell Carreon  :  1945    As part of the Medicare Wellness portion of your visit today, we are providing you with this personalized preventive plan of services (PPPS). This plan is based upon recommendations of the [place] Preventive Services Task Force (USPSTF) and the Advisory Committee on Immunization Practices (ACIP).    This lists the preventive care services that should be considered, and provides dates of when you are due. Items listed as completed are up-to-date and do not require any further intervention.    Health Maintenance   Topic Date Due   • ZOSTER VACCINE (2 of 2) 2017   • DIABETIC FOOT EXAM  10/02/2018   • LIPID PANEL  10/17/2019 (Originally 10/1/2019)   • URINE MICROALBUMIN  2019 (Originally 2019)   • HEMOGLOBIN A1C  2019 (Originally 2019)   • DIABETIC EYE EXAM  2020   • MEDICARE ANNUAL WELLNESS  10/17/2020   • TDAP/TD VACCINES (2 - Td) 2026   • COLONOSCOPY  2027   • INFLUENZA VACCINE  Completed   • HEPATITIS C SCREENING  Addressed   • PNEUMOCOCCAL VACCINES (65+ LOW/MEDIUM RISK)  Discontinued       Orders Placed This Encounter   Procedures   • Fluad Quad >65 years (2443-4230)   • Comprehensive Metabolic Panel   • Hemoglobin A1c   • Lipid Panel   • Microalbumin / Creatinine Urine Ratio - Urine, Clean Catch   • CBC & Differential     Order Specific Question:   Manual Differential     Answer:   No       Return for Medicare Wellness.          Obesity, Adult  Obesity is the condition of having too much total body fat. Being overweight or obese means that your weight is greater than what is considered healthy for your body size. Obesity is determined by a measurement called BMI. BMI is an estimate of body fat and is calculated  from height and weight. For adults, a BMI of 30 or higher is considered obese.  Obesity can eventually lead to other health concerns and major illnesses, including:  · Stroke.  · Coronary artery disease (CAD).  · Type 2 diabetes.  · Some types of cancer, including cancers of the colon, breast, uterus, and gallbladder.  · Osteoarthritis.  · High blood pressure (hypertension).  · High cholesterol.  · Sleep apnea.  · Gallbladder stones.  · Infertility problems.  What are the causes?  The main cause of obesity is taking in (consuming) more calories than your body uses for energy. Other factors that contribute to this condition may include:  · Being born with genes that make you more likely to become obese.  · Having a medical condition that causes obesity. These conditions include:  ? Hypothyroidism.  ? Polycystic ovarian syndrome (PCOS).  ? Binge-eating disorder.  ? Cushing syndrome.  · Taking certain medicines, such as steroids, antidepressants, and seizure medicines.  · Not being physically active (sedentary lifestyle).  · Living where there are limited places to exercise safely or buy healthy foods.  · Not getting enough sleep.  What increases the risk?  The following factors may increase your risk of this condition:  · Having a family history of obesity.  · Being a woman of -American descent.  · Being a man of  descent.  What are the signs or symptoms?  Having excessive body fat is the main symptom of this condition.  How is this diagnosed?  This condition may be diagnosed based on:  · Your symptoms.  · Your medical history.  · A physical exam. Your health care provider may measure:  ? Your BMI. If you are an adult with a BMI between 25 and less than 30, you are considered overweight. If you are an adult with a BMI of 30 or higher, you are considered obese.  ? The distances around your hips and your waist (circumferences). These may be compared to each other to help diagnose your condition.  ? Your  skinfold thickness. Your health care provider may gently pinch a fold of your skin and measure it.  How is this treated?  Treatment for this condition often includes changing your lifestyle. Treatment may include some or all of the following:  · Dietary changes. Work with your health care provider and a dietitian to set a weight-loss goal that is healthy and reasonable for you. Dietary changes may include eating:  ? Smaller portions. A portion size is the amount of a particular food that is healthy for you to eat at one time. This varies from person to person.  ? Low-calorie or low-fat options.  ? More whole grains, fruits, and vegetables.  · Regular physical activity. This may include aerobic activity (cardio) and strength training.  · Medicine to help you lose weight. Your health care provider may prescribe medicine if you are unable to lose 1 pound a week after 6 weeks of eating more healthily and doing more physical activity.  · Surgery. Surgical options may include gastric banding and gastric bypass. Surgery may be done if:  ? Other treatments have not helped to improve your condition.  ? You have a BMI of 40 or higher.  ? You have life-threatening health problems related to obesity.  Follow these instructions at home:    Eating and drinking    · Follow recommendations from your health care provider about what you eat and drink. Your health care provider may advise you to:  ? Limit fast foods, sweets, and processed snack foods.  ? Choose low-fat options, such as low-fat milk instead of whole milk.  ? Eat 5 or more servings of fruits or vegetables every day.  ? Eat at home more often. This gives you more control over what you eat.  ? Choose healthy foods when you eat out.  ? Learn what a healthy portion size is.  ? Keep low-fat snacks on hand.  ? Avoid sugary drinks, such as soda, fruit juice, iced tea sweetened with sugar, and flavored milk.  ? Eat a healthy breakfast.  · Drink enough water to keep your urine  clear or pale yellow.  · Do not go without eating for long periods of time (do not fast) or follow a fad diet. Fasting and fad diets can be unhealthy and even dangerous.  Physical Activity  · Exercise regularly, as told by your health care provider. Ask your health care provider what types of exercise are safe for you and how often you should exercise.  · Warm up and stretch before being active.  · Cool down and stretch after being active.  · Rest between periods of activity.  Lifestyle  · Limit the time that you spend in front of your TV, computer, or video game system.  · Find ways to reward yourself that do not involve food.  · Limit alcohol intake to no more than 1 drink a day for nonpregnant women and 2 drinks a day for men. One drink equals 12 oz of beer, 5 oz of wine, or 1½ oz of hard liquor.  General instructions  · Keep a weight loss journal to keep track of the food you eat and how much you exercise you get.  · Take over-the-counter and prescription medicines only as told by your health care provider.  · Take vitamins and supplements only as told by your health care provider.  · Consider joining a support group. Your health care provider may be able to recommend a support group.  · Keep all follow-up visits as told by your health care provider. This is important.  Contact a health care provider if:  · You are unable to meet your weight loss goal after 6 weeks of dietary and lifestyle changes.  This information is not intended to replace advice given to you by your health care provider. Make sure you discuss any questions you have with your health care provider.  Document Released: 01/25/2006 Document Revised: 05/22/2017 Document Reviewed: 10/05/2016  [employer] Patient Education © 2019 [employer]

## 2019-10-17 NOTE — PROGRESS NOTES
The ABCs of the Annual Wellness Visit  Subsequent Medicare Wellness Visit    Chief Complaint   Patient presents with   • Medicare Wellness-subsequent     Pt here for medicare wellness       Subjective   History of Present Illness:  Cordell Carreon is a 74 y.o. male who presents for a Subsequent Medicare Wellness Visit.    HEALTH RISK ASSESSMENT    Recent Hospitalizations:  No hospitalization(s) within the last year.    Current Medical Providers:  Patient Care Team:  Pallavi Mtz MD as PCP - General (Family Medicine)  Carlos Valenzuela OD as PCP - Claims Attributed    Smoking Status:  Social History     Tobacco Use   Smoking Status Never Smoker   Smokeless Tobacco Never Used       Alcohol Consumption:  Social History     Substance and Sexual Activity   Alcohol Use Yes    Comment: occ       Depression Screen:   PHQ-2/PHQ-9 Depression Screening 10/17/2019   Little interest or pleasure in doing things 0   Feeling down, depressed, or hopeless 0   Total Score 0       Fall Risk Screen:  SANDRA Fall Risk Assessment was completed, and patient is at LOW risk for falls.Assessment completed on:10/17/2019    Health Habits and Functional and Cognitive Screening:  Functional & Cognitive Status 10/17/2019   Do you have difficulty preparing food and eating? No   Do you have difficulty bathing yourself, getting dressed or grooming yourself? No   Do you have difficulty using the toilet? No   Do you have difficulty moving around from place to place? No   Do you have trouble with steps or getting out of a bed or a chair? No   Current Diet Well Balanced Diet   Dental Exam Up to date   Eye Exam Up to date   Exercise (times per week) 3 times per week   Current Exercise Activities Include Walking   Do you need help using the phone?  No   Are you deaf or do you have serious difficulty hearing?  No   Do you need help with transportation? No   Do you need help shopping? No   Do you need help preparing meals?  No   Do you need help with  housework?  No   Do you need help with laundry? No   Do you need help taking your medications? No   Do you need help managing money? No   Do you ever drive or ride in a car without wearing a seat belt? No   Have you felt unusual stress, anger or loneliness in the last month? No   Who do you live with? Spouse   If you need help, do you have trouble finding someone available to you? No   Have you been bothered in the last four weeks by sexual problems? No   Do you have difficulty concentrating, remembering or making decisions? No         Does the patient have evidence of cognitive impairment? No    Asprin use counseling:Taking ASA appropriately as indicated    Age-appropriate Screening Schedule:  Refer to the list below for future screening recommendations based on patient's age, sex and/or medical conditions. Orders for these recommended tests are listed in the plan section. The patient has been provided with a written plan.    Health Maintenance   Topic Date Due   • ZOSTER VACCINE (2 of 2) 01/26/2017   • DIABETIC FOOT EXAM  10/02/2018   • LIPID PANEL  10/17/2019 (Originally 10/1/2019)   • URINE MICROALBUMIN  11/04/2019 (Originally 4/2/2019)   • HEMOGLOBIN A1C  11/08/2019 (Originally 4/1/2019)   • DIABETIC EYE EXAM  07/01/2020   • TDAP/TD VACCINES (2 - Td) 12/01/2026   • COLONOSCOPY  05/18/2027   • INFLUENZA VACCINE  Completed   • PNEUMOCOCCAL VACCINES (65+ LOW/MEDIUM RISK)  Discontinued          The following portions of the patient's history were reviewed and updated as appropriate: allergies, current medications, past family history, past medical history, past social history, past surgical history and problem list.    Outpatient Medications Prior to Visit   Medication Sig Dispense Refill   • amLODIPine-benazepril (LOTREL) 10-20 MG per capsule Take 1 capsule by mouth Daily. 90 capsule 3   • aspirin 81 MG EC tablet Take 81 mg by mouth Daily.     • atorvastatin (LIPITOR) 80 MG tablet Take 1 tablet by mouth Daily. 90  "tablet 3   • glimepiride (AMARYL) 2 MG tablet TAKE 1 TABLET BY MOUTH EVERY DAY BEFORE BREAKFAST 90 tablet 3   • glucose blood (ONE TOUCH ULTRA TEST) test strip Test once daily. 100 each 12   • metFORMIN (GLUCOPHAGE) 1000 MG tablet Take 1 tablet by mouth 2 (Two) Times a Day With Meals. 180 tablet 3   • metoprolol tartrate (LOPRESSOR) 50 MG tablet TAKE 1 TABLET BY MOUTH EVERY 12 HOURS 180 tablet 3   • naproxen sodium (ALEVE) 220 MG tablet Take 220 mg by mouth Daily.     • ONETOUCH DELICA LANCETS FINE misc Test once daily. 100 each 12     No facility-administered medications prior to visit.        Patient Active Problem List   Diagnosis   • Need for hepatitis C screening test   • Type 2 diabetes mellitus with hemoglobin A1c goal of less than 7.0% (CMS/[facility])   • H/O heart bypass surgery   • Benign essential HTN   • Hyperlipidemia   • BMI 35.0-35.9,adult   • History of colon polyps       Advanced Care Planning:  Patient has an advance directive - a copy has not been provided. Have asked the patient to send this to us to add to record    Review of Systems    Compared to one year ago, the patient feels his physical health is the same.  Compared to one year ago, the patient feels his mental health is better.    Reviewed chart for potential of high risk medication in the elderly: yes  Reviewed chart for potential of harmful drug interactions in the elderly:yes    Objective         Vitals:    10/17/19 1337   BP: 132/64   BP Location: Left arm   Patient Position: Sitting   Cuff Size: Adult   Pulse: 72   Resp: 18   Temp: 97.6 °F (36.4 °C)   TempSrc: Oral   SpO2: 98%   Weight: 105 kg (231 lb 9.6 oz)   Height: 172.7 cm (68\")   PainSc: 0-No pain       Body mass index is 35.21 kg/m².  Discussed the patient's BMI with him. The BMI is above average; BMI management plan is completed.    Physical Exam          Assessment/Plan   Medicare Risks and Personalized Health Plan  CMS Preventative Services Quick Reference  Advance Directive " Discussion  Cardiovascular risk  Colon Cancer Screening  Dementia/Memory   Depression/Dysphoria  Diabetic Lab Screening   Immunizations Discussed/Encouraged (specific immunizations; adacel Tdap, Influenza, Pneumococcal 23, Prevnar and Shingrix )  Obesity/Overweight     The above risks/problems have been discussed with the patient.  Pertinent information has been shared with the patient in the After Visit Summary.  Follow up plans and orders are seen below in the Assessment/Plan Section.    Diagnoses and all orders for this visit:    1. Medicare annual wellness visit, subsequent (Primary)    2. Needs flu shot  -     Fluad Quad >65 years (9832-2841)    3. Type 2 diabetes mellitus with hemoglobin A1c goal of less than 7.0% (CMS/[facility])  -     Comprehensive Metabolic Panel  -     CBC & Differential  -     Hemoglobin A1c  -     Microalbumin / Creatinine Urine Ratio - Urine, Clean Catch    4. Hyperlipidemia, unspecified hyperlipidemia type  -     Lipid Panel    5. Essential hypertension  -     Comprehensive Metabolic Panel  -     CBC & Differential      Follow Up:  Return for Medicare Wellness, 6 months for DM follow up .     An After Visit Summary and PPPS were given to the patient.

## 2019-10-18 LAB
ALBUMIN SERPL-MCNC: 4.6 G/DL (ref 3.5–5.2)
ALBUMIN/CREAT UR: 25.4 MG/G CREAT (ref 0–30)
ALBUMIN/GLOB SERPL: 1.6 G/DL
ALP SERPL-CCNC: 79 U/L (ref 39–117)
ALT SERPL-CCNC: 50 U/L (ref 1–41)
AST SERPL-CCNC: 46 U/L (ref 1–40)
BASOPHILS # BLD AUTO: 0.05 10*3/MM3 (ref 0–0.2)
BASOPHILS NFR BLD AUTO: 0.6 % (ref 0–1.5)
BILIRUB SERPL-MCNC: 0.7 MG/DL (ref 0.2–1.2)
BUN SERPL-MCNC: 12 MG/DL (ref 8–23)
BUN/CREAT SERPL: 9.3 (ref 7–25)
CALCIUM SERPL-MCNC: 9.1 MG/DL (ref 8.6–10.5)
CHLORIDE SERPL-SCNC: 101 MMOL/L (ref 98–107)
CHOLEST SERPL-MCNC: 223 MG/DL (ref 0–200)
CO2 SERPL-SCNC: 23.8 MMOL/L (ref 22–29)
CREAT SERPL-MCNC: 1.29 MG/DL (ref 0.76–1.27)
CREAT UR-MCNC: 120.7 MG/DL
EOSINOPHIL # BLD AUTO: 0.2 10*3/MM3 (ref 0–0.4)
EOSINOPHIL NFR BLD AUTO: 2.5 % (ref 0.3–6.2)
ERYTHROCYTE [DISTWIDTH] IN BLOOD BY AUTOMATED COUNT: 13.3 % (ref 12.3–15.4)
GLOBULIN SER CALC-MCNC: 2.8 GM/DL
GLUCOSE SERPL-MCNC: 231 MG/DL (ref 65–99)
HBA1C MFR BLD: 10.2 % (ref 4.8–5.6)
HCT VFR BLD AUTO: 42.3 % (ref 37.5–51)
HDLC SERPL-MCNC: 29 MG/DL (ref 40–60)
HGB BLD-MCNC: 13.9 G/DL (ref 13–17.7)
IMM GRANULOCYTES # BLD AUTO: 0.02 10*3/MM3 (ref 0–0.05)
IMM GRANULOCYTES NFR BLD AUTO: 0.2 % (ref 0–0.5)
LDLC SERPL CALC-MCNC: 147 MG/DL (ref 0–100)
LYMPHOCYTES # BLD AUTO: 2.5 10*3/MM3 (ref 0.7–3.1)
LYMPHOCYTES NFR BLD AUTO: 30.9 % (ref 19.6–45.3)
MCH RBC QN AUTO: 28.4 PG (ref 26.6–33)
MCHC RBC AUTO-ENTMCNC: 32.9 G/DL (ref 31.5–35.7)
MCV RBC AUTO: 86.5 FL (ref 79–97)
MICROALBUMIN UR-MCNC: 30.7 UG/ML
MONOCYTES # BLD AUTO: 0.71 10*3/MM3 (ref 0.1–0.9)
MONOCYTES NFR BLD AUTO: 8.8 % (ref 5–12)
NEUTROPHILS # BLD AUTO: 4.6 10*3/MM3 (ref 1.7–7)
NEUTROPHILS NFR BLD AUTO: 57 % (ref 42.7–76)
NRBC BLD AUTO-RTO: 0.1 /100 WBC (ref 0–0.2)
PLATELET # BLD AUTO: 222 10*3/MM3 (ref 140–450)
POTASSIUM SERPL-SCNC: 5.3 MMOL/L (ref 3.5–5.2)
PROT SERPL-MCNC: 7.4 G/DL (ref 6–8.5)
RBC # BLD AUTO: 4.89 10*6/MM3 (ref 4.14–5.8)
SODIUM SERPL-SCNC: 140 MMOL/L (ref 136–145)
TRIGL SERPL-MCNC: 233 MG/DL (ref 0–150)
VLDLC SERPL CALC-MCNC: 46.6 MG/DL
WBC # BLD AUTO: 8.08 10*3/MM3 (ref 3.4–10.8)

## 2019-10-24 ENCOUNTER — OFFICE VISIT (OUTPATIENT)
Dept: FAMILY MEDICINE CLINIC | Facility: CLINIC | Age: 74
End: 2019-10-24

## 2019-10-24 VITALS
BODY MASS INDEX: 34.86 KG/M2 | SYSTOLIC BLOOD PRESSURE: 148 MMHG | DIASTOLIC BLOOD PRESSURE: 78 MMHG | OXYGEN SATURATION: 97 % | HEART RATE: 57 BPM | WEIGHT: 230 LBS | TEMPERATURE: 97.6 F | HEIGHT: 68 IN | RESPIRATION RATE: 18 BRPM

## 2019-10-24 DIAGNOSIS — I10 BENIGN ESSENTIAL HTN: ICD-10-CM

## 2019-10-24 DIAGNOSIS — E78.5 HYPERLIPIDEMIA, UNSPECIFIED HYPERLIPIDEMIA TYPE: ICD-10-CM

## 2019-10-24 DIAGNOSIS — E66.01 CLASS 2 SEVERE OBESITY DUE TO EXCESS CALORIES WITH SERIOUS COMORBIDITY AND BODY MASS INDEX (BMI) OF 35.0 TO 35.9 IN ADULT (HCC): ICD-10-CM

## 2019-10-24 DIAGNOSIS — Z95.1 H/O HEART BYPASS SURGERY: ICD-10-CM

## 2019-10-24 DIAGNOSIS — E11.9 TYPE 2 DIABETES MELLITUS WITH HEMOGLOBIN A1C GOAL OF LESS THAN 7.0% (HCC): ICD-10-CM

## 2019-10-24 DIAGNOSIS — E11.65 TYPE 2 DIABETES MELLITUS WITH HYPERGLYCEMIA, WITHOUT LONG-TERM CURRENT USE OF INSULIN (HCC): Primary | ICD-10-CM

## 2019-10-24 PROBLEM — E66.812 CLASS 2 SEVERE OBESITY DUE TO EXCESS CALORIES WITH SERIOUS COMORBIDITY AND BODY MASS INDEX (BMI) OF 35.0 TO 35.9 IN ADULT: Status: ACTIVE | Noted: 2019-10-24

## 2019-10-24 PROCEDURE — 99214 OFFICE O/P EST MOD 30 MIN: CPT | Performed by: FAMILY MEDICINE

## 2019-10-24 RX ORDER — ATORVASTATIN CALCIUM 80 MG/1
80 TABLET, FILM COATED ORAL DAILY
Qty: 90 TABLET | Refills: 3 | Status: SHIPPED | OUTPATIENT
Start: 2019-10-24 | End: 2020-10-06

## 2019-10-24 RX ORDER — AMLODIPINE BESYLATE AND BENAZEPRIL HYDROCHLORIDE 10; 20 MG/1; MG/1
1 CAPSULE ORAL DAILY
Qty: 90 CAPSULE | Refills: 3 | Status: SHIPPED | OUTPATIENT
Start: 2019-10-24 | End: 2020-10-06

## 2019-10-24 NOTE — PROGRESS NOTES
"Subjective cc: DM   Cordell Carreon is a 74 y.o. male who presents for follow up on DM.   His ha1c jumped up from 6.9 to 10.2 over the last year - he denies any significant changes.   No FH of thyroid CA or personal h/o pancreatitis.     Diabetes   He presents for his follow-up diabetic visit. He has type 2 diabetes mellitus. The initial diagnosis of diabetes was made 8 years ago. His disease course has been worsening. There are no hypoglycemic associated symptoms. Pertinent negatives for diabetes include no fatigue. There are no hypoglycemic complications. Symptoms are stable. Diabetic complications include heart disease and nephropathy. Risk factors for coronary artery disease include diabetes mellitus, dyslipidemia, obesity, male sex, hypertension and sedentary lifestyle. Current diabetic treatment includes oral agent (dual therapy) (metformin and glipizide). He is compliant with treatment all of the time. His weight is stable. He is following a generally unhealthy diet. When asked about meal planning, he reported none. An ACE inhibitor/angiotensin II receptor blocker is being taken. He does not see a podiatrist.Eye exam is current (July ).        The following portions of the patient's history were reviewed and updated as appropriate: allergies, current medications, past family history, past medical history, past social history, past surgical history and problem list.        Review of Systems   Constitutional: Negative for activity change, appetite change, chills, fatigue and fever.   Respiratory: Negative for cough and shortness of breath.    Gastrointestinal: Negative for abdominal pain, constipation, diarrhea, nausea and vomiting.   All other systems reviewed and are negative.      Objective   Blood pressure 148/78, pulse 57, temperature 97.6 °F (36.4 °C), temperature source Oral, resp. rate 18, height 172.7 cm (68\"), weight 104 kg (230 lb), SpO2 97 %.  Physical Exam   Constitutional: He is oriented to " person, place, and time. He appears well-developed and well-nourished. No distress.   HENT:   Head: Normocephalic and atraumatic.   Nose: Nose normal.   Mouth/Throat: Oropharynx is clear and moist.   Eyes: Conjunctivae and EOM are normal. Right eye exhibits no discharge. Left eye exhibits no discharge.   Neck: Normal range of motion. No tracheal deviation present. No thyromegaly present.   Cardiovascular: Normal rate, regular rhythm, normal heart sounds and intact distal pulses.   Pulmonary/Chest: Effort normal and breath sounds normal. No stridor. No respiratory distress. He has no wheezes. He exhibits no tenderness.   Abdominal: Soft. Bowel sounds are normal. He exhibits no distension. There is no tenderness.   Musculoskeletal: Normal range of motion. He exhibits no edema.   Lymphadenopathy:     He has no cervical adenopathy.   Neurological: He is alert and oriented to person, place, and time. He exhibits normal muscle tone. Coordination normal.   Skin: Skin is warm and dry. He is not diaphoretic.   Psychiatric: He has a normal mood and affect. His behavior is normal. Judgment and thought content normal.   Nursing note and vitals reviewed.      Assessment/Plan   Problems Addressed this Visit        Cardiovascular and Mediastinum    Benign essential HTN    Relevant Medications    amLODIPine-benazepril (LOTREL) 10-20 MG per capsule    Hyperlipidemia    Relevant Medications    atorvastatin (LIPITOR) 80 MG tablet       Digestive    Class 2 severe obesity due to excess calories with serious comorbidity and body mass index (BMI) of 35.0 to 35.9 in adult (CMS/[facility])       Endocrine    Type 2 diabetes mellitus with hyperglycemia, without long-term current use of insulin (CMS/HCC) - Primary    Relevant Medications    metFORMIN (GLUCOPHAGE) 1000 MG tablet    Semaglutide,0.25 or 0.5MG/DOS, (OZEMPIC) 2 MG/1.5ML solution pen-injector       Other    H/O heart bypass surgery          PLAN:     #1 DM: chronic, uncontrolled with  hyperglycemia, will start on ozempic, conitnue on current medications, recheck in 3 months, eye exam UTD     #2 HTN: chronic, controlled, continue on current medication     #3 HLD: chronic, uncontrolled, on statin - refill given, advised on lifestyle changes     #4 obesity: Patient's Body mass index is 34.97 kg/m². BMI is above normal parameters. Recommendations include: educational material, exercise counseling and nutrition counseling.          This document has been electronically signed by Pallavi Mtz MD on October 24, 2019 5:36 PM

## 2019-10-25 ENCOUNTER — TELEPHONE (OUTPATIENT)
Dept: FAMILY MEDICINE CLINIC | Facility: CLINIC | Age: 74
End: 2019-10-25

## 2019-10-25 DIAGNOSIS — E11.65 TYPE 2 DIABETES MELLITUS WITH HYPERGLYCEMIA, WITHOUT LONG-TERM CURRENT USE OF INSULIN (HCC): Primary | ICD-10-CM

## 2019-10-25 NOTE — TELEPHONE ENCOUNTER
Patient called to report that it is going to cost over 400.00 to have Ozempic filled. Pt is requesting that something else be ordered. Please review and advise.

## 2019-10-25 NOTE — TELEPHONE ENCOUNTER
januvia ordered, ozempic canceled    Please find out from pharmacy if any of these will be well covered or if he is in donut hole because of his medicare?

## 2020-01-24 ENCOUNTER — OFFICE VISIT (OUTPATIENT)
Dept: FAMILY MEDICINE CLINIC | Facility: CLINIC | Age: 75
End: 2020-01-24

## 2020-01-24 VITALS
OXYGEN SATURATION: 95 % | WEIGHT: 229 LBS | DIASTOLIC BLOOD PRESSURE: 82 MMHG | SYSTOLIC BLOOD PRESSURE: 134 MMHG | HEART RATE: 56 BPM | BODY MASS INDEX: 34.71 KG/M2 | RESPIRATION RATE: 18 BRPM | HEIGHT: 68 IN | TEMPERATURE: 97.6 F

## 2020-01-24 DIAGNOSIS — I10 ESSENTIAL HYPERTENSION: ICD-10-CM

## 2020-01-24 DIAGNOSIS — E78.5 HYPERLIPIDEMIA, UNSPECIFIED HYPERLIPIDEMIA TYPE: ICD-10-CM

## 2020-01-24 DIAGNOSIS — Z95.1 H/O HEART BYPASS SURGERY: ICD-10-CM

## 2020-01-24 DIAGNOSIS — E11.65 TYPE 2 DIABETES MELLITUS WITH HYPERGLYCEMIA, WITHOUT LONG-TERM CURRENT USE OF INSULIN (HCC): Primary | ICD-10-CM

## 2020-01-24 DIAGNOSIS — R79.89 ELEVATED LFTS: ICD-10-CM

## 2020-01-24 PROCEDURE — 99214 OFFICE O/P EST MOD 30 MIN: CPT | Performed by: FAMILY MEDICINE

## 2020-01-25 LAB
ALBUMIN SERPL-MCNC: 4.2 G/DL (ref 3.5–5.2)
ALBUMIN/GLOB SERPL: 1.8 G/DL
ALP SERPL-CCNC: 81 U/L (ref 39–117)
ALT SERPL-CCNC: 41 U/L (ref 1–41)
AST SERPL-CCNC: 35 U/L (ref 1–40)
BASOPHILS # BLD AUTO: 0.07 10*3/MM3 (ref 0–0.2)
BASOPHILS NFR BLD AUTO: 0.9 % (ref 0–1.5)
BILIRUB SERPL-MCNC: 0.5 MG/DL (ref 0.2–1.2)
BUN SERPL-MCNC: 12 MG/DL (ref 8–23)
BUN/CREAT SERPL: 8.6 (ref 7–25)
CALCIUM SERPL-MCNC: 8.9 MG/DL (ref 8.6–10.5)
CHLORIDE SERPL-SCNC: 102 MMOL/L (ref 98–107)
CHOLEST SERPL-MCNC: 205 MG/DL (ref 0–200)
CO2 SERPL-SCNC: 20.8 MMOL/L (ref 22–29)
CREAT SERPL-MCNC: 1.39 MG/DL (ref 0.76–1.27)
EOSINOPHIL # BLD AUTO: 0.25 10*3/MM3 (ref 0–0.4)
EOSINOPHIL NFR BLD AUTO: 3.2 % (ref 0.3–6.2)
ERYTHROCYTE [DISTWIDTH] IN BLOOD BY AUTOMATED COUNT: 13.3 % (ref 12.3–15.4)
GLOBULIN SER CALC-MCNC: 2.4 GM/DL
GLUCOSE SERPL-MCNC: 256 MG/DL (ref 65–99)
HAV IGM SERPL QL IA: NEGATIVE
HBA1C MFR BLD: 11.5 % (ref 4.8–5.6)
HBV CORE IGM SERPL QL IA: NEGATIVE
HBV SURFACE AG SERPL QL IA: NEGATIVE
HCT VFR BLD AUTO: 42.7 % (ref 37.5–51)
HCV AB S/CO SERPL IA: 0.1 S/CO RATIO (ref 0–0.9)
HDLC SERPL-MCNC: 25 MG/DL (ref 40–60)
HGB BLD-MCNC: 14.2 G/DL (ref 13–17.7)
IMM GRANULOCYTES # BLD AUTO: 0.01 10*3/MM3 (ref 0–0.05)
IMM GRANULOCYTES NFR BLD AUTO: 0.1 % (ref 0–0.5)
LDLC SERPL CALC-MCNC: 135 MG/DL (ref 0–100)
LYMPHOCYTES # BLD AUTO: 2.48 10*3/MM3 (ref 0.7–3.1)
LYMPHOCYTES NFR BLD AUTO: 31.9 % (ref 19.6–45.3)
MCH RBC QN AUTO: 28.7 PG (ref 26.6–33)
MCHC RBC AUTO-ENTMCNC: 33.3 G/DL (ref 31.5–35.7)
MCV RBC AUTO: 86.4 FL (ref 79–97)
MONOCYTES # BLD AUTO: 0.79 10*3/MM3 (ref 0.1–0.9)
MONOCYTES NFR BLD AUTO: 10.2 % (ref 5–12)
NEUTROPHILS # BLD AUTO: 4.17 10*3/MM3 (ref 1.7–7)
NEUTROPHILS NFR BLD AUTO: 53.7 % (ref 42.7–76)
NRBC BLD AUTO-RTO: 0.1 /100 WBC (ref 0–0.2)
PLATELET # BLD AUTO: 226 10*3/MM3 (ref 140–450)
POTASSIUM SERPL-SCNC: 4.9 MMOL/L (ref 3.5–5.2)
PROT SERPL-MCNC: 6.6 G/DL (ref 6–8.5)
RBC # BLD AUTO: 4.94 10*6/MM3 (ref 4.14–5.8)
SODIUM SERPL-SCNC: 138 MMOL/L (ref 136–145)
TRIGL SERPL-MCNC: 227 MG/DL (ref 0–150)
VLDLC SERPL CALC-MCNC: 45.4 MG/DL
WBC # BLD AUTO: 7.77 10*3/MM3 (ref 3.4–10.8)

## 2020-01-29 ENCOUNTER — TELEPHONE (OUTPATIENT)
Dept: FAMILY MEDICINE CLINIC | Facility: CLINIC | Age: 75
End: 2020-01-29

## 2020-01-29 NOTE — TELEPHONE ENCOUNTER
Pharmacy reports insurance will not cover lantus. Basaglar is formulary. Please review for medication change.

## 2020-01-30 RX ORDER — INSULIN GLARGINE 100 [IU]/ML
10 INJECTION, SOLUTION SUBCUTANEOUS NIGHTLY
Qty: 3 PEN | Refills: 0 | Status: SHIPPED | OUTPATIENT
Start: 2020-01-30 | End: 2020-04-06

## 2020-02-24 RX ORDER — INSULIN GLARGINE 100 [IU]/ML
10 INJECTION, SOLUTION SUBCUTANEOUS NIGHTLY
Qty: 9 PEN | Refills: 0 | OUTPATIENT
Start: 2020-02-24

## 2020-02-25 ENCOUNTER — OFFICE VISIT (OUTPATIENT)
Dept: FAMILY MEDICINE CLINIC | Facility: CLINIC | Age: 75
End: 2020-02-25

## 2020-02-25 VITALS
DIASTOLIC BLOOD PRESSURE: 80 MMHG | SYSTOLIC BLOOD PRESSURE: 138 MMHG | RESPIRATION RATE: 18 BRPM | TEMPERATURE: 97.4 F | OXYGEN SATURATION: 97 % | WEIGHT: 233.4 LBS | HEART RATE: 84 BPM | HEIGHT: 68 IN | BODY MASS INDEX: 35.37 KG/M2

## 2020-02-25 DIAGNOSIS — E78.2 MIXED HYPERLIPIDEMIA: ICD-10-CM

## 2020-02-25 DIAGNOSIS — E11.65 UNCONTROLLED TYPE 2 DIABETES MELLITUS WITH HYPERGLYCEMIA (HCC): Primary | ICD-10-CM

## 2020-02-25 DIAGNOSIS — E66.01 CLASS 2 SEVERE OBESITY DUE TO EXCESS CALORIES WITH SERIOUS COMORBIDITY AND BODY MASS INDEX (BMI) OF 35.0 TO 35.9 IN ADULT (HCC): ICD-10-CM

## 2020-02-25 DIAGNOSIS — I10 BENIGN ESSENTIAL HTN: ICD-10-CM

## 2020-02-25 PROCEDURE — 99214 OFFICE O/P EST MOD 30 MIN: CPT | Performed by: FAMILY MEDICINE

## 2020-02-26 LAB
ALBUMIN/CREAT UR: 19 MG/G CREAT (ref 0–29)
CREAT UR-MCNC: 171.5 MG/DL
MICROALBUMIN UR-MCNC: 32.7 UG/ML

## 2020-04-06 RX ORDER — INSULIN GLARGINE 100 [IU]/ML
10 INJECTION, SOLUTION SUBCUTANEOUS NIGHTLY
Qty: 9 PEN | Refills: 0 | Status: SHIPPED | OUTPATIENT
Start: 2020-04-06 | End: 2020-04-17

## 2020-04-06 NOTE — TELEPHONE ENCOUNTER
Patient reports he is almost out of Lantus. Patient reports he will go back to [facility] when he is out of lantus.

## 2020-04-17 ENCOUNTER — TELEMEDICINE (OUTPATIENT)
Dept: FAMILY MEDICINE CLINIC | Facility: CLINIC | Age: 75
End: 2020-04-17

## 2020-04-17 DIAGNOSIS — I10 BENIGN ESSENTIAL HTN: ICD-10-CM

## 2020-04-17 DIAGNOSIS — Z95.1 H/O HEART BYPASS SURGERY: ICD-10-CM

## 2020-04-17 DIAGNOSIS — R94.4 DECREASED GFR: ICD-10-CM

## 2020-04-17 DIAGNOSIS — E66.09 OBESITY DUE TO EXCESS CALORIES WITH SERIOUS COMORBIDITY, UNSPECIFIED CLASSIFICATION: ICD-10-CM

## 2020-04-17 DIAGNOSIS — E78.2 MIXED HYPERLIPIDEMIA: ICD-10-CM

## 2020-04-17 DIAGNOSIS — E11.65 TYPE 2 DIABETES MELLITUS WITH HYPERGLYCEMIA, WITHOUT LONG-TERM CURRENT USE OF INSULIN (HCC): Primary | ICD-10-CM

## 2020-04-17 PROCEDURE — 99214 OFFICE O/P EST MOD 30 MIN: CPT | Performed by: FAMILY MEDICINE

## 2020-04-17 RX ORDER — INSULIN GLARGINE 100 [IU]/ML
30 INJECTION, SOLUTION SUBCUTANEOUS NIGHTLY
Qty: 9 PEN | Refills: 0 | Status: SHIPPED | OUTPATIENT
Start: 2020-04-17 | End: 2020-05-15 | Stop reason: SDUPTHER

## 2020-04-17 NOTE — PROGRESS NOTES
Subjective cc: DM   Cordell Carreon is a 74 y.o. male who presents via video visit for follow up on DM. Patient consents to video visit today.      Pt reports his glucose has been running since increasing to 20 U 180-220, 20 U insulin currently.     Obesity - has not been eating well - no significant changes in weight    No FH of thyroid CA or personal h/o pancreatitis.   He is still taking statin  No chest pain and compliant with his other medications given his h/o CAD - advised this was very important we control his DM   HTN: controlled on current medications       Diabetes   He presents for his follow-up diabetic visit. He has type 2 diabetes mellitus. The initial diagnosis of diabetes was made 8 years ago. His disease course has been improving. There are no hypoglycemic associated symptoms. Pertinent negatives for hypoglycemia include no dizziness, headaches or sweats. Pertinent negatives for diabetes include no blurred vision, no chest pain and no fatigue. There are no hypoglycemic complications. Symptoms are stable. Diabetic complications include heart disease and nephropathy. Risk factors for coronary artery disease include diabetes mellitus, dyslipidemia, obesity, male sex, hypertension and sedentary lifestyle. Current diabetic treatment includes oral agent (dual therapy) and insulin injections (metformin and glipizide, basglar). He is compliant with treatment all of the time. His weight is stable. He is following a generally unhealthy diet. When asked about meal planning, he reported none. His home blood glucose trend is decreasing steadily. His overall blood glucose range is 180-200 mg/dl. An ACE inhibitor/angiotensin II receptor blocker is being taken. He does not see a podiatrist.Eye exam is current (July ).   Coronary Artery Disease   Presents for follow-up visit. Symptoms include weight gain. Pertinent negatives include no chest pain, chest pressure, chest tightness, dizziness, leg swelling, muscle  weakness, palpitations or shortness of breath. The symptoms have been stable. Compliance with diet is poor. Compliance with exercise is poor. Compliance with medications is good.   Hypertension   This is a chronic problem. The current episode started more than 1 year ago. The problem is unchanged. The problem is controlled. Pertinent negatives include no anxiety, blurred vision, chest pain, headaches, neck pain, orthopnea, palpitations, peripheral edema, shortness of breath or sweats. Risk factors for coronary artery disease include diabetes mellitus, dyslipidemia, obesity, male gender and sedentary lifestyle. Current antihypertension treatment includes calcium channel blockers, ACE inhibitors and beta blockers. The current treatment provides moderate improvement. Compliance problems include diet and exercise.  Hypertensive end-organ damage includes CAD/MI. Identifiable causes of hypertension include a hypertension causing med.        The following portions of the patient's history were reviewed and updated as appropriate: allergies, current medications, past family history, past medical history, past social history, past surgical history and problem list.        Review of Systems   Constitutional: Positive for weight gain. Negative for activity change, appetite change, chills, fatigue and fever.   Eyes: Negative for blurred vision.   Respiratory: Negative for cough, chest tightness and shortness of breath.    Cardiovascular: Negative for chest pain, palpitations, orthopnea and leg swelling.   Gastrointestinal: Negative for abdominal pain, constipation, diarrhea, nausea and vomiting.   Musculoskeletal: Negative for muscle weakness and neck pain.   Neurological: Negative for dizziness and headaches.   All other systems reviewed and are negative.      Objective   There were no vitals taken for this visit.  Physical Exam    Assessment/Plan   Problems Addressed this Visit        Cardiovascular and Mediastinum    Benign  essential HTN    Hyperlipidemia       Endocrine    Type 2 diabetes mellitus with hyperglycemia, without long-term current use of insulin (CMS/[facility]) - Primary       Other    H/O heart bypass surgery      Other Visit Diagnoses     Obesity due to excess calories with serious comorbidity, unspecified classification              PLAN:     #1 DM: chronic, uncontrolled with hyperglycemia, labs reviewed, discussed adjusting dose of insulin - increase to 30U QHS, conitnue on current medications, recheck in 3 months, eye exam UTD, have labs done     #2 HTN: chronic, controlled, continue on current medication     #3 HLD: chronic, uncontrolled, on statin, advised on lifestyle changes     #4 obesity: Patient's There is no height or weight on file to calculate BMI. BMI is above normal parameters. Recommendations include: educational material, exercise counseling and nutrition counseling.     20 minutes           This document has been electronically signed by Pallavi Mtz MD on April 17, 2020 14:39

## 2020-04-17 NOTE — PATIENT INSTRUCTIONS
"Hypertension, Adult  High blood pressure (hypertension) is when the force of blood pumping through the arteries is too strong. The arteries are the blood vessels that carry blood from the heart throughout the body. Hypertension forces the heart to work harder to pump blood and may cause arteries to become narrow or stiff. Untreated or uncontrolled hypertension can cause a heart attack, heart failure, a stroke, kidney disease, and other problems.  A blood pressure reading consists of a higher number over a lower number. Ideally, your blood pressure should be below 120/80. The first (\"top\") number is called the systolic pressure. It is a measure of the pressure in your arteries as your heart beats. The second (\"bottom\") number is called the diastolic pressure. It is a measure of the pressure in your arteries as the heart relaxes.  What are the causes?  The exact cause of this condition is not known. There are some conditions that result in or are related to high blood pressure.  What increases the risk?  Some risk factors for high blood pressure are under your control. The following factors may make you more likely to develop this condition:  · Smoking.  · Having type 2 diabetes mellitus, high cholesterol, or both.  · Not getting enough exercise or physical activity.  · Being overweight.  · Having too much fat, sugar, calories, or salt (sodium) in your diet.  · Drinking too much alcohol.  Some risk factors for high blood pressure may be difficult or impossible to change. Some of these factors include:  · Having chronic kidney disease.  · Having a family history of high blood pressure.  · Age. Risk increases with age.  · Race. You may be at higher risk if you are .  · Gender. Men are at higher risk than women before age 45. After age 65, women are at higher risk than men.  · Having obstructive sleep apnea.  · Stress.  What are the signs or symptoms?  High blood pressure may not cause symptoms. Very high " blood pressure (hypertensive crisis) may cause:  · Headache.  · Anxiety.  · Shortness of breath.  · Nosebleed.  · Nausea and vomiting.  · Vision changes.  · Severe chest pain.  · Seizures.  How is this diagnosed?  This condition is diagnosed by measuring your blood pressure while you are seated, with your arm resting on a flat surface, your legs uncrossed, and your feet flat on the floor. The cuff of the blood pressure monitor will be placed directly against the skin of your upper arm at the level of your heart. It should be measured at least twice using the same arm. Certain conditions can cause a difference in blood pressure between your right and left arms.  Certain factors can cause blood pressure readings to be lower or higher than normal for a short period of time:  · When your blood pressure is higher when you are in a health care provider's office than when you are at home, this is called white coat hypertension. Most people with this condition do not need medicines.  · When your blood pressure is higher at home than when you are in a health care provider's office, this is called masked hypertension. Most people with this condition may need medicines to control blood pressure.  If you have a high blood pressure reading during one visit or you have normal blood pressure with other risk factors, you may be asked to:  · Return on a different day to have your blood pressure checked again.  · Monitor your blood pressure at home for 1 week or longer.  If you are diagnosed with hypertension, you may have other blood or imaging tests to help your health care provider understand your overall risk for other conditions.  How is this treated?  This condition is treated by making healthy lifestyle changes, such as eating healthy foods, exercising more, and reducing your alcohol intake. Your health care provider may prescribe medicine if lifestyle changes are not enough to get your blood pressure under control, and  if:  · Your systolic blood pressure is above 130.  · Your diastolic blood pressure is above 80.  Your personal target blood pressure may vary depending on your medical conditions, your age, and other factors.  Follow these instructions at home:  Eating and drinking    · Eat a diet that is high in fiber and potassium, and low in sodium, added sugar, and fat. An example eating plan is called the DASH (Dietary Approaches to Stop Hypertension) diet. To eat this way:  ? Eat plenty of fresh fruits and vegetables. Try to fill one half of your plate at each meal with fruits and vegetables.  ? Eat whole grains, such as whole-wheat pasta, brown rice, or whole-grain bread. Fill about one fourth of your plate with whole grains.  ? Eat or drink low-fat dairy products, such as skim milk or low-fat yogurt.  ? Avoid fatty cuts of meat, processed or cured meats, and poultry with skin. Fill about one fourth of your plate with lean proteins, such as fish, chicken without skin, beans, eggs, or tofu.  ? Avoid pre-made and processed foods. These tend to be higher in sodium, added sugar, and fat.  · Reduce your daily sodium intake. Most people with hypertension should eat less than 1,500 mg of sodium a day.  · Do not drink alcohol if:  ? Your health care provider tells you not to drink.  ? You are pregnant, may be pregnant, or are planning to become pregnant.  · If you drink alcohol:  ? Limit how much you use to:  § 0-1 drink a day for women.  § 0-2 drinks a day for men.  ? Be aware of how much alcohol is in your drink. In the [place] one drink equals one 12 oz bottle of beer (355 mL), one 5 oz glass of wine (148 mL), or one 1½ oz glass of hard liquor (44 mL).  Lifestyle    · Work with your health care provider to maintain a healthy body weight or to lose weight. Ask what an ideal weight is for you.  · Get at least 30 minutes of exercise most days of the week. Activities may include walking, swimming, or biking.  · Include exercise to  strengthen your muscles (resistance exercise), such as Pilates or lifting weights, as part of your weekly exercise routine. Try to do these types of exercises for 30 minutes at least 3 days a week.  · Do not use any products that contain nicotine or tobacco, such as cigarettes, e-cigarettes, and chewing tobacco. If you need help quitting, ask your health care provider.  · Monitor your blood pressure at home as told by your health care provider.  · Keep all follow-up visits as told by your health care provider. This is important.  Medicines  · Take over-the-counter and prescription medicines only as told by your health care provider. Follow directions carefully. Blood pressure medicines must be taken as prescribed.  · Do not skip doses of blood pressure medicine. Doing this puts you at risk for problems and can make the medicine less effective.  · Ask your health care provider about side effects or reactions to medicines that you should watch for.  Contact a health care provider if you:  · Think you are having a reaction to a medicine you are taking.  · Have headaches that keep coming back (recurring).  · Feel dizzy.  · Have swelling in your ankles.  · Have trouble with your vision.  Get help right away if you:  · Develop a severe headache or confusion.  · Have unusual weakness or numbness.  · Feel faint.  · Have severe pain in your chest or abdomen.  · Vomit repeatedly.  · Have trouble breathing.  Summary  · Hypertension is when the force of blood pumping through your arteries is too strong. If this condition is not controlled, it may put you at risk for serious complications.  · Your personal target blood pressure may vary depending on your medical conditions, your age, and other factors. For most people, a normal blood pressure is less than 120/80.  · Hypertension is treated with lifestyle changes, medicines, or a combination of both. Lifestyle changes include losing weight, eating a healthy, low-sodium diet,  "exercising more, and limiting alcohol.  This information is not intended to replace advice given to you by your health care provider. Make sure you discuss any questions you have with your health care provider.  Document Released: 12/18/2006 Document Revised: 08/28/2019 Document Reviewed: 08/28/2019  [employer] Interactive Patient Education © 2020 [employer]      DASH Eating Plan  DASH stands for \"Dietary Approaches to Stop Hypertension.\" The DASH eating plan is a healthy eating plan that has been shown to reduce high blood pressure (hypertension). It may also reduce your risk for type 2 diabetes, heart disease, and stroke. The DASH eating plan may also help with weight loss.  What are tips for following this plan?    General guidelines  · Avoid eating more than 2,300 mg (milligrams) of salt (sodium) a day. If you have hypertension, you may need to reduce your sodium intake to 1,500 mg a day.  · Limit alcohol intake to no more than 1 drink a day for nonpregnant women and 2 drinks a day for men. One drink equals 12 oz of beer, 5 oz of wine, or 1½ oz of hard liquor.  · Work with your health care provider to maintain a healthy body weight or to lose weight. Ask what an ideal weight is for you.  · Get at least 30 minutes of exercise that causes your heart to beat faster (aerobic exercise) most days of the week. Activities may include walking, swimming, or biking.  · Work with your health care provider or diet and nutrition specialist (dietitian) to adjust your eating plan to your individual calorie needs.  Reading food labels    · Check food labels for the amount of sodium per serving. Choose foods with less than 5 percent of the Daily Value of sodium. Generally, foods with less than 300 mg of sodium per serving fit into this eating plan.  · To find whole grains, look for the word \"whole\" as the first word in the ingredient list.  Shopping  · Buy products labeled as \"low-sodium\" or \"no salt added.\"  · Buy fresh foods. " Avoid canned foods and premade or frozen meals.  Cooking  · Avoid adding salt when cooking. Use salt-free seasonings or herbs instead of table salt or sea salt. Check with your health care provider or pharmacist before using salt substitutes.  · Do not marcum foods. Cook foods using healthy methods such as baking, boiling, grilling, and broiling instead.  · Cook with heart-healthy oils, such as olive, canola, soybean, or sunflower oil.  Meal planning  · Eat a balanced diet that includes:  ? 5 or more servings of fruits and vegetables each day. At each meal, try to fill half of your plate with fruits and vegetables.  ? Up to 6-8 servings of whole grains each day.  ? Less than 6 oz of lean meat, poultry, or fish each day. A 3-oz serving of meat is about the same size as a deck of cards. One egg equals 1 oz.  ? 2 servings of low-fat dairy each day.  ? A serving of nuts, seeds, or beans 5 times each week.  ? Heart-healthy fats. Healthy fats called Omega-3 fatty acids are found in foods such as flaxseeds and coldwater fish, like sardines, salmon, and mackerel.  · Limit how much you eat of the following:  ? Canned or prepackaged foods.  ? Food that is high in trans fat, such as fried foods.  ? Food that is high in saturated fat, such as fatty meat.  ? Sweets, desserts, sugary drinks, and other foods with added sugar.  ? Full-fat dairy products.  · Do not salt foods before eating.  · Try to eat at least 2 vegetarian meals each week.  · Eat more home-cooked food and less restaurant, buffet, and fast food.  · When eating at a restaurant, ask that your food be prepared with less salt or no salt, if possible.  What foods are recommended?  The items listed may not be a complete list. Talk with your dietitian about what dietary choices are best for you.  Grains  Whole-grain or whole-wheat bread. Whole-grain or whole-wheat pasta. Brown rice. Oatmeal. Quinoa. Bulgur. Whole-grain and low-sodium cereals. Regine bread. Low-fat, low-sodium  crackers. Whole-wheat flour tortillas.  Vegetables  Fresh or frozen vegetables (raw, steamed, roasted, or grilled). Low-sodium or reduced-sodium tomato and vegetable juice. Low-sodium or reduced-sodium tomato sauce and tomato paste. Low-sodium or reduced-sodium canned vegetables.  Fruits  All fresh, dried, or frozen fruit. Canned fruit in natural juice (without added sugar).  Meat and other protein foods  Skinless chicken or turkey. Ground chicken or turkey. Pork with fat trimmed off. Fish and seafood. Egg whites. Dried beans, peas, or lentils. Unsalted nuts, nut butters, and seeds. Unsalted canned beans. Lean cuts of beef with fat trimmed off. Low-sodium, lean deli meat.  Dairy  Low-fat (1%) or fat-free (skim) milk. Fat-free, low-fat, or reduced-fat cheeses. Nonfat, low-sodium ricotta or cottage cheese. Low-fat or nonfat yogurt. Low-fat, low-sodium cheese.  Fats and oils  Soft margarine without trans fats. Vegetable oil. Low-fat, reduced-fat, or light mayonnaise and salad dressings (reduced-sodium). Canola, safflower, olive, soybean, and sunflower oils. Avocado.  Seasoning and other foods  Herbs. Spices. Seasoning mixes without salt. Unsalted popcorn and pretzels. Fat-free sweets.  What foods are not recommended?  The items listed may not be a complete list. Talk with your dietitian about what dietary choices are best for you.  Grains  Baked goods made with fat, such as croissants, muffins, or some breads. Dry pasta or rice meal packs.  Vegetables  Creamed or fried vegetables. Vegetables in a cheese sauce. Regular canned vegetables (not low-sodium or reduced-sodium). Regular canned tomato sauce and paste (not low-sodium or reduced-sodium). Regular tomato and vegetable juice (not low-sodium or reduced-sodium). Pickles. Olives.  Fruits  Canned fruit in a light or heavy syrup. Fried fruit. Fruit in cream or butter sauce.  Meat and other protein foods  Fatty cuts of meat. Ribs. Fried meat. [place]. Sausage. Bologna and  other processed lunch meats. Salami. Fatback. Hotdogs. Bratwurst. Salted nuts and seeds. Canned beans with added salt. Canned or smoked fish. Whole eggs or egg yolks. Chicken or turkey with skin.  Dairy  Whole or 2% milk, cream, and half-and-half. Whole or full-fat cream cheese. Whole-fat or sweetened yogurt. Full-fat cheese. Nondairy creamers. Whipped toppings. Processed cheese and cheese spreads.  Fats and oils  Butter. Stick margarine. Lard. Shortening. Ghee. [place] fat. Tropical oils, such as coconut, palm kernel, or palm oil.  Seasoning and other foods  Salted popcorn and pretzels. Onion salt, garlic salt, seasoned salt, table salt, and sea salt. Worcestershire sauce. Tartar sauce. Barbecue sauce. Teriyaki sauce. Soy sauce, including reduced-sodium. Steak sauce. Canned and packaged gravies. Fish sauce. Oyster sauce. Cocktail sauce. Horseradish that you find on the shelf. Ketchup. Mustard. Meat flavorings and tenderizers. Bouillon cubes. Hot sauce and Tabasco sauce. Premade or packaged marinades. Premade or packaged taco seasonings. Relishes. Regular salad dressings.  Where to find more information:  · National Heart, Lung, and Blood [place]: www.nhlbi.nih.gov  · American Heart Association: www.heart.org  Summary  · The DASH eating plan is a healthy eating plan that has been shown to reduce high blood pressure (hypertension). It may also reduce your risk for type 2 diabetes, heart disease, and stroke.  · With the DASH eating plan, you should limit salt (sodium) intake to 2,300 mg a day. If you have hypertension, you may need to reduce your sodium intake to 1,500 mg a day.  · When on the DASH eating plan, aim to eat more fresh fruits and vegetables, whole grains, lean proteins, low-fat dairy, and heart-healthy fats.  · Work with your health care provider or diet and nutrition specialist (dietitian) to adjust your eating plan to your individual calorie needs.  This information is not intended to replace advice  given to you by your health care provider. Make sure you discuss any questions you have with your health care provider.  Document Released: 12/06/2012 Document Revised: 12/11/2017 Document Reviewed: 12/11/2017  [employer] Interactive Patient Education © 2020 [employer]      Obesity, Adult  Obesity is the condition of having too much total body fat. Being overweight or obese means that your weight is greater than what is considered healthy for your body size. Obesity is determined by a measurement called BMI. BMI is an estimate of body fat and is calculated from height and weight. For adults, a BMI of 30 or higher is considered obese.  Obesity can lead to other health concerns and major illnesses, including:  · Stroke.  · Coronary artery disease (CAD).  · Type 2 diabetes.  · Some types of cancer, including cancers of the colon, breast, uterus, and gallbladder.  · Osteoarthritis.  · High blood pressure (hypertension).  · High cholesterol.  · Sleep apnea.  · Gallbladder stones.  · Infertility problems.  What are the causes?  Common causes of this condition include:  · Eating daily meals that are high in calories, sugar, and fat.  · Being born with genes that may make you more likely to become obese.  · Having a medical condition that causes obesity, including:  ? Hypothyroidism.  ? Polycystic ovarian syndrome (PCOS).  ? Binge-eating disorder.  ? Cushing syndrome.  · Taking certain medicines, such as steroids, antidepressants, and seizure medicines.  · Not being physically active (sedentary lifestyle).  · Not getting enough sleep.  · Drinking high amounts of sugar-sweetened beverages, such as soft drinks.  What increases the risk?  The following factors may make you more likely to develop this condition:  · Having a family history of obesity.  · Being a woman of  descent.  · Being a man of  descent.  · Living in an area with limited access to:  ? [place], recreation centers, or  sidewalks.  ? Healthy food choices, such as grocery stores and farmers' markets.  What are the signs or symptoms?  The main sign of this condition is having too much body fat.  How is this diagnosed?  This condition is diagnosed based on:  · Your BMI. If you are an adult with a BMI of 30 or higher, you are considered obese.  · Your waist circumference. This measures the distance around your waistline.  · Your skinfold thickness. Your health care provider may gently pinch a fold of your skin and measure it.  You may have other tests to check for underlying conditions.  How is this treated?  Treatment for this condition often includes changing your lifestyle. Treatment may include some or all of the following:  · Dietary changes. This may include developing a healthy meal plan.  · Regular physical activity. This may include activity that causes your heart to beat faster (aerobic exercise) and strength training. Work with your health care provider to design an exercise program that works for you.  · Medicine to help you lose weight if you are unable to lose 1 pound a week after 6 weeks of healthy eating and more physical activity.  · Treating conditions that cause the obesity (underlying conditions).  · Surgery. Surgical options may include gastric banding and gastric bypass. Surgery may be done if:  ? Other treatments have not helped to improve your condition.  ? You have a BMI of 40 or higher.  ? You have life-threatening health problems related to obesity.  Follow these instructions at home:  Eating and drinking    · Follow recommendations from your health care provider about what you eat and drink. Your health care provider may advise you to:  ? Limit fast food, sweets, and processed snack foods.  ? Choose low-fat options, such as low-fat milk instead of whole milk.  ? Eat 5 or more servings of fruits or vegetables every day.  ? Eat at home more often. This gives you more control over what you eat.  ? Choose  healthy foods when you eat out.  ? Learn to read food labels. This will help you understand how much food is considered 1 serving.  ? Learn what a healthy serving size is.  ? Keep low-fat snacks available.  ? Limit sugary drinks, such as soda, fruit juice, sweetened iced tea, and flavored milk.  · Drink enough water to keep your urine pale yellow.  · Do not follow a fad diet. Fad diets can be unhealthy and even dangerous.  Physical activity  · Exercise regularly, as told by your health care provider.  ? Most adults should get up to 150 minutes of moderate-intensity exercise every week.  ? Ask your health care provider what types of exercise are safe for you and how often you should exercise.  · Warm up and stretch before being active.  · Cool down and stretch after being active.  · Rest between periods of activity.  Lifestyle  · Work with your health care provider and a dietitian to set a weight-loss goal that is healthy and reasonable for you.  · Limit your screen time.  · Find ways to reward yourself that do not involve food.  · Do not drink alcohol if:  ? Your health care provider tells you not to drink.  ? You are pregnant, may be pregnant, or are planning to become pregnant.  · If you drink alcohol:  ? Limit how much you use to:  § 0-1 drink a day for women.  § 0-2 drinks a day for men.  ? Be aware of how much alcohol is in your drink. In the [place] one drink equals one 12 oz bottle of beer (355 mL), one 5 oz glass of wine (148 mL), or one 1½ oz glass of hard liquor (44 mL).  General instructions  · Keep a weight-loss journal to keep track of the food you eat and how much exercise you get.  · Take over-the-counter and prescription medicines only as told by your health care provider.  · Take vitamins and supplements only as told by your health care provider.  · Consider joining a support group. Your health care provider may be able to recommend a support group.  · Keep all follow-up visits as told by your health  care provider. This is important.  Contact a health care provider if:  · You are unable to meet your weight loss goal after 6 weeks of dietary and lifestyle changes.  Get help right away if you are having:  · Trouble breathing.  · Suicidal thoughts or behaviors.  Summary  · Obesity is the condition of having too much total body fat.  · Being overweight or obese means that your weight is greater than what is considered healthy for your body size.  · Work with your health care provider and a dietitian to set a weight-loss goal that is healthy and reasonable for you.  · Exercise regularly, as told by your health care provider. Ask your health care provider what types of exercise are safe for you and how often you should exercise.  This information is not intended to replace advice given to you by your health care provider. Make sure you discuss any questions you have with your health care provider.  Document Released: 01/25/2006 Document Revised: 08/22/2019 Document Reviewed: 08/22/2019  [employer] Interactive Patient Education © 2020 [employer]      Type 2 Diabetes Mellitus, Diagnosis, Adult  Type 2 diabetes (type 2 diabetes mellitus) is a long-term (chronic) disease. In type 2 diabetes, one or both of these problems may be present:  · The pancreas does not make enough of a hormone called insulin.  · Cells in the body do not respond properly to insulin that the body makes (insulin resistance).  Normally, insulin allows blood sugar (glucose) to enter cells in the body. The cells use glucose for energy. Insulin resistance or lack of insulin causes excess glucose to build up in the blood instead of going into cells. As a result, high blood glucose (hyperglycemia) develops.  What increases the risk?  The following factors may make you more likely to develop type 2 diabetes:  · Having a family member with type 2 diabetes.  · Being overweight or obese.  · Having an inactive (sedentary) lifestyle.  · Having been diagnosed  with insulin resistance.  · Having a history of prediabetes, gestational diabetes, or polycystic ovary syndrome (PCOS).  · Being of American-, -American, /, or / descent.  What are the signs or symptoms?  In the early stage of this condition, you may not have symptoms. Symptoms develop slowly and may include:  · Increased thirst (polydipsia).  · Increased hunger (polyphagia).  · Increased urination (polyuria).  · Increased urination during the night (nocturia).  · Unexplained weight loss.  · Frequent infections that keep coming back (recurring).  · Fatigue.  · Weakness.  · Vision changes, such as blurry vision.  · Cuts or bruises that are slow to heal.  · Tingling or numbness in the hands or feet.  · Dark patches on the skin (acanthosis nigricans).  How is this diagnosed?  This condition is diagnosed based on your symptoms, your medical history, a physical exam, and your blood glucose level. Your blood glucose may be checked with one or more of the following blood tests:  · A fasting blood glucose (FBG) test. You will not be allowed to eat (you will fast) for 8 hours or longer before a blood sample is taken.  · A random blood glucose test. This test checks blood glucose at any time of day regardless of when you ate.  · An A1c (hemoglobin A1c) blood test. This test provides information about blood glucose control over the previous 2-3 months.  · An oral glucose tolerance test (OGTT). This test measures your blood glucose at two times:  ? After fasting. This is your baseline blood glucose level.  ? Two hours after drinking a beverage that contains glucose.  You may be diagnosed with type 2 diabetes if:  · Your FBG level is 126 mg/dL (7.0 mmol/L) or higher.  · Your random blood glucose level is 200 mg/dL (11.1 mmol/L) or higher.  · Your A1c level is 6.5% or higher.  · Your OGTT result is higher than 200 mg/dL (11.1 mmol/L).  These blood tests may be repeated to confirm  your diagnosis.  How is this treated?  Your treatment may be managed by a specialist called an endocrinologist. Type 2 diabetes may be treated by following instructions from your health care provider about:  · Making diet and lifestyle changes. This may include:  ? Following an individualized nutrition plan that is developed by a diet and nutrition specialist (registered dietitian).  ? Exercising regularly.  ? Finding ways to manage stress.  · Checking your blood glucose level as often as told.  · Taking diabetes medicines or insulin daily. This helps to keep your blood glucose levels in the healthy range.  ? If you use insulin, you may need to adjust the dosage depending on how physically active you are and what foods you eat. Your health care provider will tell you how to adjust your dosage.  · Taking medicines to help prevent complications from diabetes, such as:  ? Aspirin.  ? Medicine to lower cholesterol.  ? Medicine to control blood pressure.  Your health care provider will set individualized treatment goals for you. Your goals will be based on your age, other medical conditions you have, and how you respond to diabetes treatment. Generally, the goal of treatment is to maintain the following blood glucose levels:  · Before meals (preprandial):  mg/dL (4.4-7.2 mmol/L).  · After meals (postprandial): below 180 mg/dL (10 mmol/L).  · A1c level: less than 7%.  Follow these instructions at home:  Questions to ask your health care provider  · Consider asking the following questions:  ? Do I need to meet with a diabetes educator?  ? Where can I find a support group for people with diabetes?  ? What equipment will I need to manage my diabetes at home?  ? What diabetes medicines do I need, and when should I take them?  ? How often do I need to check my blood glucose?  ? What number can I call if I have questions?  ? When is my next appointment?  General instructions  · Take over-the-counter and prescription  medicines only as told by your health care provider.  · Keep all follow-up visits as told by your health care provider. This is important.  · For more information about diabetes, visit:  ? American Diabetes Association (ADA): www.diabetes.org  ? American Association of Diabetes Educators (AADE): www.diabeteseducator.org  Contact a health care provider if:  · Your blood glucose is at or above 240 mg/dL (13.3 mmol/L) for 2 days in a row.  · You have been sick or have had a fever for 2 days or longer, and you are not getting better.  · You have any of the following problems for more than 6 hours:  ? You cannot eat or drink.  ? You have nausea and vomiting.  ? You have diarrhea.  Get help right away if:  · Your blood glucose is lower than 54 mg/dL (3.0 mmol/L).  · You become confused or you have trouble thinking clearly.  · You have difficulty breathing.  · You have moderate or large ketone levels in your urine.  Summary  · Type 2 diabetes (type 2 diabetes mellitus) is a long-term (chronic) disease. In type 2 diabetes, the pancreas does not make enough of a hormone called insulin, or cells in the body do not respond properly to insulin that the body makes (insulin resistance).  · This condition is treated by making diet and lifestyle changes and taking diabetes medicines or insulin.  · Your health care provider will set individualized treatment goals for you. Your goals will be based on your age, other medical conditions you have, and how you respond to diabetes treatment.  · Keep all follow-up visits as told by your health care provider. This is important.  This information is not intended to replace advice given to you by your health care provider. Make sure you discuss any questions you have with your health care provider.  Document Released: 12/18/2006 Document Revised: 07/19/2018 Document Reviewed: 01/20/2017  [email or web address] Interactive Patient Education © 2020 [employer]

## 2020-04-28 LAB
ALBUMIN SERPL-MCNC: 4.2 G/DL (ref 3.5–5.2)
ALBUMIN/GLOB SERPL: 1.6 G/DL
ALP SERPL-CCNC: 73 U/L (ref 39–117)
ALT SERPL-CCNC: 40 U/L (ref 1–41)
AST SERPL-CCNC: 27 U/L (ref 1–40)
BILIRUB SERPL-MCNC: 0.6 MG/DL (ref 0.2–1.2)
BUN SERPL-MCNC: 17 MG/DL (ref 8–23)
BUN/CREAT SERPL: 13.3 (ref 7–25)
CALCIUM SERPL-MCNC: 9.2 MG/DL (ref 8.6–10.5)
CHLORIDE SERPL-SCNC: 100 MMOL/L (ref 98–107)
CO2 SERPL-SCNC: 24.2 MMOL/L (ref 22–29)
CREAT SERPL-MCNC: 1.28 MG/DL (ref 0.76–1.27)
GLOBULIN SER CALC-MCNC: 2.7 GM/DL
GLUCOSE SERPL-MCNC: 203 MG/DL (ref 65–99)
HBA1C MFR BLD: 6.8 % (ref 4.8–5.6)
POTASSIUM SERPL-SCNC: 4.8 MMOL/L (ref 3.5–5.2)
PROT SERPL-MCNC: 6.9 G/DL (ref 6–8.5)
SODIUM SERPL-SCNC: 134 MMOL/L (ref 136–145)

## 2020-05-15 ENCOUNTER — TELEMEDICINE (OUTPATIENT)
Dept: FAMILY MEDICINE CLINIC | Facility: CLINIC | Age: 75
End: 2020-05-15

## 2020-05-15 DIAGNOSIS — E78.2 MIXED HYPERLIPIDEMIA: ICD-10-CM

## 2020-05-15 DIAGNOSIS — E66.09 OBESITY DUE TO EXCESS CALORIES WITH SERIOUS COMORBIDITY, UNSPECIFIED CLASSIFICATION: ICD-10-CM

## 2020-05-15 DIAGNOSIS — Z95.1 H/O HEART BYPASS SURGERY: ICD-10-CM

## 2020-05-15 DIAGNOSIS — E11.65 TYPE 2 DIABETES MELLITUS WITH HYPERGLYCEMIA, WITHOUT LONG-TERM CURRENT USE OF INSULIN (HCC): Primary | ICD-10-CM

## 2020-05-15 DIAGNOSIS — I10 BENIGN ESSENTIAL HTN: ICD-10-CM

## 2020-05-15 PROCEDURE — 99214 OFFICE O/P EST MOD 30 MIN: CPT | Performed by: FAMILY MEDICINE

## 2020-05-15 RX ORDER — INSULIN GLARGINE 100 [IU]/ML
30 INJECTION, SOLUTION SUBCUTANEOUS NIGHTLY
Qty: 9 PEN | Refills: 1 | Status: SHIPPED | OUTPATIENT
Start: 2020-05-15 | End: 2020-08-03

## 2020-05-15 NOTE — PROGRESS NOTES
Subjective cc: DM   Cordell Carreon is a 74 y.o. male who presents via video visit for follow up on DM. Patient consents to video visit today.      Pt reports his glucose has been running in low 200s. He has seen 1 reading in the 300s. His most recent ha1c was much improved to 6.8. He had been using 30U QHS but didn't notice uh difference so went back to using 20U. He doesn't mind to increase again.   BP has been well controlled.   No new concerns.     Obesity - no significant changes in weight    No FH of thyroid CA or personal h/o pancreatitis.   He is still taking statin  No chest pain and compliant with his other medications given his h/o CAD - advised this was very important we control his       Diabetes   He presents for his follow-up diabetic visit. He has type 2 diabetes mellitus. The initial diagnosis of diabetes was made 8 years ago. His disease course has been improving. There are no hypoglycemic associated symptoms. Pertinent negatives for hypoglycemia include no dizziness, headaches or sweats. Pertinent negatives for diabetes include no blurred vision, no chest pain and no fatigue. There are no hypoglycemic complications. Symptoms are stable. Diabetic complications include heart disease and nephropathy. Risk factors for coronary artery disease include diabetes mellitus, dyslipidemia, obesity, male sex, hypertension and sedentary lifestyle. Current diabetic treatment includes oral agent (dual therapy) and insulin injections (metformin and glipizide, basglar). He is compliant with treatment all of the time. His weight is stable. He is following a generally unhealthy diet. When asked about meal planning, he reported none. His home blood glucose trend is decreasing steadily. His overall blood glucose range is 180-200 mg/dl. An ACE inhibitor/angiotensin II receptor blocker is being taken. He does not see a podiatrist.Eye exam is current (July ).   Coronary Artery Disease   Presents for follow-up visit.  Symptoms include weight gain. Pertinent negatives include no chest pain, chest pressure, chest tightness, dizziness, leg swelling, muscle weakness, palpitations or shortness of breath. The symptoms have been stable. Compliance with diet is poor. Compliance with exercise is poor. Compliance with medications is good.   Hypertension   This is a chronic problem. The current episode started more than 1 year ago. The problem is unchanged. The problem is controlled. Pertinent negatives include no anxiety, blurred vision, chest pain, headaches, neck pain, orthopnea, palpitations, peripheral edema, shortness of breath or sweats. Risk factors for coronary artery disease include diabetes mellitus, dyslipidemia, obesity, male gender and sedentary lifestyle. Current antihypertension treatment includes calcium channel blockers, ACE inhibitors and beta blockers. The current treatment provides moderate improvement. Compliance problems include diet and exercise.  Hypertensive end-organ damage includes CAD/MI. Identifiable causes of hypertension include a hypertension causing med.        The following portions of the patient's history were reviewed and updated as appropriate: allergies, current medications, past family history, past medical history, past social history, past surgical history and problem list.        Review of Systems   Constitutional: Positive for weight gain. Negative for activity change, appetite change, chills, fatigue and fever.   Eyes: Negative for blurred vision.   Respiratory: Negative for cough, chest tightness and shortness of breath.    Cardiovascular: Negative for chest pain, palpitations, orthopnea and leg swelling.   Gastrointestinal: Negative for abdominal pain, constipation, diarrhea, nausea and vomiting.   Musculoskeletal: Negative for muscle weakness and neck pain.   Neurological: Negative for dizziness and headaches.   All other systems reviewed and are negative.      Objective   There were no  vitals taken for this visit.  Physical Exam    Assessment/Plan   Problems Addressed this Visit        Cardiovascular and Mediastinum    Benign essential HTN    Relevant Orders    Comprehensive Metabolic Panel    Hyperlipidemia       Endocrine    Type 2 diabetes mellitus with hyperglycemia, without long-term current use of insulin (CMS/[facility]) - Primary    Relevant Medications    Insulin Glargine (BASAGLAR KWIKPEN) 100 UNIT/ML injection pen    Other Relevant Orders    Comprehensive Metabolic Panel    Hemoglobin A1c    CBC (No Diff)       Other    H/O heart bypass surgery      Other Visit Diagnoses     Obesity due to excess calories with serious comorbidity, unspecified classification              PLAN:     #1 DM: chronic, controlled with occasional hyperglycemia, labs reviewed - ha1c improved, discussed adjusting dose of insulin - increase to 30U QHS, continue on current medications, recheck in 3 months, eye exam UTD, have labs done prior to appt    #2 HTN: chronic, controlled, continue on current medication     #3 HLD: chronic, uncontrolled, on statin, advised on lifestyle changes     #4 obesity: Patient's There is no height or weight on file to calculate BMI. BMI is above normal parameters. Recommendations include: educational material, exercise counseling and nutrition counseling.     15 minutes           This document has been electronically signed by Pallavi Mtz MD on May 15, 2020 16:58

## 2020-05-15 NOTE — PATIENT INSTRUCTIONS
"Hypertension, Adult  High blood pressure (hypertension) is when the force of blood pumping through the arteries is too strong. The arteries are the blood vessels that carry blood from the heart throughout the body. Hypertension forces the heart to work harder to pump blood and may cause arteries to become narrow or stiff. Untreated or uncontrolled hypertension can cause a heart attack, heart failure, a stroke, kidney disease, and other problems.  A blood pressure reading consists of a higher number over a lower number. Ideally, your blood pressure should be below 120/80. The first (\"top\") number is called the systolic pressure. It is a measure of the pressure in your arteries as your heart beats. The second (\"bottom\") number is called the diastolic pressure. It is a measure of the pressure in your arteries as the heart relaxes.  What are the causes?  The exact cause of this condition is not known. There are some conditions that result in or are related to high blood pressure.  What increases the risk?  Some risk factors for high blood pressure are under your control. The following factors may make you more likely to develop this condition:  · Smoking.  · Having type 2 diabetes mellitus, high cholesterol, or both.  · Not getting enough exercise or physical activity.  · Being overweight.  · Having too much fat, sugar, calories, or salt (sodium) in your diet.  · Drinking too much alcohol.  Some risk factors for high blood pressure may be difficult or impossible to change. Some of these factors include:  · Having chronic kidney disease.  · Having a family history of high blood pressure.  · Age. Risk increases with age.  · Race. You may be at higher risk if you are .  · Gender. Men are at higher risk than women before age 45. After age 65, women are at higher risk than men.  · Having obstructive sleep apnea.  · Stress.  What are the signs or symptoms?  High blood pressure may not cause symptoms. Very high " blood pressure (hypertensive crisis) may cause:  · Headache.  · Anxiety.  · Shortness of breath.  · Nosebleed.  · Nausea and vomiting.  · Vision changes.  · Severe chest pain.  · Seizures.  How is this diagnosed?  This condition is diagnosed by measuring your blood pressure while you are seated, with your arm resting on a flat surface, your legs uncrossed, and your feet flat on the floor. The cuff of the blood pressure monitor will be placed directly against the skin of your upper arm at the level of your heart. It should be measured at least twice using the same arm. Certain conditions can cause a difference in blood pressure between your right and left arms.  Certain factors can cause blood pressure readings to be lower or higher than normal for a short period of time:  · When your blood pressure is higher when you are in a health care provider's office than when you are at home, this is called white coat hypertension. Most people with this condition do not need medicines.  · When your blood pressure is higher at home than when you are in a health care provider's office, this is called masked hypertension. Most people with this condition may need medicines to control blood pressure.  If you have a high blood pressure reading during one visit or you have normal blood pressure with other risk factors, you may be asked to:  · Return on a different day to have your blood pressure checked again.  · Monitor your blood pressure at home for 1 week or longer.  If you are diagnosed with hypertension, you may have other blood or imaging tests to help your health care provider understand your overall risk for other conditions.  How is this treated?  This condition is treated by making healthy lifestyle changes, such as eating healthy foods, exercising more, and reducing your alcohol intake. Your health care provider may prescribe medicine if lifestyle changes are not enough to get your blood pressure under control, and  if:  · Your systolic blood pressure is above 130.  · Your diastolic blood pressure is above 80.  Your personal target blood pressure may vary depending on your medical conditions, your age, and other factors.  Follow these instructions at home:  Eating and drinking    · Eat a diet that is high in fiber and potassium, and low in sodium, added sugar, and fat. An example eating plan is called the DASH (Dietary Approaches to Stop Hypertension) diet. To eat this way:  ? Eat plenty of fresh fruits and vegetables. Try to fill one half of your plate at each meal with fruits and vegetables.  ? Eat whole grains, such as whole-wheat pasta, brown rice, or whole-grain bread. Fill about one fourth of your plate with whole grains.  ? Eat or drink low-fat dairy products, such as skim milk or low-fat yogurt.  ? Avoid fatty cuts of meat, processed or cured meats, and poultry with skin. Fill about one fourth of your plate with lean proteins, such as fish, chicken without skin, beans, eggs, or tofu.  ? Avoid pre-made and processed foods. These tend to be higher in sodium, added sugar, and fat.  · Reduce your daily sodium intake. Most people with hypertension should eat less than 1,500 mg of sodium a day.  · Do not drink alcohol if:  ? Your health care provider tells you not to drink.  ? You are pregnant, may be pregnant, or are planning to become pregnant.  · If you drink alcohol:  ? Limit how much you use to:  § 0-1 drink a day for women.  § 0-2 drinks a day for men.  ? Be aware of how much alcohol is in your drink. In the [place] one drink equals one 12 oz bottle of beer (355 mL), one 5 oz glass of wine (148 mL), or one 1½ oz glass of hard liquor (44 mL).  Lifestyle    · Work with your health care provider to maintain a healthy body weight or to lose weight. Ask what an ideal weight is for you.  · Get at least 30 minutes of exercise most days of the week. Activities may include walking, swimming, or biking.  · Include exercise to  strengthen your muscles (resistance exercise), such as Pilates or lifting weights, as part of your weekly exercise routine. Try to do these types of exercises for 30 minutes at least 3 days a week.  · Do not use any products that contain nicotine or tobacco, such as cigarettes, e-cigarettes, and chewing tobacco. If you need help quitting, ask your health care provider.  · Monitor your blood pressure at home as told by your health care provider.  · Keep all follow-up visits as told by your health care provider. This is important.  Medicines  · Take over-the-counter and prescription medicines only as told by your health care provider. Follow directions carefully. Blood pressure medicines must be taken as prescribed.  · Do not skip doses of blood pressure medicine. Doing this puts you at risk for problems and can make the medicine less effective.  · Ask your health care provider about side effects or reactions to medicines that you should watch for.  Contact a health care provider if you:  · Think you are having a reaction to a medicine you are taking.  · Have headaches that keep coming back (recurring).  · Feel dizzy.  · Have swelling in your ankles.  · Have trouble with your vision.  Get help right away if you:  · Develop a severe headache or confusion.  · Have unusual weakness or numbness.  · Feel faint.  · Have severe pain in your chest or abdomen.  · Vomit repeatedly.  · Have trouble breathing.  Summary  · Hypertension is when the force of blood pumping through your arteries is too strong. If this condition is not controlled, it may put you at risk for serious complications.  · Your personal target blood pressure may vary depending on your medical conditions, your age, and other factors. For most people, a normal blood pressure is less than 120/80.  · Hypertension is treated with lifestyle changes, medicines, or a combination of both. Lifestyle changes include losing weight, eating a healthy, low-sodium diet,  "exercising more, and limiting alcohol.  This information is not intended to replace advice given to you by your health care provider. Make sure you discuss any questions you have with your health care provider.  Document Released: 12/18/2006 Document Revised: 08/28/2019 Document Reviewed: 08/28/2019  [employer] Interactive Patient Education © 2020 [employer]      DASH Eating Plan  DASH stands for \"Dietary Approaches to Stop Hypertension.\" The DASH eating plan is a healthy eating plan that has been shown to reduce high blood pressure (hypertension). It may also reduce your risk for type 2 diabetes, heart disease, and stroke. The DASH eating plan may also help with weight loss.  What are tips for following this plan?    General guidelines  · Avoid eating more than 2,300 mg (milligrams) of salt (sodium) a day. If you have hypertension, you may need to reduce your sodium intake to 1,500 mg a day.  · Limit alcohol intake to no more than 1 drink a day for nonpregnant women and 2 drinks a day for men. One drink equals 12 oz of beer, 5 oz of wine, or 1½ oz of hard liquor.  · Work with your health care provider to maintain a healthy body weight or to lose weight. Ask what an ideal weight is for you.  · Get at least 30 minutes of exercise that causes your heart to beat faster (aerobic exercise) most days of the week. Activities may include walking, swimming, or biking.  · Work with your health care provider or diet and nutrition specialist (dietitian) to adjust your eating plan to your individual calorie needs.  Reading food labels    · Check food labels for the amount of sodium per serving. Choose foods with less than 5 percent of the Daily Value of sodium. Generally, foods with less than 300 mg of sodium per serving fit into this eating plan.  · To find whole grains, look for the word \"whole\" as the first word in the ingredient list.  Shopping  · Buy products labeled as \"low-sodium\" or \"no salt added.\"  · Buy fresh foods. " Avoid canned foods and premade or frozen meals.  Cooking  · Avoid adding salt when cooking. Use salt-free seasonings or herbs instead of table salt or sea salt. Check with your health care provider or pharmacist before using salt substitutes.  · Do not marcum foods. Cook foods using healthy methods such as baking, boiling, grilling, and broiling instead.  · Cook with heart-healthy oils, such as olive, canola, soybean, or sunflower oil.  Meal planning  · Eat a balanced diet that includes:  ? 5 or more servings of fruits and vegetables each day. At each meal, try to fill half of your plate with fruits and vegetables.  ? Up to 6-8 servings of whole grains each day.  ? Less than 6 oz of lean meat, poultry, or fish each day. A 3-oz serving of meat is about the same size as a deck of cards. One egg equals 1 oz.  ? 2 servings of low-fat dairy each day.  ? A serving of nuts, seeds, or beans 5 times each week.  ? Heart-healthy fats. Healthy fats called Omega-3 fatty acids are found in foods such as flaxseeds and coldwater fish, like sardines, salmon, and mackerel.  · Limit how much you eat of the following:  ? Canned or prepackaged foods.  ? Food that is high in trans fat, such as fried foods.  ? Food that is high in saturated fat, such as fatty meat.  ? Sweets, desserts, sugary drinks, and other foods with added sugar.  ? Full-fat dairy products.  · Do not salt foods before eating.  · Try to eat at least 2 vegetarian meals each week.  · Eat more home-cooked food and less restaurant, buffet, and fast food.  · When eating at a restaurant, ask that your food be prepared with less salt or no salt, if possible.  What foods are recommended?  The items listed may not be a complete list. Talk with your dietitian about what dietary choices are best for you.  Grains  Whole-grain or whole-wheat bread. Whole-grain or whole-wheat pasta. Brown rice. Oatmeal. Quinoa. Bulgur. Whole-grain and low-sodium cereals. Regine bread. Low-fat, low-sodium  crackers. Whole-wheat flour tortillas.  Vegetables  Fresh or frozen vegetables (raw, steamed, roasted, or grilled). Low-sodium or reduced-sodium tomato and vegetable juice. Low-sodium or reduced-sodium tomato sauce and tomato paste. Low-sodium or reduced-sodium canned vegetables.  Fruits  All fresh, dried, or frozen fruit. Canned fruit in natural juice (without added sugar).  Meat and other protein foods  Skinless chicken or turkey. Ground chicken or turkey. Pork with fat trimmed off. Fish and seafood. Egg whites. Dried beans, peas, or lentils. Unsalted nuts, nut butters, and seeds. Unsalted canned beans. Lean cuts of beef with fat trimmed off. Low-sodium, lean deli meat.  Dairy  Low-fat (1%) or fat-free (skim) milk. Fat-free, low-fat, or reduced-fat cheeses. Nonfat, low-sodium ricotta or cottage cheese. Low-fat or nonfat yogurt. Low-fat, low-sodium cheese.  Fats and oils  Soft margarine without trans fats. Vegetable oil. Low-fat, reduced-fat, or light mayonnaise and salad dressings (reduced-sodium). Canola, safflower, olive, soybean, and sunflower oils. Avocado.  Seasoning and other foods  Herbs. Spices. Seasoning mixes without salt. Unsalted popcorn and pretzels. Fat-free sweets.  What foods are not recommended?  The items listed may not be a complete list. Talk with your dietitian about what dietary choices are best for you.  Grains  Baked goods made with fat, such as croissants, muffins, or some breads. Dry pasta or rice meal packs.  Vegetables  Creamed or fried vegetables. Vegetables in a cheese sauce. Regular canned vegetables (not low-sodium or reduced-sodium). Regular canned tomato sauce and paste (not low-sodium or reduced-sodium). Regular tomato and vegetable juice (not low-sodium or reduced-sodium). Pickles. Olives.  Fruits  Canned fruit in a light or heavy syrup. Fried fruit. Fruit in cream or butter sauce.  Meat and other protein foods  Fatty cuts of meat. Ribs. Fried meat. Ar[place]. Sausage. Bologna and  other processed lunch meats. Salami. Fatback. Hotdogs. Bratwurst. Salted nuts and seeds. Canned beans with added salt. Canned or smoked fish. Whole eggs or egg yolks. Chicken or turkey with skin.  Dairy  Whole or 2% milk, cream, and half-and-half. Whole or full-fat cream cheese. Whole-fat or sweetened yogurt. Full-fat cheese. Nondairy creamers. Whipped toppings. Processed cheese and cheese spreads.  Fats and oils  Butter. Stick margarine. Lard. Shortening. Ghee. [place] fat. Tropical oils, such as coconut, palm kernel, or palm oil.  Seasoning and other foods  Salted popcorn and pretzels. Onion salt, garlic salt, seasoned salt, table salt, and sea salt. Worcestershire sauce. Tartar sauce. Barbecue sauce. Teriyaki sauce. Soy sauce, including reduced-sodium. Steak sauce. Canned and packaged gravies. Fish sauce. Oyster sauce. Cocktail sauce. Horseradish that you find on the shelf. Ketchup. Mustard. Meat flavorings and tenderizers. Bouillon cubes. Hot sauce and Tabasco sauce. Premade or packaged marinades. Premade or packaged taco seasonings. Relishes. Regular salad dressings.  Where to find more information:  · National [employer], [employer], [employer] [place]: www.nhlbi.nih.gov  · American Heart Association: www.heart.org  Summary  · The DASH eating plan is a healthy eating plan that has been shown to reduce high blood pressure (hypertension). It may also reduce your risk for type 2 diabetes, heart disease, and stroke.  · With the DASH eating plan, you should limit salt (sodium) intake to 2,300 mg a day. If you have hypertension, you may need to reduce your sodium intake to 1,500 mg a day.  · When on the DASH eating plan, aim to eat more fresh fruits and vegetables, whole grains, lean proteins, low-fat dairy, and heart-healthy fats.  · Work with your health care provider or diet and nutrition specialist (dietitian) to adjust your eating plan to your individual calorie needs.  This information is not intended to replace advice  given to you by your health care provider. Make sure you discuss any questions you have with your health care provider.  Document Released: 12/06/2012 Document Revised: 12/11/2017 Document Reviewed: 12/11/2017  [employer] Interactive Patient Education © 2020 [employer]      Obesity, Adult  Obesity is the condition of having too much total body fat. Being overweight or obese means that your weight is greater than what is considered healthy for your body size. Obesity is determined by a measurement called BMI. BMI is an estimate of body fat and is calculated from height and weight. For adults, a BMI of 30 or higher is considered obese.  Obesity can lead to other health concerns and major illnesses, including:  · Stroke.  · Coronary artery disease (CAD).  · Type 2 diabetes.  · Some types of cancer, including cancers of the colon, breast, uterus, and gallbladder.  · Osteoarthritis.  · High blood pressure (hypertension).  · High cholesterol.  · Sleep apnea.  · Gallbladder stones.  · Infertility problems.  What are the causes?  Common causes of this condition include:  · Eating daily meals that are high in calories, sugar, and fat.  · Being born with genes that may make you more likely to become obese.  · Having a medical condition that causes obesity, including:  ? Hypothyroidism.  ? Polycystic ovarian syndrome (PCOS).  ? Binge-eating disorder.  ? Cushing syndrome.  · Taking certain medicines, such as steroids, antidepressants, and seizure medicines.  · Not being physically active (sedentary lifestyle).  · Not getting enough sleep.  · Drinking high amounts of sugar-sweetened beverages, such as soft drinks.  What increases the risk?  The following factors may make you more likely to develop this condition:  · Having a family history of obesity.  · Being a woman of  descent.  · Being a man of  descent.  · Living in an area with limited access to:  ? [place], recreation centers, or  sidewalks.  ? Healthy food choices, such as grocery stores and farmers' markets.  What are the signs or symptoms?  The main sign of this condition is having too much body fat.  How is this diagnosed?  This condition is diagnosed based on:  · Your BMI. If you are an adult with a BMI of 30 or higher, you are considered obese.  · Your waist circumference. This measures the distance around your waistline.  · Your skinfold thickness. Your health care provider may gently pinch a fold of your skin and measure it.  You may have other tests to check for underlying conditions.  How is this treated?  Treatment for this condition often includes changing your lifestyle. Treatment may include some or all of the following:  · Dietary changes. This may include developing a healthy meal plan.  · Regular physical activity. This may include activity that causes your heart to beat faster (aerobic exercise) and strength training. Work with your health care provider to design an exercise program that works for you.  · Medicine to help you lose weight if you are unable to lose 1 pound a week after 6 weeks of healthy eating and more physical activity.  · Treating conditions that cause the obesity (underlying conditions).  · Surgery. Surgical options may include gastric banding and gastric bypass. Surgery may be done if:  ? Other treatments have not helped to improve your condition.  ? You have a BMI of 40 or higher.  ? You have life-threatening health problems related to obesity.  Follow these instructions at home:  Eating and drinking    · Follow recommendations from your health care provider about what you eat and drink. Your health care provider may advise you to:  ? Limit fast food, sweets, and processed snack foods.  ? Choose low-fat options, such as low-fat milk instead of whole milk.  ? Eat 5 or more servings of fruits or vegetables every day.  ? Eat at home more often. This gives you more control over what you eat.  ? Choose  healthy foods when you eat out.  ? Learn to read food labels. This will help you understand how much food is considered 1 serving.  ? Learn what a healthy serving size is.  ? Keep low-fat snacks available.  ? Limit sugary drinks, such as soda, fruit juice, sweetened iced tea, and flavored milk.  · Drink enough water to keep your urine pale yellow.  · Do not follow a fad diet. Fad diets can be unhealthy and even dangerous.  Physical activity  · Exercise regularly, as told by your health care provider.  ? Most adults should get up to 150 minutes of moderate-intensity exercise every week.  ? Ask your health care provider what types of exercise are safe for you and how often you should exercise.  · Warm up and stretch before being active.  · Cool down and stretch after being active.  · Rest between periods of activity.  Lifestyle  · Work with your health care provider and a dietitian to set a weight-loss goal that is healthy and reasonable for you.  · Limit your screen time.  · Find ways to reward yourself that do not involve food.  · Do not drink alcohol if:  ? Your health care provider tells you not to drink.  ? You are pregnant, may be pregnant, or are planning to become pregnant.  · If you drink alcohol:  ? Limit how much you use to:  § 0-1 drink a day for women.  § 0-2 drinks a day for men.  ? Be aware of how much alcohol is in your drink. In the [place] one drink equals one 12 oz bottle of beer (355 mL), one 5 oz glass of wine (148 mL), or one 1½ oz glass of hard liquor (44 mL).  General instructions  · Keep a weight-loss journal to keep track of the food you eat and how much exercise you get.  · Take over-the-counter and prescription medicines only as told by your health care provider.  · Take vitamins and supplements only as told by your health care provider.  · Consider joining a support group. Your health care provider may be able to recommend a support group.  · Keep all follow-up visits as told by your health  care provider. This is important.  Contact a health care provider if:  · You are unable to meet your weight loss goal after 6 weeks of dietary and lifestyle changes.  Get help right away if you are having:  · Trouble breathing.  · Suicidal thoughts or behaviors.  Summary  · Obesity is the condition of having too much total body fat.  · Being overweight or obese means that your weight is greater than what is considered healthy for your body size.  · Work with your health care provider and a dietitian to set a weight-loss goal that is healthy and reasonable for you.  · Exercise regularly, as told by your health care provider. Ask your health care provider what types of exercise are safe for you and how often you should exercise.  This information is not intended to replace advice given to you by your health care provider. Make sure you discuss any questions you have with your health care provider.  Document Released: 01/25/2006 Document Revised: 08/22/2019 Document Reviewed: 08/22/2019  [employer] Patient Education © 2020 [employer]      Type 2 Diabetes Mellitus, Diagnosis, Adult  Type 2 diabetes (type 2 diabetes mellitus) is a long-term (chronic) disease. In type 2 diabetes, one or both of these problems may be present:  · The pancreas does not make enough of a hormone called insulin.  · Cells in the body do not respond properly to insulin that the body makes (insulin resistance).  Normally, insulin allows blood sugar (glucose) to enter cells in the body. The cells use glucose for energy. Insulin resistance or lack of insulin causes excess glucose to build up in the blood instead of going into cells. As a result, high blood glucose (hyperglycemia) develops.  What increases the risk?  The following factors may make you more likely to develop type 2 diabetes:  · Having a family member with type 2 diabetes.  · Being overweight or obese.  · Having an inactive (sedentary) lifestyle.  · Having been diagnosed  with insulin resistance.  · Having a history of prediabetes, gestational diabetes, or polycystic ovary syndrome (PCOS).  · Being of American-, -American, /, or / descent.  What are the signs or symptoms?  In the early stage of this condition, you may not have symptoms. Symptoms develop slowly and may include:  · Increased thirst (polydipsia).  · Increased hunger (polyphagia).  · Increased urination (polyuria).  · Increased urination during the night (nocturia).  · Unexplained weight loss.  · Frequent infections that keep coming back (recurring).  · Fatigue.  · Weakness.  · Vision changes, such as blurry vision.  · Cuts or bruises that are slow to heal.  · Tingling or numbness in the hands or feet.  · Dark patches on the skin (acanthosis nigricans).  How is this diagnosed?  This condition is diagnosed based on your symptoms, your medical history, a physical exam, and your blood glucose level. Your blood glucose may be checked with one or more of the following blood tests:  · A fasting blood glucose (FBG) test. You will not be allowed to eat (you will fast) for 8 hours or longer before a blood sample is taken.  · A random blood glucose test. This test checks blood glucose at any time of day regardless of when you ate.  · An A1c (hemoglobin A1c) blood test. This test provides information about blood glucose control over the previous 2-3 months.  · An oral glucose tolerance test (OGTT). This test measures your blood glucose at two times:  ? After fasting. This is your baseline blood glucose level.  ? Two hours after drinking a beverage that contains glucose.  You may be diagnosed with type 2 diabetes if:  · Your FBG level is 126 mg/dL (7.0 mmol/L) or higher.  · Your random blood glucose level is 200 mg/dL (11.1 mmol/L) or higher.  · Your A1c level is 6.5% or higher.  · Your OGTT result is higher than 200 mg/dL (11.1 mmol/L).  These blood tests may be repeated to confirm  your diagnosis.  How is this treated?  Your treatment may be managed by a specialist called an endocrinologist. Type 2 diabetes may be treated by following instructions from your health care provider about:  · Making diet and lifestyle changes. This may include:  ? Following an individualized nutrition plan that is developed by a diet and nutrition specialist (registered dietitian).  ? Exercising regularly.  ? Finding ways to manage stress.  · Checking your blood glucose level as often as told.  · Taking diabetes medicines or insulin daily. This helps to keep your blood glucose levels in the healthy range.  ? If you use insulin, you may need to adjust the dosage depending on how physically active you are and what foods you eat. Your health care provider will tell you how to adjust your dosage.  · Taking medicines to help prevent complications from diabetes, such as:  ? Aspirin.  ? Medicine to lower cholesterol.  ? Medicine to control blood pressure.  Your health care provider will set individualized treatment goals for you. Your goals will be based on your age, other medical conditions you have, and how you respond to diabetes treatment. Generally, the goal of treatment is to maintain the following blood glucose levels:  · Before meals (preprandial):  mg/dL (4.4-7.2 mmol/L).  · After meals (postprandial): below 180 mg/dL (10 mmol/L).  · A1c level: less than 7%.  Follow these instructions at home:  Questions to ask your health care provider  · Consider asking the following questions:  ? Do I need to meet with a diabetes educator?  ? Where can I find a support group for people with diabetes?  ? What equipment will I need to manage my diabetes at home?  ? What diabetes medicines do I need, and when should I take them?  ? How often do I need to check my blood glucose?  ? What number can I call if I have questions?  ? When is my next appointment?  General instructions  · Take over-the-counter and prescription  medicines only as told by your health care provider.  · Keep all follow-up visits as told by your health care provider. This is important.  · For more information about diabetes, visit:  ? American Diabetes Association (ADA): www.diabetes.org  ? American Association of Diabetes Educators (AADE): www.diabeteseducator.org  Contact a health care provider if:  · Your blood glucose is at or above 240 mg/dL (13.3 mmol/L) for 2 days in a row.  · You have been sick or have had a fever for 2 days or longer, and you are not getting better.  · You have any of the following problems for more than 6 hours:  ? You cannot eat or drink.  ? You have nausea and vomiting.  ? You have diarrhea.  Get help right away if:  · Your blood glucose is lower than 54 mg/dL (3.0 mmol/L).  ·

## 2020-08-01 ENCOUNTER — RESULTS ENCOUNTER (OUTPATIENT)
Dept: FAMILY MEDICINE CLINIC | Facility: CLINIC | Age: 75
End: 2020-08-01

## 2020-08-01 DIAGNOSIS — E11.65 TYPE 2 DIABETES MELLITUS WITH HYPERGLYCEMIA, WITHOUT LONG-TERM CURRENT USE OF INSULIN (HCC): ICD-10-CM

## 2020-08-01 DIAGNOSIS — I10 BENIGN ESSENTIAL HTN: ICD-10-CM

## 2020-08-03 DIAGNOSIS — E11.65 TYPE 2 DIABETES MELLITUS WITH HYPERGLYCEMIA, WITHOUT LONG-TERM CURRENT USE OF INSULIN (HCC): ICD-10-CM

## 2020-08-03 RX ORDER — INSULIN GLARGINE 100 [IU]/ML
INJECTION, SOLUTION SUBCUTANEOUS
Qty: 9 PEN | Refills: 0 | Status: SHIPPED | OUTPATIENT
Start: 2020-08-03 | End: 2020-11-02

## 2020-08-12 LAB
ALBUMIN SERPL-MCNC: 4.4 G/DL (ref 3.5–5.2)
ALBUMIN/GLOB SERPL: 2.1 G/DL
ALP SERPL-CCNC: 78 U/L (ref 39–117)
ALT SERPL-CCNC: 43 U/L (ref 1–41)
AST SERPL-CCNC: 31 U/L (ref 1–40)
BILIRUB SERPL-MCNC: 0.5 MG/DL (ref 0–1.2)
BUN SERPL-MCNC: 12 MG/DL (ref 8–23)
BUN/CREAT SERPL: 9.9 (ref 7–25)
CALCIUM SERPL-MCNC: 8.8 MG/DL (ref 8.6–10.5)
CHLORIDE SERPL-SCNC: 100 MMOL/L (ref 98–107)
CO2 SERPL-SCNC: 25.5 MMOL/L (ref 22–29)
CREAT SERPL-MCNC: 1.21 MG/DL (ref 0.76–1.27)
ERYTHROCYTE [DISTWIDTH] IN BLOOD BY AUTOMATED COUNT: 13.4 % (ref 12.3–15.4)
GLOBULIN SER CALC-MCNC: 2.1 GM/DL
GLUCOSE SERPL-MCNC: 238 MG/DL (ref 65–99)
HBA1C MFR BLD: 9.4 % (ref 4.8–5.6)
HCT VFR BLD AUTO: 40.8 % (ref 37.5–51)
HGB BLD-MCNC: 13.8 G/DL (ref 13–17.7)
MCH RBC QN AUTO: 29.4 PG (ref 26.6–33)
MCHC RBC AUTO-ENTMCNC: 33.8 G/DL (ref 31.5–35.7)
MCV RBC AUTO: 87 FL (ref 79–97)
PLATELET # BLD AUTO: 234 10*3/MM3 (ref 140–450)
POTASSIUM SERPL-SCNC: 4.5 MMOL/L (ref 3.5–5.2)
PROT SERPL-MCNC: 6.5 G/DL (ref 6–8.5)
RBC # BLD AUTO: 4.69 10*6/MM3 (ref 4.14–5.8)
SODIUM SERPL-SCNC: 138 MMOL/L (ref 136–145)
WBC # BLD AUTO: 7.33 10*3/MM3 (ref 3.4–10.8)

## 2020-09-02 ENCOUNTER — TELEPHONE (OUTPATIENT)
Dept: FAMILY MEDICINE CLINIC | Facility: CLINIC | Age: 75
End: 2020-09-02

## 2020-09-02 NOTE — TELEPHONE ENCOUNTER
----- Message from Pallavi Mtz MD sent at 8/31/2020  5:38 PM CDT -----  Have we tried to get him approved for any pt assistance programs?     ----- Message -----  From: Valery Oconnell MA  Sent: 8/27/2020   1:48 PM CDT  To: Pallavi Mtz MD    I have called patient he states that this was attempted in the past it is not a medication that he can afford with his insurance his cost was around $500 [facility] its not a formulary drug on his Medicare Part D plan. Please review and advise how you would like to proceed.

## 2020-09-02 NOTE — TELEPHONE ENCOUNTER
I have called the patient and spoke with him about the [place] Patient Assistance Program. I have informed the patient that I would need the following information.  Household income  Printout YTD of cost of all medications out of pocket for household  Complete the Application  I would obtain the pharmacy printout for the Trulicity cost.  Patient seemed to have little interest in this process he was quick to say he would need to talk with his wife and hung up.     Please advise

## 2020-09-11 DIAGNOSIS — E11.9 TYPE 2 DIABETES MELLITUS WITH HEMOGLOBIN A1C GOAL OF LESS THAN 7.0% (HCC): ICD-10-CM

## 2020-09-11 RX ORDER — GLIMEPIRIDE 2 MG/1
TABLET ORAL
Qty: 90 TABLET | Refills: 0 | Status: SHIPPED | OUTPATIENT
Start: 2020-09-11 | End: 2020-12-07

## 2020-09-30 DIAGNOSIS — I10 BENIGN ESSENTIAL HTN: ICD-10-CM

## 2020-10-02 RX ORDER — METOPROLOL TARTRATE 50 MG/1
TABLET, FILM COATED ORAL
Qty: 180 TABLET | Refills: 3 | Status: SHIPPED | OUTPATIENT
Start: 2020-10-02 | End: 2021-09-21 | Stop reason: SDUPTHER

## 2020-10-04 DIAGNOSIS — I10 BENIGN ESSENTIAL HTN: ICD-10-CM

## 2020-10-04 DIAGNOSIS — E78.5 HYPERLIPIDEMIA, UNSPECIFIED HYPERLIPIDEMIA TYPE: ICD-10-CM

## 2020-10-06 RX ORDER — AMLODIPINE BESYLATE AND BENAZEPRIL HYDROCHLORIDE 10; 20 MG/1; MG/1
CAPSULE ORAL
Qty: 90 CAPSULE | Refills: 3 | Status: SHIPPED | OUTPATIENT
Start: 2020-10-06 | End: 2021-09-21 | Stop reason: SDUPTHER

## 2020-10-06 RX ORDER — ATORVASTATIN CALCIUM 80 MG/1
TABLET, FILM COATED ORAL
Qty: 90 TABLET | Refills: 3 | Status: SHIPPED | OUTPATIENT
Start: 2020-10-06 | End: 2021-09-21 | Stop reason: SDUPTHER

## 2020-10-19 ENCOUNTER — OFFICE VISIT (OUTPATIENT)
Dept: FAMILY MEDICINE CLINIC | Facility: CLINIC | Age: 75
End: 2020-10-19

## 2020-10-19 VITALS
HEIGHT: 68 IN | RESPIRATION RATE: 20 BRPM | OXYGEN SATURATION: 98 % | WEIGHT: 234 LBS | DIASTOLIC BLOOD PRESSURE: 78 MMHG | BODY MASS INDEX: 35.46 KG/M2 | SYSTOLIC BLOOD PRESSURE: 156 MMHG | TEMPERATURE: 97.5 F | HEART RATE: 61 BPM

## 2020-10-19 DIAGNOSIS — I10 ESSENTIAL HYPERTENSION: ICD-10-CM

## 2020-10-19 DIAGNOSIS — H93.11 TINNITUS OF RIGHT EAR: ICD-10-CM

## 2020-10-19 DIAGNOSIS — Z00.00 MEDICARE ANNUAL WELLNESS VISIT, SUBSEQUENT: Primary | ICD-10-CM

## 2020-10-19 DIAGNOSIS — Z23 FLU VACCINE NEED: ICD-10-CM

## 2020-10-19 PROCEDURE — G0008 ADMIN INFLUENZA VIRUS VAC: HCPCS | Performed by: FAMILY MEDICINE

## 2020-10-19 PROCEDURE — G0439 PPPS, SUBSEQ VISIT: HCPCS | Performed by: FAMILY MEDICINE

## 2020-10-19 PROCEDURE — 90694 VACC AIIV4 NO PRSRV 0.5ML IM: CPT | Performed by: FAMILY MEDICINE

## 2020-10-19 RX ORDER — BENAZEPRIL HYDROCHLORIDE 5 MG/1
5 TABLET, FILM COATED ORAL DAILY
Qty: 30 TABLET | Refills: 0 | Status: SHIPPED | OUTPATIENT
Start: 2020-10-19 | End: 2020-11-12

## 2020-10-19 RX ORDER — AZELASTINE 1 MG/ML
2 SPRAY, METERED NASAL 2 TIMES DAILY
Qty: 30 ML | Refills: 2 | Status: SHIPPED | OUTPATIENT
Start: 2020-10-19 | End: 2021-02-25

## 2020-10-19 NOTE — PROGRESS NOTES
The ABCs of the Annual Wellness Visit  Subsequent Medicare Wellness Visit    Chief Complaint   Patient presents with   • Medicare Wellness-subsequent     Pt here for medicare wellness     Answers for HPI/ROS submitted by the patient on 10/19/2020   Diabetes problem  What is the primary reason for your visit?: Diabetes  Diabetes type: type 2  MedicAlert ID: No  Disease duration: 11 years  blurred vision: No  foot paresthesias: No  foot ulcerations: No  visual change: No  weight loss: No  Symptom course: stable  hunger: No  mood changes: No  sleepiness: No  sweats: No  blackouts: No  hospitalization: No  nocturnal hypoglycemia: No  required assistance: No  required glucagon: No  CVA: No  heart disease: No  impotence: No  nephropathy: No  peripheral neuropathy: No  PVD: No  retinopathy: No  CAD risks: dyslipidemia, obesity  Current treatments: diet, insulin injections, oral agent (triple therapy)  Treatment compliance: all of the time  Dose schedule: at bedtime  Given by: patient  Injection sites: abdominal wall, thighs  Home blood tests: 1-2 x per week  Home urines: <1 x per month  Monitoring compliance: good  Blood glucose trend: no change  breakfast time: 9-10 am  breakfast glucose level: >200  High score: >200  Overall: >200  Weight trend: stable  Current diet: generally healthy  Meal planning: avoidance of concentrated sweets  Exercise: intermittently  Dietitian visit: No  Eye exam current: Yes  Sees podiatrist: No    Subjective   History of Present Illness:  Cordell Carreon is a 75 y.o. male who presents for a Subsequent Medicare Wellness Visit.    HEALTH RISK ASSESSMENT    Recent Hospitalizations:  No hospitalization(s) within the last year.    Current Medical Providers:  Patient Care Team:  Pallavi Mtz MD as PCP - General (Family Medicine)  Carlos Valenzuela OD as PCP - Claims Attributed    Smoking Status:  Social History     Tobacco Use   Smoking Status Never Smoker   Smokeless Tobacco Never Used              Alcohol Consumption:  Social History     Substance and Sexual Activity   Alcohol Use Yes    Comment: occ       Depression Screen:   PHQ-2/PHQ-9 Depression Screening 10/19/2020   Little interest or pleasure in doing things 0   Feeling down, depressed, or hopeless 0   Total Score 0       Fall Risk Screen:  SANDRA Fall Risk Assessment was completed, and patient is at LOW risk for falls.Assessment completed on:10/19/2020    Health Habits and Functional and Cognitive Screening:  Functional & Cognitive Status 10/19/2020   Do you have difficulty preparing food and eating? No   Do you have difficulty bathing yourself, getting dressed or grooming yourself? No   Do you have difficulty using the toilet? No   Do you have difficulty moving around from place to place? No   Do you have trouble with steps or getting out of a bed or a chair? No   Current Diet Well Balanced Diet   Dental Exam Up to date   Eye Exam Up to date   Exercise (times per week) 2 times per week   Current Exercise Activities Include Yard Work   Do you need help using the phone?  No   Are you deaf or do you have serious difficulty hearing?  No   Do you need help with transportation? No   Do you need help shopping? No   Do you need help preparing meals?  No   Do you need help with housework?  No   Do you need help with laundry? No   Do you need help taking your medications? No   Do you need help managing money? No   Do you ever drive or ride in a car without wearing a seat belt? No   Have you felt unusual stress, anger or loneliness in the last month? No   Who do you live with? Spouse   If you need help, do you have trouble finding someone available to you? No   Have you been bothered in the last four weeks by sexual problems? No   Do you have difficulty concentrating, remembering or making decisions? No         Does the patient have evidence of cognitive impairment? No    Asprin use counseling:Taking ASA appropriately as indicated    Age-appropriate  Screening Schedule:  Refer to the list below for future screening recommendations based on patient's age, sex and/or medical conditions. Orders for these recommended tests are listed in the plan section. The patient has been provided with a written plan.    Health Maintenance   Topic Date Due   • ZOSTER VACCINE (2 of 2) 01/26/2017   • DIABETIC FOOT EXAM  10/02/2018   • DIABETIC EYE EXAM  07/01/2020   • INFLUENZA VACCINE  08/01/2020   • LIPID PANEL  01/24/2021   • HEMOGLOBIN A1C  02/11/2021   • URINE MICROALBUMIN  02/25/2021   • TDAP/TD VACCINES (2 - Td) 12/01/2026   • COLONOSCOPY  05/18/2027          The following portions of the patient's history were reviewed and updated as appropriate: allergies, current medications, past family history, past medical history, past social history, past surgical history and problem list.    Outpatient Medications Prior to Visit   Medication Sig Dispense Refill   • amLODIPine-benazepril (LOTREL) 10-20 MG per capsule TAKE 1 CAPSULE BY MOUTH EVERY DAY 90 capsule 3   • aspirin 81 MG EC tablet Take 81 mg by mouth Daily.     • atorvastatin (LIPITOR) 80 MG tablet TAKE 1 TABLET BY MOUTH EVERY DAY 90 tablet 3   • glimepiride (AMARYL) 2 MG tablet TAKE 1 TABLET BY MOUTH EVERY DAY BEFORE BREAKFAST 90 tablet 0   • glucose blood (ONE TOUCH ULTRA TEST) test strip Test once daily. 100 each 12   • Insulin Glargine (BASAGLAR KWIKPEN) 100 UNIT/ML injection pen INJECT 10 UNITS UNDER THE SKIN INTO THE APPROPRIATE AREA AS DIRECTED EVERY NIGHT. (Patient taking differently: 30 Units.) 9 pen 0   • metFORMIN (GLUCOPHAGE) 1000 MG tablet Take 1 tablet by mouth 2 (Two) Times a Day With Meals. 180 tablet 3   • metoprolol tartrate (LOPRESSOR) 50 MG tablet TAKE 1 TABLET BY MOUTH EVERY 12 HOURS 180 tablet 3   • naproxen sodium (ALEVE) 220 MG tablet Take 220 mg by mouth Daily.     • ONETOUCH DELICA LANCETS FINE misc Test once daily. 100 each 12     No facility-administered medications prior to visit.        Patient  "Active Problem List   Diagnosis   • Need for hepatitis C screening test   • Type 2 diabetes mellitus with hyperglycemia, without long-term current use of insulin (CMS/[facility])   • H/O heart bypass surgery   • Benign essential HTN   • Hyperlipidemia   • History of colon polyps   • Class 2 severe obesity due to excess calories with serious comorbidity and body mass index (BMI) of 35.0 to 35.9 in adult (CMS/[facility])       Advanced Care Planning:  ACP discussion was held with the patient during this visit. Patient has an advance directive (not in EMR), copy requested.    Review of Systems    Compared to one year ago, the patient feels his physical health is the same.  Compared to one year ago, the patient feels his mental health is the same.    Reviewed chart for potential of high risk medication in the elderly: yes  Reviewed chart for potential of harmful drug interactions in the elderly:yes    Objective         Vitals:    10/19/20 1426   BP: 156/78   BP Location: Left arm   Patient Position: Sitting   Cuff Size: Adult   Pulse: 61   Resp: 20   Temp: 97.5 °F (36.4 °C)   TempSrc: Infrared   SpO2: 98%   Weight: 106 kg (234 lb)   Height: 173.5 cm (68.31\")   PainSc:   2   PainLoc: Knee       Body mass index is 35.26 kg/m².  Discussed the patient's BMI with him. The BMI is above average; BMI management plan is completed.    Physical Exam  Vitals signs and nursing note reviewed.   Constitutional:       General: He is not in acute distress.     Appearance: He is obese. He is not toxic-appearing.   HENT:      Head: Normocephalic and atraumatic.      Right Ear: Tympanic membrane, ear canal and external ear normal.      Left Ear: Tympanic membrane, ear canal and external ear normal.   Cardiovascular:      Pulses:           Dorsalis pedis pulses are 1+ on the right side and 1+ on the left side.        Posterior tibial pulses are 2+ on the right side and 2+ on the left side.   Musculoskeletal:      Right foot: No deformity.      Left foot: " No deformity.   Feet:      Right foot:      Skin integrity: Callus present.      Toenail Condition: Right toenails are normal.      Left foot:      Skin integrity: Callus present.      Toenail Condition: Left toenails are normal.   Skin:     General: Skin is warm and dry.   Neurological:      Mental Status: He is alert and oriented to person, place, and time. Mental status is at baseline.   Psychiatric:         Behavior: Behavior normal.         Thought Content: Thought content normal.         Judgment: Judgment normal.         Lab Results   Component Value Date     (H) 08/11/2020    HGBA1C 9.40 (H) 08/11/2020        Assessment/Plan   Medicare Risks and Personalized Health Plan  CMS Preventative Services Quick Reference  Advance Directive Discussion  Alcohol Misuse  Cardiovascular risk  Chronic Pain   Colon Cancer Screening  Dementia/Memory   Depression/Dysphoria  Diabetic Lab Screening   Fall Risk  Immunizations Discussed/Encouraged (specific immunizations; adacel Tdap, Influenza, Pneumococcal 23, Prevnar and Shingrix )  Obesity/Overweight     The above risks/problems have been discussed with the patient.  Pertinent information has been shared with the patient in the After Visit Summary.  Follow up plans and orders are seen below in the Assessment/Plan Section.    Diagnoses and all orders for this visit:    1. Medicare annual wellness visit, subsequent (Primary)    2. Flu vaccine need  -     Fluad Quad 65+ yrs ([id number])    3. Essential hypertension  -     benazepril (LOTENSIN) 5 MG tablet; Take 1 tablet by mouth Daily.  Dispense: 30 tablet; Refill: 0    4. Tinnitus of right ear  -     azelastine (ASTELIN) 0.1 % nasal spray; 2 sprays into the nostril(s) as directed by provider 2 (Two) Times a Day. Use in each nostril as directed  Dispense: 30 mL; Refill: 2      Follow Up:  Return in about 29 days (around 11/17/2020) for Recheck DM .   Recommended AMIRA but pt declines   Return if ear not better and will send  for audiology eval    An After Visit Summary and PPPS were given to the patient.                 This document has been electronically signed by Pallavi Mtz MD on October 19, 2020 16:08 CDT       toileting

## 2020-10-19 NOTE — PATIENT INSTRUCTIONS
Medicare Wellness  Personal Prevention Plan of Service     Date of Office Visit:  10/19/2020  Encounter Provider:  Pallavi Mtz MD  Place of Service:  [facility]  Patient Name: Cordell Carreon  :  1945    As part of the Medicare Wellness portion of your visit today, we are providing you with this personalized preventive plan of services (PPPS). This plan is based upon recommendations of the [place] Preventive Services Task Force (USPSTF) and the Advisory Committee on Immunization Practices (ACIP).    This lists the preventive care services that should be considered, and provides dates of when you are due. Items listed as completed are up-to-date and do not require any further intervention.    Health Maintenance   Topic Date Due   • ZOSTER VACCINE (2 of 2) 2017   • DIABETIC FOOT EXAM  10/02/2018   • DIABETIC EYE EXAM  2020   • INFLUENZA VACCINE  2020   • LIPID PANEL  2021   • HEMOGLOBIN A1C  2021   • URINE MICROALBUMIN  2021   • ANNUAL WELLNESS VISIT  10/19/2021   • TDAP/TD VACCINES (2 - Td) 2026   • COLONOSCOPY  2027   • Pneumococcal Vaccine 65+  Completed   • HEPATITIS C SCREENING  Addressed       Orders Placed This Encounter   Procedures   • Fluad Quad 65+ yrs ([id number])       Return in about 29 days (around 2020) for Recheck DM .

## 2020-10-29 DIAGNOSIS — E11.65 TYPE 2 DIABETES MELLITUS WITH HYPERGLYCEMIA, WITHOUT LONG-TERM CURRENT USE OF INSULIN (HCC): ICD-10-CM

## 2020-11-02 RX ORDER — INSULIN GLARGINE 100 [IU]/ML
INJECTION, SOLUTION SUBCUTANEOUS
Qty: 27 PEN | Refills: 1 | Status: SHIPPED | OUTPATIENT
Start: 2020-11-02 | End: 2021-02-26

## 2020-11-12 DIAGNOSIS — I10 ESSENTIAL HYPERTENSION: ICD-10-CM

## 2020-11-12 RX ORDER — BENAZEPRIL HYDROCHLORIDE 5 MG/1
TABLET, FILM COATED ORAL
Qty: 30 TABLET | Refills: 0 | Status: SHIPPED | OUTPATIENT
Start: 2020-11-12 | End: 2020-12-08

## 2020-11-19 ENCOUNTER — OFFICE VISIT (OUTPATIENT)
Dept: FAMILY MEDICINE CLINIC | Facility: CLINIC | Age: 75
End: 2020-11-19

## 2020-11-19 VITALS
BODY MASS INDEX: 35.49 KG/M2 | RESPIRATION RATE: 16 BRPM | WEIGHT: 234.2 LBS | DIASTOLIC BLOOD PRESSURE: 76 MMHG | SYSTOLIC BLOOD PRESSURE: 130 MMHG | HEART RATE: 52 BPM | TEMPERATURE: 97.1 F | OXYGEN SATURATION: 97 % | HEIGHT: 68 IN

## 2020-11-19 DIAGNOSIS — I10 ESSENTIAL HYPERTENSION: Primary | ICD-10-CM

## 2020-11-19 DIAGNOSIS — R94.4 DECREASED GFR: ICD-10-CM

## 2020-11-19 DIAGNOSIS — E66.01 CLASS 2 SEVERE OBESITY DUE TO EXCESS CALORIES WITH SERIOUS COMORBIDITY AND BODY MASS INDEX (BMI) OF 35.0 TO 35.9 IN ADULT (HCC): ICD-10-CM

## 2020-11-19 DIAGNOSIS — E11.65 TYPE 2 DIABETES MELLITUS WITH HYPERGLYCEMIA, WITHOUT LONG-TERM CURRENT USE OF INSULIN (HCC): ICD-10-CM

## 2020-11-19 DIAGNOSIS — E78.2 MIXED HYPERLIPIDEMIA: ICD-10-CM

## 2020-11-19 PROCEDURE — 99214 OFFICE O/P EST MOD 30 MIN: CPT | Performed by: FAMILY MEDICINE

## 2020-11-19 NOTE — PATIENT INSTRUCTIONS
Obesity, Adult  Obesity is the condition of having too much total body fat. Being overweight or obese means that your weight is greater than what is considered healthy for your body size. Obesity is determined by a measurement called BMI. BMI is an estimate of body fat and is calculated from height and weight. For adults, a BMI of 30 or higher is considered obese.  Obesity can lead to other health concerns and major illnesses, including:  · Stroke.  · Coronary artery disease (CAD).  · Type 2 diabetes.  · Some types of cancer, including cancers of the colon, breast, uterus, and gallbladder.  · Osteoarthritis.  · High blood pressure (hypertension).  · High cholesterol.  · Sleep apnea.  · Gallbladder stones.  · Infertility problems.  What are the causes?  Common causes of this condition include:  · Eating daily meals that are high in calories, sugar, and fat.  · Being born with genes that may make you more likely to become obese.  · Having a medical condition that causes obesity, including:  ? Hypothyroidism.  ? Polycystic ovarian syndrome (PCOS).  ? Binge-eating disorder.  ? Cushing syndrome.  · Taking certain medicines, such as steroids, antidepressants, and seizure medicines.  · Not being physically active (sedentary lifestyle).  · Not getting enough sleep.  · Drinking high amounts of sugar-sweetened beverages, such as soft drinks.  What increases the risk?  The following factors may make you more likely to develop this condition:  · Having a family history of obesity.  · Being a woman of  descent.  · Being a man of  descent.  · Living in an area with limited access to:  ? [place], recreation centers, or sidewalks.  ? Healthy food choices, such as grocery stores and farmers' markets.  What are the signs or symptoms?  The main sign of this condition is having too much body fat.  How is this diagnosed?  This condition is diagnosed based on:  · Your BMI. If you are an adult with a BMI of 30 or  higher, you are considered obese.  · Your waist circumference. This measures the distance around your waistline.  · Your skinfold thickness. Your health care provider may gently pinch a fold of your skin and measure it.  You may have other tests to check for underlying conditions.  How is this treated?  Treatment for this condition often includes changing your lifestyle. Treatment may include some or all of the following:  · Dietary changes. This may include developing a healthy meal plan.  · Regular physical activity. This may include activity that causes your heart to beat faster (aerobic exercise) and strength training. Work with your health care provider to design an exercise program that works for you.  · Medicine to help you lose weight if you are unable to lose 1 pound a week after 6 weeks of healthy eating and more physical activity.  · Treating conditions that cause the obesity (underlying conditions).  · Surgery. Surgical options may include gastric banding and gastric bypass. Surgery may be done if:  ? Other treatments have not helped to improve your condition.  ? You have a BMI of 40 or higher.  ? You have life-threatening health problems related to obesity.  Follow these instructions at home:  Eating and drinking    · Follow recommendations from your health care provider about what you eat and drink. Your health care provider may advise you to:  ? Limit fast food, sweets, and processed snack foods.  ? Choose low-fat options, such as low-fat milk instead of whole milk.  ? Eat 5 or more servings of fruits or vegetables every day.  ? Eat at home more often. This gives you more control over what you eat.  ? Choose healthy foods when you eat out.  ? Learn to read food labels. This will help you understand how much food is considered 1 serving.  ? Learn what a healthy serving size is.  ? Keep low-fat snacks available.  ? Limit sugary drinks, such as soda, fruit juice, sweetened iced tea, and flavored  milk.  · Drink enough water to keep your urine pale yellow.  · Do not follow a fad diet. Fad diets can be unhealthy and even dangerous.  Physical activity  · Exercise regularly, as told by your health care provider.  ? Most adults should get up to 150 minutes of moderate-intensity exercise every week.  ? Ask your health care provider what types of exercise are safe for you and how often you should exercise.  · Warm up and stretch before being active.  · Cool down and stretch after being active.  · Rest between periods of activity.  Lifestyle  · Work with your health care provider and a dietitian to set a weight-loss goal that is healthy and reasonable for you.  · Limit your screen time.  · Find ways to reward yourself that do not involve food.  · Do not drink alcohol if:  ? Your health care provider tells you not to drink.  ? You are pregnant, may be pregnant, or are planning to become pregnant.  · If you drink alcohol:  ? Limit how much you use to:  § 0-1 drink a day for women.  § 0-2 drinks a day for men.  ? Be aware of how much alcohol is in your drink. In the [place] one drink equals one 12 oz bottle of beer (355 mL), one 5 oz glass of wine (148 mL), or one 1½ oz glass of hard liquor (44 mL).  General instructions  · Keep a weight-loss journal to keep track of the food you eat and how much exercise you get.  · Take over-the-counter and prescription medicines only as told by your health care provider.  · Take vitamins and supplements only as told by your health care provider.  · Consider joining a support group. Your health care provider may be able to recommend a support group.  · Keep all follow-up visits as told by your health care provider. This is important.  Contact a health care provider if:  · You are unable to meet your weight loss goal after 6 weeks of dietary and lifestyle changes.  Get help right away if you are having:  · Trouble breathing.  · Suicidal thoughts or behaviors.  Summary  · Obesity is the  "condition of having too much total body fat.  · Being overweight or obese means that your weight is greater than what is considered healthy for your body size.  · Work with your health care provider and a dietitian to set a weight-loss goal that is healthy and reasonable for you.  · Exercise regularly, as told by your health care provider. Ask your health care provider what types of exercise are safe for you and how often you should exercise.  This information is not intended to replace advice given to you by your health care provider. Make sure you discuss any questions you have with your health care provider.  Document Released: 01/25/2006 Document Revised: 08/22/2019 Document Reviewed: 08/22/2019  [employer] Patient Education © 2020 [employer]      DASH Eating Plan  DASH stands for \"Dietary Approaches to Stop Hypertension.\" The DASH eating plan is a healthy eating plan that has been shown to reduce high blood pressure (hypertension). It may also reduce your risk for type 2 diabetes, heart disease, and stroke. The DASH eating plan may also help with weight loss.  What are tips for following this plan?    General guidelines  · Avoid eating more than 2,300 mg (milligrams) of salt (sodium) a day. If you have hypertension, you may need to reduce your sodium intake to 1,500 mg a day.  · Limit alcohol intake to no more than 1 drink a day for nonpregnant women and 2 drinks a day for men. One drink equals 12 oz of beer, 5 oz of wine, or 1½ oz of hard liquor.  · Work with your health care provider to maintain a healthy body weight or to lose weight. Ask what an ideal weight is for you.  · Get at least 30 minutes of exercise that causes your heart to beat faster (aerobic exercise) most days of the week. Activities may include walking, swimming, or biking.  · Work with your health care provider or diet and nutrition specialist (dietitian) to adjust your eating plan to your individual calorie needs.  Reading food " "labels    · Check food labels for the amount of sodium per serving. Choose foods with less than 5 percent of the Daily Value of sodium. Generally, foods with less than 300 mg of sodium per serving fit into this eating plan.  · To find whole grains, look for the word \"whole\" as the first word in the ingredient list.  Shopping  · Buy products labeled as \"low-sodium\" or \"no salt added.\"  · Buy fresh foods. Avoid canned foods and premade or frozen meals.  Cooking  · Avoid adding salt when cooking. Use salt-free seasonings or herbs instead of table salt or sea salt. Check with your health care provider or pharmacist before using salt substitutes.  · Do not marcum foods. Cook foods using healthy methods such as baking, boiling, grilling, and broiling instead.  · Cook with heart-healthy oils, such as olive, canola, soybean, or sunflower oil.  Meal planning  · Eat a balanced diet that includes:  ? 5 or more servings of fruits and vegetables each day. At each meal, try to fill half of your plate with fruits and vegetables.  ? Up to 6-8 servings of whole grains each day.  ? Less than 6 oz of lean meat, poultry, or fish each day. A 3-oz serving of meat is about the same size as a deck of cards. One egg equals 1 oz.  ? 2 servings of low-fat dairy each day.  ? A serving of nuts, seeds, or beans 5 times each week.  ? Heart-healthy fats. Healthy fats called Omega-3 fatty acids are found in foods such as flaxseeds and coldwater fish, like sardines, salmon, and mackerel.  · Limit how much you eat of the following:  ? Canned or prepackaged foods.  ? Food that is high in trans fat, such as fried foods.  ? Food that is high in saturated fat, such as fatty meat.  ? Sweets, desserts, sugary drinks, and other foods with added sugar.  ? Full-fat dairy products.  · Do not salt foods before eating.  · Try to eat at least 2 vegetarian meals each week.  · Eat more home-cooked food and less restaurant, buffet, and fast food.  · When eating at a " restaurant, ask that your food be prepared with less salt or no salt, if possible.  What foods are recommended?  The items listed may not be a complete list. Talk with your dietitian about what dietary choices are best for you.  Grains  Whole-grain or whole-wheat bread. Whole-grain or whole-wheat pasta. Brown rice. Oatmeal. Quinoa. Bulgur. Whole-grain and low-sodium cereals. Regine bread. Low-fat, low-sodium crackers. Whole-wheat flour tortillas.  Vegetables  Fresh or frozen vegetables (raw, steamed, roasted, or grilled). Low-sodium or reduced-sodium tomato and vegetable juice. Low-sodium or reduced-sodium tomato sauce and tomato paste. Low-sodium or reduced-sodium canned vegetables.  Fruits  All fresh, dried, or frozen fruit. Canned fruit in natural juice (without added sugar).  Meat and other protein foods  Skinless chicken or turkey. Ground chicken or turkey. Pork with fat trimmed off. Fish and seafood. Egg whites. Dried beans, peas, or lentils. Unsalted nuts, nut butters, and seeds. Unsalted canned beans. Lean cuts of beef with fat trimmed off. Low-sodium, lean deli meat.  Dairy  Low-fat (1%) or fat-free (skim) milk. Fat-free, low-fat, or reduced-fat cheeses. Nonfat, low-sodium ricotta or cottage cheese. Low-fat or nonfat yogurt. Low-fat, low-sodium cheese.  Fats and oils  Soft margarine without trans fats. Vegetable oil. Low-fat, reduced-fat, or light mayonnaise and salad dressings (reduced-sodium). Canola, safflower, olive, soybean, and sunflower oils. Avocado.  Seasoning and other foods  Herbs. Spices. Seasoning mixes without salt. Unsalted popcorn and pretzels. Fat-free sweets.  What foods are not recommended?  The items listed may not be a complete list. Talk with your dietitian about what dietary choices are best for you.  Grains  Baked goods made with fat, such as croissants, muffins, or some breads. Dry pasta or rice meal packs.  Vegetables  Creamed or fried vegetables. Vegetables in a cheese sauce.  Regular canned vegetables (not low-sodium or reduced-sodium). Regular canned tomato sauce and paste (not low-sodium or reduced-sodium). Regular tomato and vegetable juice (not low-sodium or reduced-sodium). Pickles. Olives.  Fruits  Canned fruit in a light or heavy syrup. Fried fruit. Fruit in cream or butter sauce.  Meat and other protein foods  Fatty cuts of meat. Ribs. Fried meat. [place]. Sausage. Bologna and other processed lunch meats. Salami. Fatback. Hotdogs. Bratwurst. Salted nuts and seeds. Canned beans with added salt. Canned or smoked fish. Whole eggs or egg yolks. Chicken or turkey with skin.  Dairy  Whole or 2% milk, cream, and half-and-half. Whole or full-fat cream cheese. Whole-fat or sweetened yogurt. Full-fat cheese. Nondairy creamers. Whipped toppings. Processed cheese and cheese spreads.  Fats and oils  Butter. Stick margarine. Lard. Shortening. Ghee. [place] fat. Tropical oils, such as coconut, palm kernel, or palm oil.  Seasoning and other foods  Salted popcorn and pretzels. Onion salt, garlic salt, seasoned salt, table salt, and sea salt. Worcestershire sauce. Tartar sauce. Barbecue sauce. Teriyaki sauce. Soy sauce, including reduced-sodium. Steak sauce. Canned and packaged gravies. Fish sauce. Oyster sauce. Cocktail sauce. Horseradish that you find on the shelf. Ketchup. Mustard. Meat flavorings and tenderizers. Bouillon cubes. Hot sauce and Tabasco sauce. Premade or packaged marinades. Premade or packaged taco seasonings. Relishes. Regular salad dressings.  Where to find more information:  · National Heart, Lung, and [employer] [place]: www.nhlbi.nih.gov  · American Heart Association: www.heart.org  Summary  · The DASH eating plan is a healthy eating plan that has been shown to reduce high blood pressure (hypertension). It may also reduce your risk for type 2 diabetes, heart disease, and stroke.  · With the DASH eating plan, you should limit salt (sodium) intake to 2,300 mg a day. If you have  "hypertension, you may need to reduce your sodium intake to 1,500 mg a day.  · When on the DASH eating plan, aim to eat more fresh fruits and vegetables, whole grains, lean proteins, low-fat dairy, and heart-healthy fats.  · Work with your health care provider or diet and nutrition specialist (dietitian) to adjust your eating plan to your individual calorie needs.  This information is not intended to replace advice given to you by your health care provider. Make sure you discuss any questions you have with your health care provider.  Document Released: 12/06/2012 Document Revised: 11/30/2018 Document Reviewed: 12/11/2017  [employer] Patient Education © 2020 [employer]      Hypertension, Adult  High blood pressure (hypertension) is when the force of blood pumping through the arteries is too strong. The arteries are the blood vessels that carry blood from the heart throughout the body. Hypertension forces the heart to work harder to pump blood and may cause arteries to become narrow or stiff. Untreated or uncontrolled hypertension can cause a heart attack, heart failure, a stroke, kidney disease, and other problems.  A blood pressure reading consists of a higher number over a lower number. Ideally, your blood pressure should be below 120/80. The first (\"top\") number is called the systolic pressure. It is a measure of the pressure in your arteries as your heart beats. The second (\"bottom\") number is called the diastolic pressure. It is a measure of the pressure in your arteries as the heart relaxes.  What are the causes?  The exact cause of this condition is not known. There are some conditions that result in or are related to high blood pressure.  What increases the risk?  Some risk factors for high blood pressure are under your control. The following factors may make you more likely to develop this condition:  · Smoking.  · Having type 2 diabetes mellitus, high cholesterol, or both.  · Not getting enough exercise or " physical activity.  · Being overweight.  · Having too much fat, sugar, calories, or salt (sodium) in your diet.  · Drinking too much alcohol.  Some risk factors for high blood pressure may be difficult or impossible to change. Some of these factors include:  · Having chronic kidney disease.  · Having a family history of high blood pressure.  · Age. Risk increases with age.  · Race. You may be at higher risk if you are .  · Gender. Men are at higher risk than women before age 45. After age 65, women are at higher risk than men.  · Having obstructive sleep apnea.  · Stress.  What are the signs or symptoms?  High blood pressure may not cause symptoms. Very high blood pressure (hypertensive crisis) may cause:  · Headache.  · Anxiety.  · Shortness of breath.  · Nosebleed.  · Nausea and vomiting.  · Vision changes.  · Severe chest pain.  · Seizures.  How is this diagnosed?  This condition is diagnosed by measuring your blood pressure while you are seated, with your arm resting on a flat surface, your legs uncrossed, and your feet flat on the floor. The cuff of the blood pressure monitor will be placed directly against the skin of your upper arm at the level of your heart. It should be measured at least twice using the same arm. Certain conditions can cause a difference in blood pressure between your right and left arms.  Certain factors can cause blood pressure readings to be lower or higher than normal for a short period of time:  · When your blood pressure is higher when you are in a health care provider's office than when you are at home, this is called white coat hypertension. Most people with this condition do not need medicines.  · When your blood pressure is higher at home than when you are in a health care provider's office, this is called masked hypertension. Most people with this condition may need medicines to control blood pressure.  If you have a high blood pressure reading during one visit or  you have normal blood pressure with other risk factors, you may be asked to:  · Return on a different day to have your blood pressure checked again.  · Monitor your blood pressure at home for 1 week or longer.  If you are diagnosed with hypertension, you may have other blood or imaging tests to help your health care provider understand your overall risk for other conditions.  How is this treated?  This condition is treated by making healthy lifestyle changes, such as eating healthy foods, exercising more, and reducing your alcohol intake. Your health care provider may prescribe medicine if lifestyle changes are not enough to get your blood pressure under control, and if:  · Your systolic blood pressure is above 130.  · Your diastolic blood pressure is above 80.  Your personal target blood pressure may vary depending on your medical conditions, your age, and other factors.  Follow these instructions at home:  Eating and drinking    · Eat a diet that is high in fiber and potassium, and low in sodium, added sugar, and fat. An example eating plan is called the DASH (Dietary Approaches to Stop Hypertension) diet. To eat this way:  ? Eat plenty of fresh fruits and vegetables. Try to fill one half of your plate at each meal with fruits and vegetables.  ? Eat whole grains, such as whole-wheat pasta, brown rice, or whole-grain bread. Fill about one fourth of your plate with whole grains.  ? Eat or drink low-fat dairy products, such as skim milk or low-fat yogurt.  ? Avoid fatty cuts of meat, processed or cured meats, and poultry with skin. Fill about one fourth of your plate with lean proteins, such as fish, chicken without skin, beans, eggs, or tofu.  ? Avoid pre-made and processed foods. These tend to be higher in sodium, added sugar, and fat.  · Reduce your daily sodium intake. Most people with hypertension should eat less than 1,500 mg of sodium a day.  · Do not drink alcohol if:  ? Your health care provider tells you  not to drink.  ? You are pregnant, may be pregnant, or are planning to become pregnant.  · If you drink alcohol:  ? Limit how much you use to:  § 0-1 drink a day for women.  § 0-2 drinks a day for men.  ? Be aware of how much alcohol is in your drink. In the [place] one drink equals one 12 oz bottle of beer (355 mL), one 5 oz glass of wine (148 mL), or one 1½ oz glass of hard liquor (44 mL).  Lifestyle    · Work with your health care provider to maintain a healthy body weight or to lose weight. Ask what an ideal weight is for you.  · Get at least 30 minutes of exercise most days of the week. Activities may include walking, swimming, or biking.  · Include exercise to strengthen your muscles (resistance exercise), such as Pilates or lifting weights, as part of your weekly exercise routine. Try to do these types of exercises for 30 minutes at least 3 days a week.  · Do not use any products that contain nicotine or tobacco, such as cigarettes, e-cigarettes, and chewing tobacco. If you need help quitting, ask your health care provider.  · Monitor your blood pressure at home as told by your health care provider.  · Keep all follow-up visits as told by your health care provider. This is important.  Medicines  · Take over-the-counter and prescription medicines only as told by your health care provider. Follow directions carefully. Blood pressure medicines must be taken as prescribed.  · Do not skip doses of blood pressure medicine. Doing this puts you at risk for problems and can make the medicine less effective.  · Ask your health care provider about side effects or reactions to medicines that you should watch for.  Contact a health care provider if you:  · Think you are having a reaction to a medicine you are taking.  · Have headaches that keep coming back (recurring).  · Feel dizzy.  · Have swelling in your ankles.  · Have trouble with your vision.  Get help right away if you:  · Develop a severe headache or  confusion.  · Have unusual weakness or numbness.  · Feel faint.  · Have severe pain in your chest or abdomen.  · Vomit repeatedly.  · Have trouble breathing.  Summary  · Hypertension is when the force of blood pumping through your arteries is too strong. If this condition is not controlled, it may put you at risk for serious complications.  · Your personal target blood pressure may vary depending on your medical conditions, your age, and other factors. For most people, a normal blood pressure is less than 120/80.  · Hypertension is treated with lifestyle changes, medicines, or a combination of both. Lifestyle changes include losing weight, eating a healthy, low-sodium diet, exercising more, and limiting alcohol.  This information is not intended to replace advice given to you by your health care provider. Make sure you discuss any questions you have with your health care provider.  Document Released: 12/18/2006 Document Revised: 08/28/2019 Document Reviewed: 08/28/2019  [employer] Patient Education © 2020 [employer]

## 2020-11-19 NOTE — PROGRESS NOTES
Subjective cc: DM   Cordell Carreon is a 75 y.o. male who presents via video visit for follow up on DM.     He had a questions about shingles vaccine.      Pt reports his glucose has been running in low 200s.    BP has been well controlled.   No new concerns.     Obesity - trending up   No FH of thyroid CA or personal h/o pancreatitis.   He is still taking statin  No chest pain and compliant with his other medications given his h/o CAD - advised this was very important we control his       Diabetes  He presents for his follow-up diabetic visit. He has type 2 diabetes mellitus. No MedicAlert identification noted. The initial diagnosis of diabetes was made 8 years ago. His disease course has been improving. There are no hypoglycemic associated symptoms. Pertinent negatives for hypoglycemia include no confusion, dizziness, headaches, hunger, mood changes, nervousness/anxiousness, pallor, seizures, sleepiness, speech difficulty, sweats or tremors. Pertinent negatives for diabetes include no blurred vision, no chest pain, no fatigue, no foot paresthesias, no foot ulcerations, no polydipsia, no polyphagia, no polyuria, no visual change, no weakness and no weight loss. There are no hypoglycemic complications. Pertinent negatives for hypoglycemia complications include no blackouts, no hospitalization, no nocturnal hypoglycemia, no required assistance and no required glucagon injection. Symptoms are stable. Diabetic complications include heart disease and nephropathy. Pertinent negatives for diabetic complications include no CVA, impotence, peripheral neuropathy, PVD or retinopathy. Risk factors for coronary artery disease include diabetes mellitus, dyslipidemia, obesity, male sex, hypertension and sedentary lifestyle. Current diabetic treatment includes oral agent (dual therapy) and insulin injections (metformin and glipizide, basglar). He is compliant with treatment all of the time. He is currently taking insulin at  bedtime. Insulin injections are given by patient. Rotation sites for injection include the abdominal wall and thighs. His weight is stable. He is following a generally unhealthy diet. When asked about meal planning, he reported none. He has not had a previous visit with a dietitian. He participates in exercise three times a week. He monitors blood glucose at home 1-2 x per week. He monitors urine at home <1 x per month. Blood glucose monitoring compliance is good. His home blood glucose trend is decreasing steadily. His breakfast blood glucose is taken between 9-10 am. His breakfast blood glucose range is generally >200 mg/dl. His highest blood glucose is >200 mg/dl. His overall blood glucose range is 180-200 mg/dl. An ACE inhibitor/angiotensin II receptor blocker is being taken. He does not see a podiatrist.Eye exam is current (July ).   Coronary Artery Disease  Presents for follow-up visit. Symptoms include weight gain. Pertinent negatives include no chest pain, chest pressure, chest tightness, dizziness, leg swelling, muscle weakness, palpitations or shortness of breath. The symptoms have been stable. Compliance with diet is poor. Compliance with exercise is poor. Compliance with medications is good.   Hypertension  This is a chronic problem. The current episode started more than 1 year ago. The problem is unchanged. The problem is controlled. Pertinent negatives include no anxiety, blurred vision, chest pain, headaches, neck pain, orthopnea, palpitations, peripheral edema, shortness of breath or sweats. Risk factors for coronary artery disease include diabetes mellitus, dyslipidemia, obesity, male gender and sedentary lifestyle. Current antihypertension treatment includes calcium channel blockers, ACE inhibitors and beta blockers. The current treatment provides moderate improvement. Compliance problems include diet and exercise.  Hypertensive end-organ damage includes CAD/MI. There is no history of CVA, PVD or  "retinopathy. Identifiable causes of hypertension include a hypertension causing med.        The following portions of the patient's history were reviewed and updated as appropriate: allergies, current medications, past family history, past medical history, past social history, past surgical history and problem list.        Review of Systems   Constitutional: Positive for weight gain. Negative for activity change, appetite change, chills, fatigue, fever and weight loss.   Eyes: Negative for blurred vision.   Respiratory: Negative for cough, chest tightness and shortness of breath.    Cardiovascular: Negative for chest pain, palpitations, orthopnea and leg swelling.   Gastrointestinal: Negative for abdominal pain, constipation, diarrhea, nausea and vomiting.   Endocrine: Negative for polydipsia, polyphagia and polyuria.   Genitourinary: Negative for impotence.   Musculoskeletal: Negative for muscle weakness and neck pain.   Skin: Negative for pallor.   Neurological: Negative for dizziness, tremors, seizures, speech difficulty, weakness and headaches.   Psychiatric/Behavioral: Negative for confusion. The patient is not nervous/anxious.    All other systems reviewed and are negative.      Objective   Blood pressure 130/76, pulse 52, temperature 97.1 °F (36.2 °C), resp. rate 16, height 172.7 cm (68\"), weight 106 kg (234 lb 3.2 oz), SpO2 97 %.  Physical Exam   Constitutional: He is oriented to person, place, and time. He appears well-developed. No distress.   HENT:   Head: Normocephalic and atraumatic.   Right Ear: External ear normal.   Left Ear: External ear normal.   Nose: Nose normal.   Eyes: Conjunctivae are normal. Right eye exhibits no discharge. Left eye exhibits no discharge.   Neck: Normal range of motion. No tracheal deviation present. No thyromegaly present.   Cardiovascular: Normal rate, regular rhythm, normal heart sounds and normal pulses.   Pulmonary/Chest: Effort normal and breath sounds normal. No " stridor. No respiratory distress. He has no wheezes. He exhibits no tenderness.   Musculoskeletal: Normal range of motion.   Lymphadenopathy:     He has no cervical adenopathy.   Neurological: He is alert and oriented to person, place, and time. He exhibits normal muscle tone. Coordination normal.   Skin: Skin is warm and dry. He is not diaphoretic.   Psychiatric: His behavior is normal. Judgment and thought content normal.   Nursing note and vitals reviewed.      Assessment/Plan   Problems Addressed this Visit        Cardiovascular and Mediastinum    Hyperlipidemia       Digestive    Class 2 severe obesity due to excess calories with serious comorbidity and body mass index (BMI) of 35.0 to 35.9 in adult (CMS/[facility])       Endocrine    Type 2 diabetes mellitus with hyperglycemia, without long-term current use of insulin (CMS/HCC)    Relevant Orders    Comprehensive Metabolic Panel    Hemoglobin A1c    CBC (No Diff)      Other Visit Diagnoses     Essential hypertension    -  Primary    Relevant Orders    CBC (No Diff)    Decreased GFR        Relevant Orders    Comprehensive Metabolic Panel      Diagnoses       Codes Comments    Essential hypertension    -  Primary ICD-10-CM: I10  ICD-9-CM: 401.9     Type 2 diabetes mellitus with hyperglycemia, without long-term current use of insulin (CMS/[facility])     ICD-10-CM: E11.65  ICD-9-CM: 250.00, 790.29     Mixed hyperlipidemia     ICD-10-CM: E78.2  ICD-9-CM: 272.2     Decreased GFR     ICD-10-CM: R94.4  ICD-9-CM: 794.4     Class 2 severe obesity due to excess calories with serious comorbidity and body mass index (BMI) of 35.0 to 35.9 in adult (CMS/[facility])     ICD-10-CM: E66.01, Z68.35  ICD-9-CM: 278.01, V85.35           PLAN:     #1 DM: chronic, uncontrolled with hyperglycemia, labs reviewed - ha1c today, discussed adjusting dose of insulin, continue on current medications, recheck in 3 months, eye exam UTD    #2 HTN: chronic, controlled, continue on current medication     #3 HLD:  chronic, uncontrolled, on statin, advised on lifestyle changes     #4 obesity: Patient's Body mass index is 35.61 kg/m². BMI is above normal parameters. Recommendations include: educational material, exercise counseling and nutrition counseling.               This document has been electronically signed by Pallavi Mtz MD on November 19, 2020 10:58 CST

## 2020-11-20 LAB
ALBUMIN SERPL-MCNC: 4.4 G/DL (ref 3.5–5.2)
ALBUMIN/GLOB SERPL: 1.6 G/DL
ALP SERPL-CCNC: 89 U/L (ref 39–117)
ALT SERPL-CCNC: 44 U/L (ref 1–41)
AST SERPL-CCNC: 37 U/L (ref 1–40)
BILIRUB SERPL-MCNC: 0.7 MG/DL (ref 0–1.2)
BUN SERPL-MCNC: 17 MG/DL (ref 8–23)
BUN/CREAT SERPL: 12.6 (ref 7–25)
CALCIUM SERPL-MCNC: 8.9 MG/DL (ref 8.6–10.5)
CHLORIDE SERPL-SCNC: 102 MMOL/L (ref 98–107)
CO2 SERPL-SCNC: 23.5 MMOL/L (ref 22–29)
CREAT SERPL-MCNC: 1.35 MG/DL (ref 0.76–1.27)
ERYTHROCYTE [DISTWIDTH] IN BLOOD BY AUTOMATED COUNT: 12.9 % (ref 12.3–15.4)
GLOBULIN SER CALC-MCNC: 2.7 GM/DL
GLUCOSE SERPL-MCNC: 176 MG/DL (ref 65–99)
HBA1C MFR BLD: 10 % (ref 4.8–5.6)
HCT VFR BLD AUTO: 42.8 % (ref 37.5–51)
HGB BLD-MCNC: 14.4 G/DL (ref 13–17.7)
MCH RBC QN AUTO: 28.4 PG (ref 26.6–33)
MCHC RBC AUTO-ENTMCNC: 33.6 G/DL (ref 31.5–35.7)
MCV RBC AUTO: 84.4 FL (ref 79–97)
PLATELET # BLD AUTO: 226 10*3/MM3 (ref 140–450)
POTASSIUM SERPL-SCNC: 4.4 MMOL/L (ref 3.5–5.2)
PROT SERPL-MCNC: 7.1 G/DL (ref 6–8.5)
RBC # BLD AUTO: 5.07 10*6/MM3 (ref 4.14–5.8)
SODIUM SERPL-SCNC: 140 MMOL/L (ref 136–145)
WBC # BLD AUTO: 7.54 10*3/MM3 (ref 3.4–10.8)

## 2020-12-06 DIAGNOSIS — E11.9 TYPE 2 DIABETES MELLITUS WITH HEMOGLOBIN A1C GOAL OF LESS THAN 7.0% (HCC): ICD-10-CM

## 2020-12-07 RX ORDER — GLIMEPIRIDE 2 MG/1
TABLET ORAL
Qty: 90 TABLET | Refills: 0 | Status: SHIPPED | OUTPATIENT
Start: 2020-12-07 | End: 2021-02-26

## 2020-12-08 DIAGNOSIS — I10 ESSENTIAL HYPERTENSION: ICD-10-CM

## 2020-12-08 RX ORDER — BENAZEPRIL HYDROCHLORIDE 5 MG/1
TABLET, FILM COATED ORAL
Qty: 90 TABLET | Refills: 0 | Status: SHIPPED | OUTPATIENT
Start: 2020-12-08 | End: 2021-03-02

## 2021-02-25 ENCOUNTER — OFFICE VISIT (OUTPATIENT)
Dept: FAMILY MEDICINE CLINIC | Facility: CLINIC | Age: 76
End: 2021-02-25

## 2021-02-25 VITALS
TEMPERATURE: 97.3 F | RESPIRATION RATE: 16 BRPM | SYSTOLIC BLOOD PRESSURE: 120 MMHG | WEIGHT: 231.8 LBS | BODY MASS INDEX: 35.13 KG/M2 | DIASTOLIC BLOOD PRESSURE: 72 MMHG | HEIGHT: 68 IN | OXYGEN SATURATION: 95 % | HEART RATE: 58 BPM

## 2021-02-25 DIAGNOSIS — E66.01 CLASS 2 SEVERE OBESITY DUE TO EXCESS CALORIES WITH SERIOUS COMORBIDITY AND BODY MASS INDEX (BMI) OF 35.0 TO 35.9 IN ADULT (HCC): ICD-10-CM

## 2021-02-25 DIAGNOSIS — R94.4 DECREASED GFR: ICD-10-CM

## 2021-02-25 DIAGNOSIS — I10 ESSENTIAL HYPERTENSION: ICD-10-CM

## 2021-02-25 DIAGNOSIS — E11.9 TYPE 2 DIABETES MELLITUS WITH HEMOGLOBIN A1C GOAL OF LESS THAN 7.0% (HCC): ICD-10-CM

## 2021-02-25 DIAGNOSIS — E78.2 MIXED HYPERLIPIDEMIA: ICD-10-CM

## 2021-02-25 DIAGNOSIS — E11.65 TYPE 2 DIABETES MELLITUS WITH HYPERGLYCEMIA, WITHOUT LONG-TERM CURRENT USE OF INSULIN (HCC): Primary | ICD-10-CM

## 2021-02-25 PROCEDURE — 99214 OFFICE O/P EST MOD 30 MIN: CPT | Performed by: FAMILY MEDICINE

## 2021-02-26 DIAGNOSIS — E11.65 TYPE 2 DIABETES MELLITUS WITH HYPERGLYCEMIA, WITHOUT LONG-TERM CURRENT USE OF INSULIN (HCC): ICD-10-CM

## 2021-02-26 LAB
ALBUMIN SERPL-MCNC: 4.3 G/DL (ref 3.5–5.2)
ALBUMIN/GLOB SERPL: 1.8 G/DL
ALP SERPL-CCNC: 83 U/L (ref 39–117)
ALT SERPL-CCNC: 36 U/L (ref 1–41)
AST SERPL-CCNC: 31 U/L (ref 1–40)
BILIRUB SERPL-MCNC: 0.6 MG/DL (ref 0–1.2)
BUN SERPL-MCNC: 13 MG/DL (ref 8–23)
BUN/CREAT SERPL: 10.6 (ref 7–25)
CALCIUM SERPL-MCNC: 9.5 MG/DL (ref 8.6–10.5)
CHLORIDE SERPL-SCNC: 104 MMOL/L (ref 98–107)
CO2 SERPL-SCNC: 25.1 MMOL/L (ref 22–29)
CREAT SERPL-MCNC: 1.23 MG/DL (ref 0.76–1.27)
GLOBULIN SER CALC-MCNC: 2.4 GM/DL
GLUCOSE SERPL-MCNC: 192 MG/DL (ref 65–99)
HBA1C MFR BLD: 9.5 % (ref 4.8–5.6)
POTASSIUM SERPL-SCNC: 5 MMOL/L (ref 3.5–5.2)
PROT SERPL-MCNC: 6.7 G/DL (ref 6–8.5)
SODIUM SERPL-SCNC: 139 MMOL/L (ref 136–145)

## 2021-02-26 RX ORDER — GLIMEPIRIDE 2 MG/1
TABLET ORAL
Qty: 90 TABLET | Refills: 0 | Status: SHIPPED | OUTPATIENT
Start: 2021-02-26 | End: 2021-04-08

## 2021-02-26 RX ORDER — INSULIN GLARGINE 100 [IU]/ML
INJECTION, SOLUTION SUBCUTANEOUS
Qty: 27 PEN | Refills: 1 | Status: SHIPPED | OUTPATIENT
Start: 2021-02-26 | End: 2021-08-25 | Stop reason: SDUPTHER

## 2021-02-26 RX ORDER — PEN NEEDLE, DIABETIC 32GX 5/32"
NEEDLE, DISPOSABLE MISCELLANEOUS
Qty: 100 EACH | Refills: 3 | Status: ON HOLD | OUTPATIENT
Start: 2021-02-26 | End: 2021-03-15

## 2021-02-26 NOTE — TELEPHONE ENCOUNTER
Last RX:   Glimepiride- 12.07.2020- Qty 90/ 1 daily/   BP Pen Needle-      Last OV: 02.25.2021    Next OV: 06.01.2021

## 2021-03-02 DIAGNOSIS — I10 ESSENTIAL HYPERTENSION: ICD-10-CM

## 2021-03-02 RX ORDER — BENAZEPRIL HYDROCHLORIDE 5 MG/1
TABLET, FILM COATED ORAL
Qty: 90 TABLET | Refills: 0 | Status: SHIPPED | OUTPATIENT
Start: 2021-03-02 | End: 2021-05-03 | Stop reason: SDUPTHER

## 2021-03-11 NOTE — PROGRESS NOTES
Subjective cc: DM   Cordell Carreon is a 75 y.o. male who presents for follow up on DM.      Pt reports his glucose is still uncontrolled.     BP has been well controlled.   No new concerns.     Obesity - unchanged   No FH of thyroid CA or personal h/o pancreatitis.   He is still taking statin  No chest pain and compliant with his other medications given his h/o CAD - advised this was very important we control his       Diabetes  He presents for his follow-up diabetic visit. He has type 2 diabetes mellitus. No MedicAlert identification noted. The initial diagnosis of diabetes was made 8 years ago. His disease course has been stable. There are no hypoglycemic associated symptoms. Pertinent negatives for hypoglycemia include no confusion, dizziness, headaches, hunger, mood changes, nervousness/anxiousness, pallor, seizures, sleepiness, speech difficulty, sweats or tremors. Pertinent negatives for diabetes include no blurred vision, no chest pain, no fatigue, no foot paresthesias, no foot ulcerations, no polydipsia, no polyphagia, no polyuria, no visual change, no weakness and no weight loss. There are no hypoglycemic complications. Pertinent negatives for hypoglycemia complications include no blackouts, no hospitalization, no nocturnal hypoglycemia, no required assistance and no required glucagon injection. Symptoms are stable. Diabetic complications include heart disease and nephropathy. Pertinent negatives for diabetic complications include no CVA, impotence, peripheral neuropathy, PVD or retinopathy. Risk factors for coronary artery disease include diabetes mellitus, dyslipidemia, obesity, male sex, hypertension and sedentary lifestyle. Current diabetic treatment includes oral agent (dual therapy) and insulin injections (metformin and glipizide, basglar). He is compliant with treatment all of the time. He is currently taking insulin at bedtime. Insulin injections are given by patient. Rotation sites for injection  include the abdominal wall and thighs. His weight is stable. He is following a generally unhealthy diet. When asked about meal planning, he reported none. He has not had a previous visit with a dietitian. He participates in exercise three times a week. He monitors blood glucose at home 1-2 x per week. He monitors urine at home <1 x per month. Blood glucose monitoring compliance is good. His home blood glucose trend is decreasing steadily. His breakfast blood glucose is taken between 9-10 am. His breakfast blood glucose range is generally >200 mg/dl. His overall blood glucose range is 180-200 mg/dl. An ACE inhibitor/angiotensin II receptor blocker is being taken. He does not see a podiatrist.Eye exam is current (July ).   Coronary Artery Disease  Presents for follow-up visit. Pertinent negatives include no chest pain, chest pressure, chest tightness, dizziness, leg swelling, muscle weakness, palpitations, shortness of breath or weight gain. The symptoms have been stable. Compliance with diet is poor. Compliance with exercise is poor. Compliance with medications is good.   Hypertension  This is a chronic problem. The current episode started more than 1 year ago. The problem is unchanged. The problem is controlled. Pertinent negatives include no anxiety, blurred vision, chest pain, headaches, neck pain, orthopnea, palpitations, peripheral edema, shortness of breath or sweats. Risk factors for coronary artery disease include diabetes mellitus, dyslipidemia, obesity, male gender and sedentary lifestyle. Current antihypertension treatment includes calcium channel blockers, ACE inhibitors and beta blockers. The current treatment provides moderate improvement. Compliance problems include diet and exercise.  Hypertensive end-organ damage includes CAD/MI. There is no history of CVA, PVD or retinopathy. Identifiable causes of hypertension include a hypertension causing med.        The following portions of the patient's  "history were reviewed and updated as appropriate: allergies, current medications, past family history, past medical history, past social history, past surgical history and problem list.        Review of Systems   Constitutional: Negative for activity change, appetite change, chills, fatigue, fever, weight gain and weight loss.   Eyes: Negative for blurred vision.   Respiratory: Negative for cough, chest tightness and shortness of breath.    Cardiovascular: Negative for chest pain, palpitations, orthopnea and leg swelling.   Gastrointestinal: Negative for abdominal pain, constipation, diarrhea, nausea and vomiting.   Endocrine: Negative for polydipsia, polyphagia and polyuria.   Genitourinary: Negative for impotence.   Musculoskeletal: Negative for muscle weakness and neck pain.   Skin: Negative for pallor.   Neurological: Negative for dizziness, tremors, seizures, speech difficulty, weakness and headaches.   Psychiatric/Behavioral: Negative for confusion. The patient is not nervous/anxious.    All other systems reviewed and are negative.      Objective   Blood pressure 120/72, pulse 58, temperature 97.3 °F (36.3 °C), temperature source Infrared, resp. rate 16, height 172.7 cm (68\"), weight 105 kg (231 lb 12.8 oz), SpO2 95 %.  Physical Exam   Constitutional: He is oriented to person, place, and time. He appears well-developed. No distress.   HENT:   Head: Normocephalic and atraumatic.   Right Ear: External ear normal.   Left Ear: External ear normal.   Nose: Nose normal.   Eyes: Conjunctivae are normal. Right eye exhibits no discharge. Left eye exhibits no discharge.   Neck: No tracheal deviation present. No thyromegaly present.   Cardiovascular: Normal rate, regular rhythm, normal heart sounds and normal pulses.   Pulmonary/Chest: Effort normal and breath sounds normal. No stridor. No respiratory distress. He has no wheezes. He exhibits no tenderness.   Abdominal: Soft. He exhibits no distension. There is no " abdominal tenderness.   Musculoskeletal: Normal range of motion.   Lymphadenopathy:     He has no cervical adenopathy.   Neurological: He is alert and oriented to person, place, and time. He exhibits normal muscle tone. Coordination normal.   Skin: Skin is warm and dry. He is not diaphoretic.   Psychiatric: His behavior is normal. Judgment and thought content normal.   Nursing note and vitals reviewed.      Assessment/Plan   Problems Addressed this Visit        Cardiac and Vasculature    Hyperlipidemia       Endocrine and Metabolic    Type 2 diabetes mellitus with hyperglycemia, without long-term current use of insulin (CMS/[facility]) - Primary    Relevant Orders    Hemoglobin A1c (Completed)    Class 2 severe obesity due to excess calories with serious comorbidity and body mass index (BMI) of 35.0 to 35.9 in adult (CMS/[facility])      Other Visit Diagnoses     Essential hypertension        Decreased GFR        Relevant Orders    Comprehensive Metabolic Panel (Completed)      Diagnoses       Codes Comments    Type 2 diabetes mellitus with hyperglycemia, without long-term current use of insulin (CMS/[facility])    -  Primary ICD-10-CM: E11.65  ICD-9-CM: 250.00, 790.29     Essential hypertension     ICD-10-CM: I10  ICD-9-CM: 401.9     Decreased GFR     ICD-10-CM: R94.4  ICD-9-CM: 794.4     Class 2 severe obesity due to excess calories with serious comorbidity and body mass index (BMI) of 35.0 to 35.9 in adult (CMS/[facility])     ICD-10-CM: E66.01, Z68.35  ICD-9-CM: 278.01, V85.35     Mixed hyperlipidemia     ICD-10-CM: E78.2  ICD-9-CM: 272.2           PLAN:     #1 DM: chronic, uncontrolled with hyperglycemia, labs reviewed - ha1c today, discussed adjusting dose of insulin, continue on current medications, recheck in 3 months, eye exam UTD    #2 HTN: chronic, controlled, continue on current medication     #3 HLD: chronic, uncontrolled, on statin, advised on lifestyle changes     #4 obesity: Patient's Body mass index is 35.25 kg/m². BMI is  above normal parameters. Recommendations include: educational material, exercise counseling and nutrition counseling.     #5 CKD: chronic, stable, avoid nephrotoxins, will monitor with labs          This document has been electronically signed by Pallavi Mtz MD on March 11, 2021 09:30 CST

## 2021-03-13 ENCOUNTER — APPOINTMENT (OUTPATIENT)
Dept: GENERAL RADIOLOGY | Facility: HOSPITAL | Age: 76
End: 2021-03-13

## 2021-03-13 ENCOUNTER — HOSPITAL ENCOUNTER (INPATIENT)
Facility: HOSPITAL | Age: 76
LOS: 3 days | Discharge: HOME | End: 2021-03-16
Attending: FAMILY MEDICINE | Admitting: INTERNAL MEDICINE

## 2021-03-13 ENCOUNTER — APPOINTMENT (OUTPATIENT)
Dept: CT IMAGING | Facility: HOSPITAL | Age: 76
End: 2021-03-13

## 2021-03-13 DIAGNOSIS — R09.02 HYPOXEMIA: ICD-10-CM

## 2021-03-13 DIAGNOSIS — I26.99 BILATERAL PULMONARY EMBOLISM (HCC): Primary | ICD-10-CM

## 2021-03-13 PROBLEM — Z79.4 TYPE 2 DIABETES MELLITUS WITH HYPERGLYCEMIA, WITH LONG-TERM CURRENT USE OF INSULIN: Status: ACTIVE | Noted: 2017-03-01

## 2021-03-13 PROBLEM — I26.09 PULMONARY EMBOLISM WITH ACUTE COR PULMONALE (HCC): Status: ACTIVE | Noted: 2021-03-13

## 2021-03-13 LAB
ALBUMIN SERPL-MCNC: 4.3 G/DL (ref 3.5–5.2)
ALBUMIN/GLOB SERPL: 1.3 G/DL
ALP SERPL-CCNC: 103 U/L (ref 39–117)
ALT SERPL W P-5'-P-CCNC: 40 U/L (ref 1–41)
ANION GAP SERPL CALCULATED.3IONS-SCNC: 12 MMOL/L (ref 5–15)
ARTERIAL PATENCY WRIST A: POSITIVE
AST SERPL-CCNC: 38 U/L (ref 1–40)
ATMOSPHERIC PRESS: 759 MMHG
BASE EXCESS BLDA CALC-SCNC: -5.4 MMOL/L (ref 0–2)
BASOPHILS # BLD AUTO: 0.07 10*3/MM3 (ref 0–0.2)
BASOPHILS NFR BLD AUTO: 0.7 % (ref 0–1.5)
BDY SITE: ABNORMAL
BILIRUB SERPL-MCNC: 1 MG/DL (ref 0–1.2)
BODY TEMPERATURE: 37 C
BUN SERPL-MCNC: 14 MG/DL (ref 8–23)
BUN/CREAT SERPL: 11 (ref 7–25)
CALCIUM SPEC-SCNC: 9.4 MG/DL (ref 8.6–10.5)
CHLORIDE SERPL-SCNC: 103 MMOL/L (ref 98–107)
CHOLEST SERPL-MCNC: 233 MG/DL (ref 0–200)
CO2 SERPL-SCNC: 24 MMOL/L (ref 22–29)
CREAT SERPL-MCNC: 1.27 MG/DL (ref 0.76–1.27)
D DIMER PPP FEU-MCNC: >20 MG/L (FEU) (ref 0–0.5)
DEPRECATED RDW RBC AUTO: 40.5 FL (ref 37–54)
EOSINOPHIL # BLD AUTO: 0.31 10*3/MM3 (ref 0–0.4)
EOSINOPHIL NFR BLD AUTO: 3 % (ref 0.3–6.2)
ERYTHROCYTE [DISTWIDTH] IN BLOOD BY AUTOMATED COUNT: 13.6 % (ref 12.3–15.4)
GAS FLOW AIRWAY: 2 LPM
GFR SERPL CREATININE-BSD FRML MDRD: 55 ML/MIN/1.73
GLOBULIN UR ELPH-MCNC: 3.3 GM/DL
GLUCOSE BLDC GLUCOMTR-MCNC: 160 MG/DL (ref 70–130)
GLUCOSE BLDC GLUCOMTR-MCNC: 181 MG/DL (ref 70–130)
GLUCOSE SERPL-MCNC: 274 MG/DL (ref 65–99)
HBA1C MFR BLD: 8.7 % (ref 4.8–5.6)
HCO3 BLDA-SCNC: 19.7 MMOL/L (ref 20–26)
HCT VFR BLD AUTO: 42.4 % (ref 37.5–51)
HDLC SERPL-MCNC: 31 MG/DL (ref 40–60)
HGB BLD-MCNC: 14.3 G/DL (ref 13–17.7)
HOLD SPECIMEN: NORMAL
HOLD SPECIMEN: NORMAL
IMM GRANULOCYTES # BLD AUTO: 0.05 10*3/MM3 (ref 0–0.05)
IMM GRANULOCYTES NFR BLD AUTO: 0.5 % (ref 0–0.5)
LDLC SERPL CALC-MCNC: 155 MG/DL (ref 0–100)
LDLC/HDLC SERPL: 4.88 {RATIO}
LYMPHOCYTES # BLD AUTO: 2.18 10*3/MM3 (ref 0.7–3.1)
LYMPHOCYTES NFR BLD AUTO: 20.8 % (ref 19.6–45.3)
Lab: ABNORMAL
MCH RBC QN AUTO: 28 PG (ref 26.6–33)
MCHC RBC AUTO-ENTMCNC: 33.7 G/DL (ref 31.5–35.7)
MCV RBC AUTO: 83 FL (ref 79–97)
MODALITY: ABNORMAL
MONOCYTES # BLD AUTO: 0.93 10*3/MM3 (ref 0.1–0.9)
MONOCYTES NFR BLD AUTO: 8.9 % (ref 5–12)
NEUTROPHILS NFR BLD AUTO: 6.96 10*3/MM3 (ref 1.7–7)
NEUTROPHILS NFR BLD AUTO: 66.1 % (ref 42.7–76)
NRBC BLD AUTO-RTO: 0 /100 WBC (ref 0–0.2)
NT-PROBNP SERPL-MCNC: 199 PG/ML (ref 0–900)
NT-PROBNP SERPL-MCNC: 374.7 PG/ML (ref 0–900)
PCO2 BLDA: 36.7 MM HG (ref 35–45)
PCO2 TEMP ADJ BLD: 36.7 MM HG (ref 35–45)
PH BLDA: 7.34 PH UNITS (ref 7.35–7.45)
PH, TEMP CORRECTED: 7.34 PH UNITS (ref 7.35–7.45)
PLATELET # BLD AUTO: 170 10*3/MM3 (ref 140–450)
PMV BLD AUTO: 9.5 FL (ref 6–12)
PO2 BLDA: 77.7 MM HG (ref 83–108)
PO2 TEMP ADJ BLD: 77.7 MM HG (ref 83–108)
POTASSIUM SERPL-SCNC: 4.9 MMOL/L (ref 3.5–5.2)
PROT SERPL-MCNC: 7.6 G/DL (ref 6–8.5)
RBC # BLD AUTO: 5.11 10*6/MM3 (ref 4.14–5.8)
SAO2 % BLDCOA: 95.1 % (ref 94–99)
SARS-COV-2 RNA PNL SPEC NAA+PROBE: NOT DETECTED
SODIUM SERPL-SCNC: 139 MMOL/L (ref 136–145)
TRIGL SERPL-MCNC: 254 MG/DL (ref 0–150)
TROPONIN T SERPL-MCNC: 0.02 NG/ML (ref 0–0.03)
TROPONIN T SERPL-MCNC: 0.03 NG/ML (ref 0–0.03)
VENTILATOR MODE: ABNORMAL
VLDLC SERPL-MCNC: 47 MG/DL (ref 5–40)
WBC # BLD AUTO: 10.5 10*3/MM3 (ref 3.4–10.8)
WHOLE BLOOD HOLD SPECIMEN: NORMAL
WHOLE BLOOD HOLD SPECIMEN: NORMAL

## 2021-03-13 PROCEDURE — 36600 WITHDRAWAL OF ARTERIAL BLOOD: CPT

## 2021-03-13 PROCEDURE — 71275 CT ANGIOGRAPHY CHEST: CPT

## 2021-03-13 PROCEDURE — 99285 EMERGENCY DEPT VISIT HI MDM: CPT

## 2021-03-13 PROCEDURE — 85025 COMPLETE CBC W/AUTO DIFF WBC: CPT | Performed by: FAMILY MEDICINE

## 2021-03-13 PROCEDURE — 93010 ELECTROCARDIOGRAM REPORT: CPT | Performed by: INTERNAL MEDICINE

## 2021-03-13 PROCEDURE — 83036 HEMOGLOBIN GLYCOSYLATED A1C: CPT | Performed by: NURSE PRACTITIONER

## 2021-03-13 PROCEDURE — 0 IOPAMIDOL PER 1 ML: Performed by: FAMILY MEDICINE

## 2021-03-13 PROCEDURE — 80053 COMPREHEN METABOLIC PANEL: CPT | Performed by: FAMILY MEDICINE

## 2021-03-13 PROCEDURE — 84484 ASSAY OF TROPONIN QUANT: CPT | Performed by: FAMILY MEDICINE

## 2021-03-13 PROCEDURE — 83880 ASSAY OF NATRIURETIC PEPTIDE: CPT | Performed by: FAMILY MEDICINE

## 2021-03-13 PROCEDURE — 85379 FIBRIN DEGRADATION QUANT: CPT | Performed by: FAMILY MEDICINE

## 2021-03-13 PROCEDURE — 71045 X-RAY EXAM CHEST 1 VIEW: CPT

## 2021-03-13 PROCEDURE — 83880 ASSAY OF NATRIURETIC PEPTIDE: CPT | Performed by: INTERNAL MEDICINE

## 2021-03-13 PROCEDURE — 82803 BLOOD GASES ANY COMBINATION: CPT

## 2021-03-13 PROCEDURE — 63710000001 INSULIN LISPRO (HUMAN) PER 5 UNITS: Performed by: NURSE PRACTITIONER

## 2021-03-13 PROCEDURE — 25010000002 ENOXAPARIN PER 10 MG: Performed by: FAMILY MEDICINE

## 2021-03-13 PROCEDURE — 36415 COLL VENOUS BLD VENIPUNCTURE: CPT

## 2021-03-13 PROCEDURE — 82962 GLUCOSE BLOOD TEST: CPT

## 2021-03-13 PROCEDURE — 84484 ASSAY OF TROPONIN QUANT: CPT | Performed by: INTERNAL MEDICINE

## 2021-03-13 PROCEDURE — 87635 SARS-COV-2 COVID-19 AMP PRB: CPT | Performed by: FAMILY MEDICINE

## 2021-03-13 PROCEDURE — 93005 ELECTROCARDIOGRAM TRACING: CPT | Performed by: FAMILY MEDICINE

## 2021-03-13 PROCEDURE — 80061 LIPID PANEL: CPT | Performed by: NURSE PRACTITIONER

## 2021-03-13 RX ORDER — ONDANSETRON 2 MG/ML
4 INJECTION INTRAMUSCULAR; INTRAVENOUS EVERY 6 HOURS PRN
Status: DISCONTINUED | OUTPATIENT
Start: 2021-03-13 | End: 2021-03-16 | Stop reason: HOSPADM

## 2021-03-13 RX ORDER — DEXTROSE MONOHYDRATE 25 G/50ML
25 INJECTION, SOLUTION INTRAVENOUS
Status: DISCONTINUED | OUTPATIENT
Start: 2021-03-13 | End: 2021-03-16 | Stop reason: HOSPADM

## 2021-03-13 RX ORDER — NICOTINE POLACRILEX 4 MG
15 LOZENGE BUCCAL
Status: DISCONTINUED | OUTPATIENT
Start: 2021-03-13 | End: 2021-03-16 | Stop reason: HOSPADM

## 2021-03-13 RX ORDER — ATORVASTATIN CALCIUM 40 MG/1
80 TABLET, FILM COATED ORAL NIGHTLY
Status: DISCONTINUED | OUTPATIENT
Start: 2021-03-13 | End: 2021-03-16 | Stop reason: HOSPADM

## 2021-03-13 RX ORDER — SODIUM CHLORIDE 0.9 % (FLUSH) 0.9 %
10 SYRINGE (ML) INJECTION AS NEEDED
Status: DISCONTINUED | OUTPATIENT
Start: 2021-03-13 | End: 2021-03-16 | Stop reason: HOSPADM

## 2021-03-13 RX ORDER — METOPROLOL TARTRATE 50 MG/1
50 TABLET, FILM COATED ORAL EVERY 12 HOURS
Status: DISCONTINUED | OUTPATIENT
Start: 2021-03-13 | End: 2021-03-16 | Stop reason: HOSPADM

## 2021-03-13 RX ORDER — HYDROCODONE BITARTRATE AND ACETAMINOPHEN 5; 325 MG/1; MG/1
1 TABLET ORAL EVERY 4 HOURS PRN
Status: DISCONTINUED | OUTPATIENT
Start: 2021-03-13 | End: 2021-03-16 | Stop reason: HOSPADM

## 2021-03-13 RX ORDER — SODIUM CHLORIDE 0.9 % (FLUSH) 0.9 %
10 SYRINGE (ML) INJECTION AS NEEDED
Status: DISCONTINUED | OUTPATIENT
Start: 2021-03-13 | End: 2021-03-13

## 2021-03-13 RX ORDER — ACETAMINOPHEN 325 MG/1
650 TABLET ORAL EVERY 4 HOURS PRN
Status: DISCONTINUED | OUTPATIENT
Start: 2021-03-13 | End: 2021-03-16 | Stop reason: HOSPADM

## 2021-03-13 RX ORDER — SODIUM CHLORIDE 0.9 % (FLUSH) 0.9 %
10 SYRINGE (ML) INJECTION EVERY 12 HOURS SCHEDULED
Status: DISCONTINUED | OUTPATIENT
Start: 2021-03-13 | End: 2021-03-16 | Stop reason: HOSPADM

## 2021-03-13 RX ADMIN — INSULIN LISPRO 2 UNITS: 100 INJECTION, SOLUTION INTRAVENOUS; SUBCUTANEOUS at 18:14

## 2021-03-13 RX ADMIN — METOPROLOL TARTRATE 50 MG: 50 TABLET ORAL at 20:54

## 2021-03-13 RX ADMIN — SODIUM CHLORIDE, PRESERVATIVE FREE 10 ML: 5 INJECTION INTRAVENOUS at 20:54

## 2021-03-13 RX ADMIN — ENOXAPARIN SODIUM 110 MG: 120 INJECTION SUBCUTANEOUS at 15:35

## 2021-03-13 RX ADMIN — IOPAMIDOL 100 ML: 755 INJECTION, SOLUTION INTRAVENOUS at 15:03

## 2021-03-13 RX ADMIN — ATORVASTATIN CALCIUM 80 MG: 40 TABLET, FILM COATED ORAL at 20:53

## 2021-03-13 NOTE — ED PROVIDER NOTES
Subjective   This patient is a 75-year-old gentleman with a known history of coronary artery disease (he had a CABG back in 2008), hypertension, dyslipidemia and diabetes.  He has no known history of previous blood clots.  He presents today because he woke up this morning and shortly after developed shortness of breath and on and off chest discomfort which she locates substernally without radiation.  He denies fever or new cough.  He has chronic lower extremity edema but has not noticed any exacerbation recently.  He has not noted any overt weight gain recently.  He had no hemoptysis.  The chest discomfort was not accompanied by diaphoresis.          Review of Systems   Respiratory: Positive for chest tightness and shortness of breath.    All other systems reviewed and are negative.      Past Medical History:   Diagnosis Date   • Class 2 severe obesity due to excess calories with serious comorbidity and body mass index (BMI) of 35.0 to 35.9 in adult (CMS/[facility]) 10/24/2019   • Diabetes mellitus (CMS/[facility])    • Hyperlipidemia    • Hypertension        No Known Allergies    Past Surgical History:   Procedure Laterality Date   • CARDIAC SURGERY     • CATARACT EXTRACTION  7/5/17, 7/28/17   • CORONARY ARTERY BYPASS GRAFT  2008       Family History   Problem Relation Age of Onset   • Dementia Mother    • Heart attack Father    • Ovarian cancer Sister 62   • No Known Problems Daughter    • No Known Problems Son    • No Known Problems Maternal Grandmother    • Heart attack Maternal Grandfather    • No Known Problems Paternal Grandmother    • No Known Problems Paternal Grandfather        Social History     Socioeconomic History   • Marital status:      Spouse name: Not on file   • Number of children: Not on file   • Years of education: Not on file   • Highest education level: Not on file   Tobacco Use   • Smoking status: Never Smoker   • Smokeless tobacco: Never Used   Substance and Sexual Activity   • Alcohol use: Not  Currently   • Drug use: No   • Sexual activity: Defer           Objective   Physical Exam  Vitals and nursing note reviewed.   Constitutional:       Appearance: He is well-developed.   HENT:      Head: Normocephalic and atraumatic.      Right Ear: External ear normal.      Left Ear: External ear normal.      Nose: Nose normal.   Eyes:      Conjunctiva/sclera: Conjunctivae normal.   Cardiovascular:      Rate and Rhythm: Normal rate and regular rhythm.      Heart sounds: Normal heart sounds.   Pulmonary:      Effort: Tachypnea present.      Breath sounds: Normal breath sounds.      Comments: The patient becomes markedly short of breath with any effort, for example getting out of bed and onto her wheelchair to go to CT scan.  Abdominal:      General: Bowel sounds are normal.      Palpations: Abdomen is soft.   Musculoskeletal:         General: Normal range of motion.      Cervical back: Normal range of motion and neck supple.   Skin:     General: Skin is warm and dry.      Capillary Refill: Capillary refill takes less than 2 seconds.   Neurological:      Mental Status: He is alert and oriented to person, place, and time.   Psychiatric:         Behavior: Behavior normal.         Thought Content: Thought content normal.         Judgment: Judgment normal.         Procedures           ED Course                                         PESI (for pulmonary embolism severity) reviewed and/or performed as part of the patient evaluation and treatment planning process.  The result associated with this review/performance is: 104       MDM  Number of Diagnoses or Management Options     Amount and/or Complexity of Data Reviewed  Clinical lab tests: reviewed and ordered  Tests in the radiology section of CPT®: reviewed and ordered  Tests in the medicine section of CPT®: reviewed and ordered    Patient Progress  Patient progress: stable      Final diagnoses:   Bilateral pulmonary embolism (CMS/HCC)   Hypoxemia     The patient's  work-up revealed extensive PEs bilaterally.  I discussed the case with Dr. Perez who was comfortable that the patient did not need immediate intervention.  Lovenox was started in the ED.  I discussed the case with Dr. Irizarry who kindly agreed to admit the patient to the ICU.  The patient's vital signs remained stable except for as hypoxemia which was corrected by nasal cannula oxygen.       Lionel Luque MD  03/13/21 [id number]

## 2021-03-13 NOTE — PLAN OF CARE
Goal Outcome Evaluation:  Pt admitted for bilateral PE. Pt woke up this morning acutely short of breath. Hypoxic to 89% and was placed on 3L NC. Chest CT was done and evidence of bilateral PE in the main right and left pulmonary arteries was found. Lovenox given in the ED. Slight right heart strain present as well. Pt is on 4 L NC, heart rate 70's to 80's. BP stable as well. Pt oriented. Will continue to monitor.

## 2021-03-13 NOTE — H&P
[facility] Medicine Services  HISTORY AND PHYSICAL    Date of Admission: 3/13/2021  Primary Care Physician: Pallavi Mtz MD    Subjective     Chief Complaint: Shortness of breath    History of Present Illness  Mr. Carreon is a 75-year-old  male with a past medical history significant for coronary artery bypass grafting 2008, essential hypertension, hyperlipidemia, diabetes mellitus type 2 insulin-dependent.  His primary care provider is Dr. Pallavi Mtz.  He presented to the emergency room today with complaints of shortness of breath.  Patient reports he awoke this morning and shortly after awakening he noted sudden onset of shortness of breath at the time of breakfast.  He experienced some chest tightness.  The shortness of breath lasted for 2 hours.  He denied radiation of chest pain.  He denied nausea, vomiting or abdominal pain.  Denied palpitations or diaphoresis.  Patient reports mild chronic edema in the feet but no recent weight gain or increased lower extremity edema.  He denies any recent sick contact or recent  travel.  He denies any previous history of blood clots and no family history of blood clots.  He does report having a cold or the flu approximately 1 month ago and was tested Covid negative.  In fact, patient reports he has had 4 negative Covid testing.  Reports he is very active up until Covid restrictions.    PO2 89 on room air he was placed on 2 L.  Currently saturation 93%.  PO2 77 on cannula at 2 L.  D-dimer greater than 20, glucose 274, creatinine 1.27, troponin negative, proBNP 199.  Chest x-ray reported mild central vascular congestion with no evidence of overt CHF.  No consolidating infiltrates.  CT scan of the chest reported extensive acute pulmonary emboli involving the right and left main pulmonary arteries, first, second and third order pulmonary artery branches bilaterally, with evidence of right heart strain.  No saddle embolus  identified.  Dr. Luque, ER physician discussed case with Dr. Blank who noted patient did not need immediate intervention.    Lovenox 1 mg/kg given in ER.    Review of Systems   Constitutional: Negative for appetite change, chills, diaphoresis, fatigue, fever and unexpected weight change.   HENT: Negative for congestion and trouble swallowing.    Eyes: Negative for photophobia and visual disturbance.   Respiratory: Positive for chest tightness and shortness of breath. Negative for cough and wheezing.    Cardiovascular: Positive for leg swelling (Ankle swelling, chronic, unchanged). Negative for chest pain.   Gastrointestinal: Negative for abdominal distention, diarrhea, nausea and vomiting.   Endocrine: Negative for cold intolerance, heat intolerance and polyuria.   Genitourinary: Negative for dysuria, frequency and urgency.   Musculoskeletal: Negative for back pain and gait problem.   Skin: Negative for color change, pallor, rash and wound.   Allergic/Immunologic: Negative for immunocompromised state.   Neurological: Negative for weakness.   Hematological: Negative for adenopathy. Does not bruise/bleed easily.   Psychiatric/Behavioral: Negative for agitation, behavioral problems and confusion.      Otherwise complete ROS reviewed and negative except as mentioned in the HPI.    Past Medical History:   Past Medical History:   Diagnosis Date   • Class 2 severe obesity due to excess calories with serious comorbidity and body mass index (BMI) of 35.0 to 35.9 in adult (CMS/[facility]) 10/24/2019   • Diabetes mellitus (CMS/[facility])    • Hyperlipidemia    • Hypertension      Past Surgical History:  Past Surgical History:   Procedure Laterality Date   • CARDIAC SURGERY     • CATARACT EXTRACTION  7/5/17, 7/28/17   • CORONARY ARTERY BYPASS GRAFT  2008     Social History:  reports that he has never smoked. He has never used smokeless tobacco. He reports previous alcohol use. He reports that he does not use drugs.  He is .  He has  1 son and 1 daughter.  Denies tobacco, alcohol, or illicit drug use.  Sabianist preference Restorationist.  Education Masters degree.   at [employer].  He has a living will.  His wife is his power of .  He is a full code.    Family History: family history includes Dementia in his mother; Heart attack in his father and maternal grandfather; No Known Problems in his daughter, maternal grandmother, paternal grandfather, paternal grandmother, and son; Ovarian cancer (age of onset: 62) in his sister.       Allergies:  No Known Allergies  Medications:  Prior to Admission medications    Medication Sig Start Date End Date Taking? Authorizing Provider   amLODIPine-benazepril (LOTREL) 10-20 MG per capsule TAKE 1 CAPSULE BY MOUTH EVERY DAY 10/6/20   Pallavi Mtz MD   aspirin 81 MG EC tablet Take 81 mg by mouth Daily.    Provider, MD Alexis   atorvastatin (LIPITOR) 80 MG tablet TAKE 1 TABLET BY MOUTH EVERY DAY 10/6/20   Pallavi Mtz MD   BD Pen Needle Eliza U/F 32G X 4 MM misc USE TO INJECT ONCE DAILY 2/26/21   Clarice Marroquin APRN   benazepril (LOTENSIN) 5 MG tablet TAKE 1 TABLET BY MOUTH EVERY DAY 3/2/21   Pallavi Mtz MD   glimepiride (AMARYL) 2 MG tablet TAKE 1 TABLET BY MOUTH EVERY DAY BEFORE BREAKFAST 2/26/21   Clarice Marroquin APRN   glucose blood (ONE TOUCH ULTRA TEST) test strip Test once daily. 6/2/17   Domenico Hui MD   Insulin Glargine (BASAGLAR KWIKPEN) 100 UNIT/ML injection pen INJECT 30 UNITS UNDER THE SKIN INTO THE APPROPRIATE AREA AS DIRECTED EVERY NIGHT. 2/26/21   Clarice Marroquin APRN   metFORMIN (GLUCOPHAGE) 1000 MG tablet TAKE 1 TABLET BY MOUTH TWICE A DAY WITH MEALS 10/29/20   Clarice Marroquin APRN   metoprolol tartrate (LOPRESSOR) 50 MG tablet TAKE 1 TABLET BY MOUTH EVERY 12 HOURS 10/2/20   Pallavi Mtz MD   naproxen sodium (ALEVE) 220 MG tablet Take 220 mg by mouth Daily.    Provider, Historical, MD   ONETOUCH DELICA  "LANCETS FINE misc Test once daily. 6/2/17   Domenico Hui MD     Objective     Vital Signs: /77   Pulse 77   Temp 97.6 °F (36.4 °C) (Oral)   Resp 19   Ht 172.7 cm (68\")   Wt 108 kg (238 lb)   SpO2 93%   BMI 36.19 kg/m²   Physical Exam  Vitals and nursing note reviewed.   Constitutional:       Appearance: He is obese.      Comments: Sitting up on stretcher.  Oxygen 2 L.   HENT:      Head: Normocephalic and atraumatic.      Nose: No congestion.      Mouth/Throat:      Pharynx: No oropharyngeal exudate.   Eyes:      Extraocular Movements: Extraocular movements intact.      Pupils: Pupils are equal, round, and reactive to light.   Cardiovascular:      Rate and Rhythm: Normal rate and regular rhythm.      Heart sounds: No murmur.      Comments: Normal sinus rhythm 79 on bedside monitor  Pulmonary:      Comments: Oxygen 2 L.  Mild conversational shortness of breath  Abdominal:      Palpations: Abdomen is soft.   Genitourinary:     Comments: Voiding.  Musculoskeletal:         General: No swelling or tenderness.      Cervical back: Normal range of motion and neck supple.   Skin:     General: Skin is warm and dry.   Neurological:      General: No focal deficit present.      Mental Status: He is alert and oriented to person, place, and time.   Psychiatric:         Mood and Affect: Mood normal.         Behavior: Behavior normal.         Thought Content: Thought content normal.         Judgment: Judgment normal.        Results Reviewed:  Lab Results (last 24 hours)     Procedure Component Value Units Date/Time    [place] Draw [[id number]] Collected: 03/13/21 1234    Specimen: Blood from Arm, Left Updated: 03/13/21 1530    Narrative:      The following orders were created for panel order [place] Draw.  Procedure                               Abnormality         Status                     ---------                               -----------         ------                     Light Blue Top[[id number]]             "                       Final result               Green Top (Gel)[[id number]]                                  Final result               Lavender Top[[id number]]                                     Final result               Red Top[[id number]]                                          Final result                 Please view results for these tests on the individual orders.    Light Blue Top [[id number]] Collected: 03/13/21 1424    Specimen: Blood from Arm, Right Updated: 03/13/21 1530     Extra Tube hold for add-on     Comment: Auto resulted       D-dimer, Quantitative [[id number]]  (Abnormal) Collected: 03/13/21 1424    Specimen: Blood from Arm, Right Updated: 03/13/21 1518     D-Dimer, Quantitative >20.00 mg/L (FEU)     Narrative:      Reference Range is 0-0.50 mg/L FEU. However, results <0.50 mg/L FEU tends to rule out DVT or PE. Results >0.50 mg/L FEU are not useful in predicting absence or presence of DVT or PE.      Lavender Top [[id number]] Collected: 03/13/21 1234    Specimen: Blood from Arm, Left Updated: 03/13/21 1345     Extra Tube hold for add-on     Comment: Auto resulted       Green Top (Gel) [[id number]] Collected: 03/13/21 1234    Specimen: Blood from Arm, Left Updated: 03/13/21 1345     Extra Tube Hold for add-ons.     Comment: Auto resulted.       Red Top [[id number]] Collected: 03/13/21 1234    Specimen: Blood from Arm, Left Updated: 03/13/21 1345     Extra Tube Hold for add-ons.     Comment: Auto resulted.       COVID-19,[facility] IN-HOUSE,Nasal Swab No Transport Media 3-4 HR TAT - Swab, Nasal Cavity [[id number]]  (Normal) Collected: 03/13/21 1227    Specimen: Swab from Nasal Cavity Updated: 03/13/21 1323     COVID19 Not Detected    Narrative:      Fact sheet for providers: https://www.fda.gov/media/260472/download     Fact sheet for patients: https://www.fda.gov/media/823520/download    Test performed by PCR.    Consider negative results in combination with clinical observations, patient history, and  epidemiological information.    Comprehensive Metabolic Panel [[id number]]  (Abnormal) Collected: 03/13/21 1234    Specimen: Blood from Arm, Left Updated: 03/13/21 1310     Glucose 274 mg/dL      BUN 14 mg/dL      Creatinine 1.27 mg/dL      Sodium 139 mmol/L      Potassium 4.9 mmol/L      Chloride 103 mmol/L      CO2 24.0 mmol/L      Calcium 9.4 mg/dL      Total Protein 7.6 g/dL      Albumin 4.30 g/dL      ALT (SGPT) 40 U/L      AST (SGOT) 38 U/L      Alkaline Phosphatase 103 U/L      Total Bilirubin 1.0 mg/dL      eGFR Non African Amer 55 mL/min/1.73      Globulin 3.3 gm/dL      A/G Ratio 1.3 g/dL      BUN/Creatinine Ratio 11.0     Anion Gap 12.0 mmol/L     Narrative:      GFR Normal >60  Chronic Kidney Disease <60  Kidney Failure <15      Troponin [[id number]]  (Normal) Collected: 03/13/21 1234    Specimen: Blood from Arm, Left Updated: 03/13/21 1308     Troponin T 0.020 ng/mL     Narrative:      Troponin T Reference Range:  <= 0.03 ng/mL-   Negative for AMI  >0.03 ng/mL-     Abnormal for myocardial necrosis.  Clinicians would have to utilize clinical acumen, EKG, Troponin and serial changes to determine if it is an Acute Myocardial Infarction or myocardial injury due to an underlying chronic condition.       Results may be falsely decreased if patient taking Biotin.      BNP [[id number]]  (Normal) Collected: 03/13/21 1234    Specimen: Blood from Arm, Left Updated: 03/13/21 1307     proBNP 199.0 pg/mL     Narrative:      Among patients with dyspnea, NT-proBNP is highly sensitive for the detection of acute congestive heart failure. In addition NT-proBNP of <300 pg/ml effectively rules out acute congestive heart failure with 99% negative predictive value.    Results may be falsely decreased if patient taking Biotin.      Blood Gas, Arterial - [[id number]]  (Abnormal) Collected: 03/13/21 1242    Specimen: Arterial Blood Updated: 03/13/21 1247     Site Right Radial     Carlos's Test Positive     pH, Arterial 7.339 pH units       Comment: 84 Value below reference range        pCO2, Arterial 36.7 mm Hg      pO2, Arterial 77.7 mm Hg      Comment: 84 Value below reference range        HCO3, Arterial 19.7 mmol/L      Comment: 84 Value below reference range        Base Excess, Arterial -5.4 mmol/L      Comment: 84 Value below reference range        O2 Saturation, Arterial 95.1 %      Temperature 37.0 C      Barometric Pressure for Blood Gas 759 mmHg      Modality Nasal Cannula     Flow Rate 2.0 lpm      Ventilator Mode NA     Collected by [id number]     Comment: Meter: [id number]     :  [id number]        pCO2, Temperature Corrected 36.7 mm Hg      pH, Temp Corrected 7.339 pH Units      pO2, Temperature Corrected 77.7 mm Hg     CBC & Differential [[id number]]  (Abnormal) Collected: 03/13/21 1234    Specimen: Blood from Arm, Left Updated: 03/13/21 1246    Narrative:      The following orders were created for panel order CBC & Differential.  Procedure                               Abnormality         Status                     ---------                               -----------         ------                     CBC Auto Differential[[id number]]        Abnormal            Final result                 Please view results for these tests on the individual orders.    CBC Auto Differential [[id number]]  (Abnormal) Collected: 03/13/21 1234    Specimen: Blood from Arm, Left Updated: 03/13/21 1246     WBC 10.50 10*3/mm3      RBC 5.11 10*6/mm3      Hemoglobin 14.3 g/dL      Hematocrit 42.4 %      MCV 83.0 fL      MCH 28.0 pg      MCHC 33.7 g/dL      RDW 13.6 %      RDW-SD 40.5 fl      MPV 9.5 fL      Platelets 170 10*3/mm3      Neutrophil % 66.1 %      Lymphocyte % 20.8 %      Monocyte % 8.9 %      Eosinophil % 3.0 %      Basophil % 0.7 %      Immature Grans % 0.5 %      Neutrophils, Absolute 6.96 10*3/mm3      Lymphocytes, Absolute 2.18 10*3/mm3      Monocytes, Absolute 0.93 10*3/mm3      Eosinophils, Absolute 0.31 10*3/mm3      Basophils,  Absolute 0.07 10*3/mm3      Immature Grans, Absolute 0.05 10*3/mm3      nRBC 0.0 /100 WBC         Imaging Results (Last 24 Hours)     Procedure Component Value Units Date/Time    CT Angiogram Chest [[id number]] Collected: 03/13/21 1524     Updated: 03/13/21 1534    Narrative:      EXAMINATION: CT ANGIOGRAM CHEST- 3/13/2021 3:24 PM CST     HISTORY: Chest tightness, dyspnea, hypoxemia     DOSE: 391 mGycm (Automatic exposure control technique was implemented in  an effort to keep the radiation dose as low as possible without  compromising image quality)     REPORT: Spiral CT of the chest was performed after administration of  intravenous contrast from the thoracic inlet through the upper abdomen  using CTA protocol. Reconstructed coronal and sagittal images were also  reviewed.     Comparison: There are no correlative imaging studies for comparison.     There is extensive streak artifact emanating from the oropharynx. The  contrast bolus is satisfactory, the pulmonary arteries are normal  caliber. There are multiple low-attenuation filling defects within the  right and left main pulmonary arteries as well as the first, second  third order branches compatible with acute pulmonary emboli. There is  straightening of the cardiac ventricular septum compatible with right  heart strain. Heart size is normal. Previous median sternotomy is noted.  Moderate calcified plaque is noted within the native coronary arteries.  The thoracic aorta is normal caliber, no evidence of aortic dissection  is identified. No intrathoracic lymphadenopathy is seen. Review of lung  windows demonstrates moderate respiratory motion artifact. There is no  pulmonary consolidation. No pneumothorax or pleural effusion is  identified. There is mild right basilar atelectasis. The visualized  upper abdomen shows no acute abnormality. There is an exophytic cyst at  the upper pole the left kidney. This measures approximately 1.7 cm.  Review of bone windows is  unremarkable.       Impression:      1. Extensive acute pulmonary emboli involving the right and left main  pulmonary arteries, first, second and third order pulmonary artery  branches bilaterally, with evidence of right heart strain. No saddle  embolus is identified.     REPORT called to Dr. Luque in the emergency department at the time of  dictation.              This report was finalized on 03/13/2021 15:31 by Dr. Je Cantor MD.    XR Chest 1 View [[id number]] Collected: 03/13/21 1238     Updated: 03/13/21 1244    Narrative:      EXAMINATION: XR CHEST 1 VW- 3/13/2021 12:38 PM CST     HISTORY: CHF/COPD Protocol.     REPORT: A frontal view the chest was obtained.     COMPARISON: Chest x-rays 12/7/2008.     There is moderate elevation of the right hemidiaphragm. There is mild  central vascular congestion. No pulmonary infiltrate or consolidation is  identified. Heart size is normal. There is evidence of previous median  sternotomy. No pneumothorax or pleural effusion is identified. The  osseous structures are unremarkable.       Impression:      Mild central vascular congestion with no evidence of overt  CHF. Prior median sternotomy is noted. No consolidating infiltrates.  This report was finalized on 03/13/2021 12:41 by Dr. Je Cantor MD.        I have personally reviewed and interpreted the radiology studies and ECG obtained at time of admission.     Assessment / Plan     Assessment:   Active Hospital Problems    Diagnosis    • **Acute bilateral pulmonary embolism with acute cor pulmonale (CMS/HCC)    • Hypoxia secondary to acute bilateral pulmonary emboli    • BMI 35.0-35.9,adult    • Benign essential HTN    • Hyperlipidemia    • Type 2 diabetes mellitus with hyperglycemia, with long-term current use of insulin (CMS/HCC)      Plan:   1.  Presented to ER with sudden onset of shortness of breath persistent with mild chest tightness.  No diaphoresis.  Saturation 89% on room air.  D-dimer greater than  20  2.  CT angiogram of the chest extensive acute pulmonary emboli involving right and left main pulmonary arteries with evidence of right heart strain.  ER physician discussed with Dr. Blank and no immediate intervention at this time  3.  Lovenox 1 mg/kg.  Plan to transition to Eliquis 10 mg twice daily for 7 days then 5 mg twice daily.  4.  Echocardiogram today.  5.  Venous Dopplers bilateral lower extremities today.  6.  Oxygen to keep saturation greater than 90 to 92%.  7.  Repeat troponin and BMP at 1700  8.  Accu-Cheks with sliding scale insulin coverage.  Hold Metformin.  9.  Home medications reviewed and will be resumed if appropriate.  10.  Check hemoglobin A1c.  BMP, lipid panel in a.m.  11.  Zofran as needed for nausea.  Norco as needed for pain.    Code Status: Full code.  His wife is his power of      I discussed the patient's findings and my recommendations with Dr. Najera and patient    Estimated length of stay 2 days    Patient seen and examined by me on 03/13/2021 at 1615.  The above documentation resulted from a face-to-face encounter by me Nancy GIMENEZ, [facility].    Electronically signed by AMMY Rowell, 03/13/21, 16:37 CST.

## 2021-03-13 NOTE — ED NOTES
Assigned myself to this patient .... pt with O2 by n/c in place at 2 lpm.       Stephanie Galeano RN  03/13/21 [id number]

## 2021-03-14 ENCOUNTER — APPOINTMENT (OUTPATIENT)
Dept: CARDIOLOGY | Facility: HOSPITAL | Age: 76
End: 2021-03-14

## 2021-03-14 ENCOUNTER — APPOINTMENT (OUTPATIENT)
Dept: ULTRASOUND IMAGING | Facility: HOSPITAL | Age: 76
End: 2021-03-14

## 2021-03-14 PROBLEM — I82.431 DEEP VEIN THROMBOSIS (DVT) OF POPLITEAL VEIN OF RIGHT LOWER EXTREMITY: Status: ACTIVE | Noted: 2021-03-14

## 2021-03-14 LAB
ANION GAP SERPL CALCULATED.3IONS-SCNC: 15 MMOL/L (ref 5–15)
BH CV ECHO MEAS - AO MAX PG (FULL): 3.1 MMHG
BH CV ECHO MEAS - AO MAX PG: 7.6 MMHG
BH CV ECHO MEAS - AO MEAN PG (FULL): 1 MMHG
BH CV ECHO MEAS - AO MEAN PG: 4 MMHG
BH CV ECHO MEAS - AO ROOT AREA (BSA CORRECTED): 1.7
BH CV ECHO MEAS - AO ROOT AREA: 10.8 CM^2
BH CV ECHO MEAS - AO ROOT DIAM: 3.7 CM
BH CV ECHO MEAS - AO V2 MAX: 138 CM/SEC
BH CV ECHO MEAS - AO V2 MEAN: 89.6 CM/SEC
BH CV ECHO MEAS - AO V2 VTI: 21.1 CM
BH CV ECHO MEAS - AVA(I,A): 3.9 CM^2
BH CV ECHO MEAS - AVA(I,D): 3.9 CM^2
BH CV ECHO MEAS - AVA(V,A): 3.2 CM^2
BH CV ECHO MEAS - AVA(V,D): 3.2 CM^2
BH CV ECHO MEAS - BSA(HAYCOCK): 2.3 M^2
BH CV ECHO MEAS - BSA: 2.2 M^2
BH CV ECHO MEAS - BZI_BMI: 34.5 KILOGRAMS/M^2
BH CV ECHO MEAS - BZI_METRIC_HEIGHT: 172.7 CM
BH CV ECHO MEAS - BZI_METRIC_WEIGHT: 103 KG
BH CV ECHO MEAS - EDV(CUBED): 75.2 ML
BH CV ECHO MEAS - EDV(MOD-SP4): 96.2 ML
BH CV ECHO MEAS - EDV(TEICH): 79.5 ML
BH CV ECHO MEAS - EF(CUBED): 75 %
BH CV ECHO MEAS - EF(MOD-SP4): 71.2 %
BH CV ECHO MEAS - EF(TEICH): 67.2 %
BH CV ECHO MEAS - ESV(CUBED): 18.8 ML
BH CV ECHO MEAS - ESV(MOD-SP4): 27.7 ML
BH CV ECHO MEAS - ESV(TEICH): 26 ML
BH CV ECHO MEAS - FS: 37 %
BH CV ECHO MEAS - IVS/LVPW: 0.98
BH CV ECHO MEAS - IVSD: 1 CM
BH CV ECHO MEAS - LA DIMENSION: 3.6 CM
BH CV ECHO MEAS - LA/AO: 0.97
BH CV ECHO MEAS - LAT PEAK E' VEL: 9.5 CM/SEC
BH CV ECHO MEAS - LV DIASTOLIC VOL/BSA (35-75): 44.6 ML/M^2
BH CV ECHO MEAS - LV MASS(C)D: 140.2 GRAMS
BH CV ECHO MEAS - LV MASS(C)DI: 65 GRAMS/M^2
BH CV ECHO MEAS - LV MAX PG: 4.5 MMHG
BH CV ECHO MEAS - LV MEAN PG: 3 MMHG
BH CV ECHO MEAS - LV SYSTOLIC VOL/BSA (12-30): 12.8 ML/M^2
BH CV ECHO MEAS - LV V1 MAX: 106 CM/SEC
BH CV ECHO MEAS - LV V1 MEAN: 73.1 CM/SEC
BH CV ECHO MEAS - LV V1 VTI: 19.7 CM
BH CV ECHO MEAS - LVIDD: 4.2 CM
BH CV ECHO MEAS - LVIDS: 2.7 CM
BH CV ECHO MEAS - LVLD AP4: 8.2 CM
BH CV ECHO MEAS - LVLS AP4: 6.7 CM
BH CV ECHO MEAS - LVOT AREA (M): 4.2 CM^2
BH CV ECHO MEAS - LVOT AREA: 4.2 CM^2
BH CV ECHO MEAS - LVOT DIAM: 2.3 CM
BH CV ECHO MEAS - LVPWD: 1 CM
BH CV ECHO MEAS - MED PEAK E' VEL: 5.22 CM/SEC
BH CV ECHO MEAS - MV A MAX VEL: 73.7 CM/SEC
BH CV ECHO MEAS - MV DEC SLOPE: 145 CM/SEC^2
BH CV ECHO MEAS - MV DEC TIME: 0.32 SEC
BH CV ECHO MEAS - MV E MAX VEL: 47.1 CM/SEC
BH CV ECHO MEAS - MV E/A: 0.64
BH CV ECHO MEAS - PA ACC TIME: 0.08 SEC
BH CV ECHO MEAS - PA PR(ACCEL): 44.4 MMHG
BH CV ECHO MEAS - RAP SYSTOLE: 5 MMHG
BH CV ECHO MEAS - RVSP: 55.1 MMHG
BH CV ECHO MEAS - SI(AO): 105.2 ML/M^2
BH CV ECHO MEAS - SI(CUBED): 26.1 ML/M^2
BH CV ECHO MEAS - SI(LVOT): 37.9 ML/M^2
BH CV ECHO MEAS - SI(MOD-SP4): 31.8 ML/M^2
BH CV ECHO MEAS - SI(TEICH): 24.8 ML/M^2
BH CV ECHO MEAS - SV(AO): 226.9 ML
BH CV ECHO MEAS - SV(CUBED): 56.3 ML
BH CV ECHO MEAS - SV(LVOT): 81.8 ML
BH CV ECHO MEAS - SV(MOD-SP4): 68.5 ML
BH CV ECHO MEAS - SV(TEICH): 53.4 ML
BH CV ECHO MEAS - TR MAX VEL: 354 CM/SEC
BH CV ECHO MEASUREMENTS AVERAGE E/E' RATIO: 6.4
BH CV XLRA - RV BASE: 4.6 CM
BH CV XLRA - RV LENGTH: 8.4 CM
BH CV XLRA - RV MID: 3.6 CM
BUN SERPL-MCNC: 12 MG/DL (ref 8–23)
BUN/CREAT SERPL: 11.9 (ref 7–25)
CALCIUM SPEC-SCNC: 8.9 MG/DL (ref 8.6–10.5)
CHLORIDE SERPL-SCNC: 103 MMOL/L (ref 98–107)
CO2 SERPL-SCNC: 24 MMOL/L (ref 22–29)
CREAT SERPL-MCNC: 1.01 MG/DL (ref 0.76–1.27)
GFR SERPL CREATININE-BSD FRML MDRD: 72 ML/MIN/1.73
GLUCOSE BLDC GLUCOMTR-MCNC: 192 MG/DL (ref 70–130)
GLUCOSE BLDC GLUCOMTR-MCNC: 198 MG/DL (ref 70–130)
GLUCOSE BLDC GLUCOMTR-MCNC: 211 MG/DL (ref 70–130)
GLUCOSE BLDC GLUCOMTR-MCNC: 242 MG/DL (ref 70–130)
GLUCOSE SERPL-MCNC: 128 MG/DL (ref 65–99)
LEFT ATRIUM VOLUME INDEX: 20.2 ML/M2
LEFT ATRIUM VOLUME: 43.6 CM3
POTASSIUM SERPL-SCNC: 4.1 MMOL/L (ref 3.5–5.2)
QT INTERVAL: 440 MS
QTC INTERVAL: 484 MS
SODIUM SERPL-SCNC: 142 MMOL/L (ref 136–145)

## 2021-03-14 PROCEDURE — 93306 TTE W/DOPPLER COMPLETE: CPT

## 2021-03-14 PROCEDURE — 25010000002 PERFLUTREN 6.52 MG/ML SUSPENSION: Performed by: INTERNAL MEDICINE

## 2021-03-14 PROCEDURE — 93970 EXTREMITY STUDY: CPT | Performed by: SURGERY

## 2021-03-14 PROCEDURE — 82962 GLUCOSE BLOOD TEST: CPT

## 2021-03-14 PROCEDURE — 25010000002 ENOXAPARIN PER 10 MG: Performed by: NURSE PRACTITIONER

## 2021-03-14 PROCEDURE — 80048 BASIC METABOLIC PNL TOTAL CA: CPT | Performed by: NURSE PRACTITIONER

## 2021-03-14 PROCEDURE — 63710000001 INSULIN LISPRO (HUMAN) PER 5 UNITS: Performed by: INTERNAL MEDICINE

## 2021-03-14 PROCEDURE — 93970 EXTREMITY STUDY: CPT

## 2021-03-14 PROCEDURE — 93306 TTE W/DOPPLER COMPLETE: CPT | Performed by: INTERNAL MEDICINE

## 2021-03-14 PROCEDURE — 63710000001 INSULIN DETEMIR PER 5 UNITS: Performed by: INTERNAL MEDICINE

## 2021-03-14 PROCEDURE — 63710000001 INSULIN LISPRO (HUMAN) PER 5 UNITS: Performed by: NURSE PRACTITIONER

## 2021-03-14 RX ADMIN — LISINOPRIL: 20 TABLET ORAL at 08:52

## 2021-03-14 RX ADMIN — PERFLUTREN 8.48 MG: 6.52 INJECTION, SUSPENSION INTRAVENOUS at 10:03

## 2021-03-14 RX ADMIN — ENOXAPARIN SODIUM 100 MG: 100 INJECTION SUBCUTANEOUS at 17:42

## 2021-03-14 RX ADMIN — SODIUM CHLORIDE, PRESERVATIVE FREE 10 ML: 5 INJECTION INTRAVENOUS at 08:54

## 2021-03-14 RX ADMIN — INSULIN LISPRO 2 UNITS: 100 INJECTION, SOLUTION INTRAVENOUS; SUBCUTANEOUS at 08:35

## 2021-03-14 RX ADMIN — ENOXAPARIN SODIUM 100 MG: 100 INJECTION SUBCUTANEOUS at 05:10

## 2021-03-14 RX ADMIN — METOPROLOL TARTRATE 50 MG: 50 TABLET ORAL at 08:52

## 2021-03-14 RX ADMIN — SODIUM CHLORIDE, PRESERVATIVE FREE 10 ML: 5 INJECTION INTRAVENOUS at 20:10

## 2021-03-14 RX ADMIN — INSULIN LISPRO 3 UNITS: 100 INJECTION, SOLUTION INTRAVENOUS; SUBCUTANEOUS at 17:42

## 2021-03-14 RX ADMIN — INSULIN DETEMIR 20 UNITS: 100 INJECTION, SOLUTION SUBCUTANEOUS at 20:09

## 2021-03-14 RX ADMIN — INSULIN LISPRO 3 UNITS: 100 INJECTION, SOLUTION INTRAVENOUS; SUBCUTANEOUS at 12:05

## 2021-03-14 RX ADMIN — ATORVASTATIN CALCIUM 80 MG: 40 TABLET, FILM COATED ORAL at 20:10

## 2021-03-14 RX ADMIN — METOPROLOL TARTRATE 50 MG: 50 TABLET ORAL at 20:10

## 2021-03-14 NOTE — PROGRESS NOTES
[facility] Medicine Services  INPATIENT PROGRESS NOTE    Length of Stay: 1  Date of Admission: 3/13/2021  Primary Care Physician: Pallavi Mtz MD    Subjective   Chief Complaint: Follow-up shortness of breath  HPI   Presented to ER 3/13 with sudden onset of shortness of breath that did not improve with rest.  D-dimer elevated and CT angiogram of the chest showed extensive acute bilateral pulmonary emboli with evidence of right heart strain.    Lying in bed in CCU.  Oxygen at 5 L.  He was on 4 L and increased during the night for saturation of 89%.  He denies increased shortness of breath.  Will wean oxygen down.  Denies chest pain or palpitations.  Denies nausea, vomiting or abdominal pain.  Denies calf pain.  For echocardiogram and venous Dopplers today.  On Lovenox.  Plan to transition to Eliquis.  Hopefully move out of critical care to telemetry floor today    Review of Systems   Constitutional: Negative for appetite change, chills, diaphoresis, fatigue, fever and unexpected weight change.   HENT: Negative for congestion and trouble swallowing.    Eyes: Negative for photophobia and visual disturbance.   Respiratory: Positive for chest tightness and shortness of breath. Negative for cough and wheezing.    Cardiovascular: Positive for leg swelling (Ankle swelling, chronic, unchanged). Negative for chest pain.   Gastrointestinal: Negative for abdominal distention, diarrhea, nausea and vomiting.   Endocrine: Negative for cold intolerance, heat intolerance and polyuria.   Genitourinary: Negative for dysuria, frequency and urgency.   Musculoskeletal: Negative for back pain and gait problem.   Skin: Negative for color change, pallor, rash and wound.   Allergic/Immunologic: Negative for immunocompromised state.   Neurological: Negative for weakness.   Hematological: Negative for adenopathy. Does not bruise/bleed easily.   Psychiatric/Behavioral: Negative for agitation,  behavioral problems and confusion.     All pertinent negatives and positives are as above. All other systems have been reviewed and are negative unless otherwise stated.     Objective    Temp:  [97.5 °F (36.4 °C)-98.5 °F (36.9 °C)] 98 °F (36.7 °C)  Heart Rate:  [] 67  Resp:  [18-20] 20  BP: (111-175)/(63-98) 152/72  Physical Exam  Vitals and nursing note reviewed.   Constitutional:       Appearance: He is obese.      Comments: Lying in bed.  Oxygen at 5 L  HENT:      Head: Normocephalic and atraumatic.      Nose: No congestion.      Mouth/Throat:      Pharynx: No oropharyngeal exudate.   Eyes:      Extraocular Movements: Extraocular movements intact.      Pupils: Pupils are equal, round, and reactive to light.   Cardiovascular:      Rate and Rhythm: Normal rate and regular rhythm.      Heart sounds: No murmur.      Comments: Normal sinus rhythm 60 on bedside monitor  Pulmonary:      Comments: Oxygen 5 L.  Abdominal:      Palpations: Abdomen is soft.   Genitourinary:     Comments: Voiding.  Musculoskeletal:         General: No swelling or tenderness.      Cervical back: Normal range of motion and neck supple.   Skin:     General: Skin is warm and dry.   Neurological:      General: No focal deficit present.      Mental Status: He is alert and oriented to person, place, and time.   Psychiatric:         Mood and Affect: Mood normal.         Behavior: Behavior normal.         Thought Content: Thought content normal.         Judgment: Judgment normal.      Results Review:  I have reviewed the labs, radiology results, and diagnostic studies.    Laboratory Data:      Results from last 7 days   Lab Units 03/13/21  1234   WBC 10*3/mm3 10.50   HEMOGLOBIN g/dL 14.3   HEMATOCRIT % 42.4   PLATELETS 10*3/mm3 170        Results from last 7 days   Lab Units 03/14/21  0301 03/13/21  1234   SODIUM mmol/L 142 139   POTASSIUM mmol/L 4.1 4.9   CHLORIDE mmol/L 103 103   CO2 mmol/L 24.0 24.0   BUN mg/dL 12 14   CREATININE mg/dL 1.01  1.27   GLUCOSE mg/dL 128* 274*   CALCIUM mg/dL 8.9 9.4   ALT (SGPT) U/L  --  40     Culture Data:    No results found for: BLOODCX, URINECX, WOUNDCX, MRSACX, RESPCX, STOOLCX    Radiology Data:   Imaging Results (Last 7 Days)     Procedure Component Value Units Date/Time    US Venous Doppler Lower Extremity Bilateral (duplex) [[id number]] Resulted: 03/14/21 0857     Updated: 03/14/21 0929    CT Angiogram Chest [[id number]] Collected: 03/13/21 1524     Updated: 03/13/21 1534    Narrative:      EXAMINATION: CT ANGIOGRAM CHEST- 3/13/2021 3:24 PM CST     HISTORY: Chest tightness, dyspnea, hypoxemia     DOSE: 391 mGycm (Automatic exposure control technique was implemented in  an effort to keep the radiation dose as low as possible without  compromising image quality)     REPORT: Spiral CT of the chest was performed after administration of  intravenous contrast from the thoracic inlet through the upper abdomen  using CTA protocol. Reconstructed coronal and sagittal images were also  reviewed.     Comparison: There are no correlative imaging studies for comparison.     There is extensive streak artifact emanating from the oropharynx. The  contrast bolus is satisfactory, the pulmonary arteries are normal  caliber. There are multiple low-attenuation filling defects within the  right and left main pulmonary arteries as well as the first, second  third order branches compatible with acute pulmonary emboli. There is  straightening of the cardiac ventricular septum compatible with right  heart strain. Heart size is normal. Previous median sternotomy is noted.  Moderate calcified plaque is noted within the native coronary arteries.  The thoracic aorta is normal caliber, no evidence of aortic dissection  is identified. No intrathoracic lymphadenopathy is seen. Review of lung  windows demonstrates moderate respiratory motion artifact. There is no  pulmonary consolidation. No pneumothorax or pleural effusion is  identified. There is  mild right basilar atelectasis. The visualized  upper abdomen shows no acute abnormality. There is an exophytic cyst at  the upper pole the left kidney. This measures approximately 1.7 cm.  Review of bone windows is unremarkable.       Impression:      1. Extensive acute pulmonary emboli involving the right and left main  pulmonary arteries, first, second and third order pulmonary artery  branches bilaterally, with evidence of right heart strain. No saddle  embolus is identified.     REPORT called to Dr. Luque in the emergency department at the time of  dictation.              This report was finalized on 03/13/2021 15:31 by Dr. Je Cantor MD.    XR Chest 1 View [[id number]] Collected: 03/13/21 1238     Updated: 03/13/21 1244    Narrative:      EXAMINATION: XR CHEST 1 VW- 3/13/2021 12:38 PM CST     HISTORY: CHF/COPD Protocol.     REPORT: A frontal view the chest was obtained.     COMPARISON: Chest x-rays 12/7/2008.     There is moderate elevation of the right hemidiaphragm. There is mild  central vascular congestion. No pulmonary infiltrate or consolidation is  identified. Heart size is normal. There is evidence of previous median  sternotomy. No pneumothorax or pleural effusion is identified. The  osseous structures are unremarkable.       Impression:      Mild central vascular congestion with no evidence of overt  CHF. Prior median sternotomy is noted. No consolidating infiltrates.  This report was finalized on 03/13/2021 12:41 by Dr. Je Cantor MD.        Transthoracic echocardiogram 3/14/2021  · Left ventricular ejection fraction appears to be greater than 70%. Left ventricular systolic function is hyperdynamic (EF > 70%).  · Estimated right ventricular systolic pressure from tricuspid regurgitation is moderately elevated (45-55 mmHg).  · The right ventricular cavity is mild to moderately dilated, but appears to have normal systolic function. There is no Sorto's sign (which, if present, is rather  specific for acute RV strain from PE).  · No significant valvular pathology.    Venous Doppler bilateral lower extremities 3/13/2021  Thrombus visualized in RT Pop V     Intake/Output    Intake/Output Summary (Last 24 hours) at 3/14/2021 1511  Last data filed at 3/14/2021 1400  Gross per 24 hour   Intake 720 ml   Output 1775 ml   Net -1055 ml       Scheduled Meds  amLODIPine-lisinopril 10-20 mg combo dose, , Oral, Q24H  [START ON 3/15/2021] apixaban, 10 mg, Oral, Q12H   Followed by  [START ON 3/22/2021] apixaban, 5 mg, Oral, Q12H  atorvastatin, 80 mg, Oral, Nightly  enoxaparin, 1 mg/kg, Subcutaneous, Q12H  insulin detemir, 20 Units, Subcutaneous, Nightly  insulin lispro, 2-7 Units, Subcutaneous, TID AC  metoprolol tartrate, 50 mg, Oral, Q12H  sodium chloride, 10 mL, Intravenous, Q12H      I have reviewed the patient current medications.     Assessment/Plan     Active Hospital Problems    Diagnosis    • **Pulmonary embolism without acute cor pulmonale (CMS/HCC)    • Hypoxia secondary to acute bilateral pulmonary emboli    • Deep vein thrombosis (DVT) of popliteal vein of right lower extremity (CMS/HCC)    • BMI 35.0-35.9,adult    • Benign essential HTN    • Hyperlipidemia    • Type 2 diabetes mellitus with hyperglycemia, with long-term current use of insulin (CMS/HCC)      Plan:  1.  Presented to ER with sudden onset of shortness of breath persistent with mild chest tightness.  No diaphoresis.  Saturation 89% on room air.  D-dimer greater than 20  2.  CT angiogram of the chest extensive acute pulmonary emboli involving right and left main pulmonary arteries with evidence of right heart strain.  ER physician discussed with Dr. Blank and no immediate intervention at this time  3.  Lovenox 1 mg/kg.  Plan to transition to Eliquis 10 mg twice daily for 7 days then 5 mg twice daily  4.  Await echocardiogram.  Results now available shows EF greater than 70%.  RVSP pressure moderately elevated 45-55.  Right ventricular  cavity is mild to moderately dilated but appears to have normal systolic function.  No Sorto sign (if present specific for acute RV strain from PE).  No valvular pathology.  5.  Venous Dopplers bilateral lower extremities today.Thrombus visualized in right popliteal vein.  6.  On oxygen at 5 L.  Wean to keep saturation 90 - 92%.  7.  Repeat troponin 0.031.  Patient denies any chest pain.  Follow-up .  8.  Hemoglobin A1c 8.7.  Glucoses 128, 181, 274.  Continue sliding scale insulin coverage.  Hold Metformin and Amaryl.  Will resume Basaglar at 20 units nightly.  9.  Blood Pressure 144/85.  Metoprolol continued.  Patient reports he does take both amlodipine/benazepril and additional benazepril 5 mg.  10.  BMP in a.m.  May be able to transfer out to telemetry floor today.  11.  Bedrest for 24 hours then may ambulate.    His wife will assist with decision-making if he is unable to make decisions for himself  The above documentation resulted from a face-to-face encounter by me Nancy GIMENEZ, [facility].    Discharge Planning: I expect patient to be discharged to home in 1-2 days.    Electronically signed by AMMY Rowell, 3/14/2021, 15:11 CDT.

## 2021-03-14 NOTE — NURSING NOTE
Notified MD Najera that pt had venous dopplers done and resulted as a thrombus in the right popliteal vein. No new orders at this time. Will continue to monitor.

## 2021-03-14 NOTE — NURSING NOTE
Pt. Transferred via wheelchair. BSSR given to Kelly at bedside. Pt. Remained A&Ox4 and remained neurologically intact upon arrival to room 412. Pt was transferred on 4 L NC and was placed on the O2 flowmeter 4L. Pt. Denies pain and rated his pain a 0 on a 0-10 pain scale. Pt. Had belongings transferred with him which included one black bag, clothing, one black cell phone, and one small tablet with a red case. Pt. Had meds transferred with him including Lovenox dose x2, and po meds. BSSR given to Kelly. Report given to   Kelly that pt. Had ECHO and venous dopplers done and that the dopplers showed a right thrombus in the right popliteal vein. No new orders were given when Dr. Najera was notified at 0941. No new orders at this time. Will continue to monitor.

## 2021-03-14 NOTE — PLAN OF CARE
Goal Outcome Evaluation: Transferred in stable condition from CCU.  Denies complaints.  Lovenox administered along with Insulin for meal coverage.  Will continue to monitor.

## 2021-03-14 NOTE — PLAN OF CARE
Goal Outcome Evaluation:   No C/O pain this shift. A & O x 4. HR sinus. Reports dry, infrequent non-productive cough. Shortness of breath with exertion. NC turned up to 5 from 4. 's at beginning of shift but scheduled medication given and 's. Lovenox given this AM

## 2021-03-15 LAB
ANION GAP SERPL CALCULATED.3IONS-SCNC: 8 MMOL/L (ref 5–15)
BUN SERPL-MCNC: 13 MG/DL (ref 8–23)
BUN/CREAT SERPL: 10.2 (ref 7–25)
CALCIUM SPEC-SCNC: 8.8 MG/DL (ref 8.6–10.5)
CHLORIDE SERPL-SCNC: 104 MMOL/L (ref 98–107)
CO2 SERPL-SCNC: 27 MMOL/L (ref 22–29)
CREAT SERPL-MCNC: 1.27 MG/DL (ref 0.76–1.27)
GFR SERPL CREATININE-BSD FRML MDRD: 55 ML/MIN/1.73
GLUCOSE BLDC GLUCOMTR-MCNC: 183 MG/DL (ref 70–130)
GLUCOSE BLDC GLUCOMTR-MCNC: 222 MG/DL (ref 70–130)
GLUCOSE BLDC GLUCOMTR-MCNC: 228 MG/DL (ref 70–130)
GLUCOSE BLDC GLUCOMTR-MCNC: 229 MG/DL (ref 70–130)
GLUCOSE SERPL-MCNC: 163 MG/DL (ref 65–99)
POTASSIUM SERPL-SCNC: 4.2 MMOL/L (ref 3.5–5.2)
SODIUM SERPL-SCNC: 139 MMOL/L (ref 136–145)

## 2021-03-15 PROCEDURE — 63710000001 INSULIN LISPRO (HUMAN) PER 5 UNITS: Performed by: INTERNAL MEDICINE

## 2021-03-15 PROCEDURE — 80048 BASIC METABOLIC PNL TOTAL CA: CPT | Performed by: INTERNAL MEDICINE

## 2021-03-15 PROCEDURE — 82962 GLUCOSE BLOOD TEST: CPT

## 2021-03-15 PROCEDURE — 63710000001 INSULIN DETEMIR PER 5 UNITS: Performed by: INTERNAL MEDICINE

## 2021-03-15 RX ORDER — CHLORAL HYDRATE 500 MG
1000 CAPSULE ORAL
COMMUNITY
End: 2021-03-16 | Stop reason: HOSPADM

## 2021-03-15 RX ADMIN — INSULIN LISPRO 2 UNITS: 100 INJECTION, SOLUTION INTRAVENOUS; SUBCUTANEOUS at 09:46

## 2021-03-15 RX ADMIN — ATORVASTATIN CALCIUM 80 MG: 40 TABLET, FILM COATED ORAL at 21:02

## 2021-03-15 RX ADMIN — SODIUM CHLORIDE, PRESERVATIVE FREE 10 ML: 5 INJECTION INTRAVENOUS at 21:02

## 2021-03-15 RX ADMIN — METOPROLOL TARTRATE 50 MG: 50 TABLET ORAL at 09:45

## 2021-03-15 RX ADMIN — INSULIN LISPRO 3 UNITS: 100 INJECTION, SOLUTION INTRAVENOUS; SUBCUTANEOUS at 17:23

## 2021-03-15 RX ADMIN — APIXABAN 10 MG: 5 TABLET, FILM COATED ORAL at 05:36

## 2021-03-15 RX ADMIN — INSULIN LISPRO 3 UNITS: 100 INJECTION, SOLUTION INTRAVENOUS; SUBCUTANEOUS at 12:33

## 2021-03-15 RX ADMIN — METOPROLOL TARTRATE 50 MG: 50 TABLET ORAL at 21:02

## 2021-03-15 RX ADMIN — SODIUM CHLORIDE, PRESERVATIVE FREE 10 ML: 5 INJECTION INTRAVENOUS at 09:45

## 2021-03-15 RX ADMIN — INSULIN DETEMIR 20 UNITS: 100 INJECTION, SOLUTION SUBCUTANEOUS at 21:03

## 2021-03-15 RX ADMIN — APIXABAN 10 MG: 5 TABLET, FILM COATED ORAL at 21:02

## 2021-03-15 RX ADMIN — LISINOPRIL: 20 TABLET ORAL at 09:44

## 2021-03-15 NOTE — PLAN OF CARE
Problem: Adult Inpatient Plan of Care  Goal: Plan of Care Review  Outcome: Ongoing, Progressing  Flowsheets (Taken 3/15/2021 2981)  Progress: improving  Plan of Care Reviewed With: patient  Outcome Summary: Pt has had no complaints of pain throughout the night. Weaned to 2L NC, sats currently holding 90-93%. Other VSS, NSR/SB 58-78 on tele. Will update MD as needed

## 2021-03-15 NOTE — PROGRESS NOTES
Discharge Planning Assessment  [facility]     Patient Name: Cordell Carreon  MRN: [id number]  Today's Date: 3/15/2021    Admit Date: 3/13/2021    Discharge Needs Assessment     Row Name 03/15/21 1106       Living Environment    Lives With  spouse    Name(s) of Who Lives With Patient  Nelli    Current Living Arrangements  home/apartment/condo    Primary Care Provided by  self    Provides Primary Care For  no one    Family Caregiver if Needed  spouse    Quality of Family Relationships  helpful;involved;supportive    Able to Return to Prior Arrangements  yes       Resource/Environmental Concerns    Resource/Environmental Concerns  none       Transition Planning    Patient/Family Anticipates Transition to  home with family    Patient/Family Anticipated Services at Transition  none    Transportation Anticipated  family or friend will provide       Discharge Needs Assessment    Readmission Within the Last 30 Days  no previous admission in last 30 days    Equipment Currently Used at Home  none    Concerns to be Addressed  no discharge needs identified    Anticipated Changes Related to Illness  none    Equipment Needed After Discharge  none    Discharge Coordination/Progress  Pt has PCP and RX coverage.  Pt can afford medications.  Pt denies any discharge needs and does not want hh services.        Discharge Plan    No documentation.       Continued Care and Services - Admitted Since 3/13/2021    Coordination has not been started for this encounter.         Demographic Summary    No documentation.       Functional Status    No documentation.       Psychosocial    No documentation.       Abuse/Neglect    No documentation.       Legal    No documentation.       Substance Abuse    No documentation.       Patient Forms    No documentation.           SHARMAINE Munroe

## 2021-03-15 NOTE — PLAN OF CARE
Goal Outcome Evaluation:     Progress: improving  Outcome Summary: VSS, HR NSR 63-83, had been weaned to 2L/NC but had to increase back up to 3L/C due to sats less than 90%, also sats drop to upper 80's when up with activity, pt gets moderately SOB with minimal acitivity but reports still feels better than how he felt last Saturday, no c/o of pain, neurovas checks WDL to rt leg, safety maintained

## 2021-03-15 NOTE — PROGRESS NOTES
[facility] Medicine Services  INPATIENT PROGRESS NOTE    Length of Stay: 2  Date of Admission: 3/13/2021  Primary Care Physician: Pallavi Mtz MD    Subjective   Chief Complaint: Follow-up shortness of breath  HPI   Presented to ER 3/13 with sudden onset of shortness of breath that did not improve with rest.  D-dimer elevated and CT angiogram of the chest showed extensive acute bilateral pulmonary emboli.    Sitting up on side of bed.  Oxygen at 3 L and decreased to 2 L during assessment.  He denies nausea, vomiting or abdominal pain.  Denies chest pain or palpitations.  He denies increased shortness of breath.  He has been up in the room.  Has not walked in the hallway.  Denies leg pain.  Continue to wean oxygen today.  Discussed anticoagulation with patient as he is agreeable.  Patient reports shortness of breath has improved since admission.  Oxygen was on 5 L yesterday.    Review of Systems   Constitutional: Negative for appetite change, chills, diaphoresis, fatigue, fever and unexpected weight change.   HENT: Negative for congestion and trouble swallowing.    Eyes: Negative for photophobia and visual disturbance.   Respiratory: Positive for shortness of breath. Negative for cough and wheezing.    Cardiovascular: Positive for leg swelling (Ankle swelling, chronic, unchanged). Negative for chest pain.   Gastrointestinal: Negative for abdominal distention, diarrhea, nausea and vomiting.   Endocrine: Negative for cold intolerance, heat intolerance and polyuria.   Genitourinary: Negative for dysuria, frequency and urgency.   Musculoskeletal: Negative for back pain and gait problem.   Skin: Negative for color change, pallor, rash and wound.   Allergic/Immunologic: Negative for immunocompromised state.   Neurological: Negative for weakness.   Hematological: Negative for adenopathy. Does not bruise/bleed easily.   Psychiatric/Behavioral: Negative for agitation, behavioral  problems and confusion.     All pertinent negatives and positives are as above. All other systems have been reviewed and are negative unless otherwise stated.     Objective    Temp:  [97 °F (36.1 °C)-98.5 °F (36.9 °C)] 97.4 °F (36.3 °C)  Heart Rate:  [59-84] 84  Resp:  [12-20] 16  BP: (114-152)/(52-92) 114/72  Physical Exam  Vitals and nursing note reviewed.   Constitutional:       Appearance: He is obese.      Comments:  Sitting up on side of bed.  Oxygen at 2 L  HENT:      Head: Normocephalic and atraumatic.      Nose: No congestion.      Mouth/Throat:      Pharynx: No oropharyngeal exudate.   Eyes:      Extraocular Movements: Extraocular movements intact.      Pupils: Pupils are equal, round, and reactive to light.   Cardiovascular:      Rate and Rhythm: Normal rate and regular rhythm.      Heart sounds: No murmur.      Comments: Normal sinus rhythm  58-70 on telemetry  Pulmonary:      Comments: Oxygen 2 L.  Abdominal:      Palpations: Abdomen is soft.   Genitourinary:     Comments: Voiding.  Musculoskeletal:         General: No swelling or tenderness.      Cervical back: Normal range of motion and neck supple.   Skin:     General: Skin is warm and dry.   Neurological:      General: No focal deficit present.      Mental Status: He is alert and oriented to person, place, and time.   Psychiatric:         Mood and Affect: Mood normal.         Behavior: Behavior normal.         Thought Content: Thought content normal.         Judgment: Judgment normal.     Results Review:  I have reviewed the labs, radiology results, and diagnostic studies.    Laboratory Data:      Results from last 7 days   Lab Units 03/13/21  1234   WBC 10*3/mm3 10.50   HEMOGLOBIN g/dL 14.3   HEMATOCRIT % 42.4   PLATELETS 10*3/mm3 170        Results from last 7 days   Lab Units 03/15/21  0451 03/14/21  0301 03/13/21  1234   SODIUM mmol/L 139 142 139   POTASSIUM mmol/L 4.2 4.1 4.9   CHLORIDE mmol/L 104 103 103   CO2 mmol/L 27.0 24.0 24.0   BUN mg/dL  13 12 14   CREATININE mg/dL 1.27 1.01 1.27   GLUCOSE mg/dL 163* 128* 274*   CALCIUM mg/dL 8.8 8.9 9.4   ALT (SGPT) U/L  --   --  40       Culture Data:    No results found for: BLOODCX, URINECX, WOUNDCX, MRSACX, RESPCX, STOOLCX    Radiology Data:   Imaging Results (Last 7 Days)     Procedure Component Value Units Date/Time    US Venous Doppler Lower Extremity Bilateral (duplex) [[id number]] Collected: 03/15/21 0844     Updated: 03/15/21 0847    Narrative:      History: Swelling       Impression:      Impression: There is evidence of deep venous thrombosis in the right  lower extremity. The left lower extremity shows no evidence of deep  venous thrombosis. There is no evidence of superficial thrombophlebitis.     Comments: Bilateral lower extremity venous duplex exam was performed  using color Doppler flow, Doppler waveform analysis, and grayscale  imaging, with and without compression. There is evidence of deep venous  thrombosis in the right popliteal vein. All other visualized veins show  no evidence of DVT. No thrombus is identified in the saphenofemoral  junctions and greater saphenous veins bilaterally.         This report was finalized on 03/15/2021 08:44 by Dr. Getachew Berger MD.    CT Angiogram Chest [[id number]] Collected: 03/13/21 1524     Updated: 03/13/21 1534    Narrative:      EXAMINATION: CT ANGIOGRAM CHEST- 3/13/2021 3:24 PM CST     HISTORY: Chest tightness, dyspnea, hypoxemia     DOSE: 391 mGycm (Automatic exposure control technique was implemented in  an effort to keep the radiation dose as low as possible without  compromising image quality)     REPORT: Spiral CT of the chest was performed after administration of  intravenous contrast from the thoracic inlet through the upper abdomen  using CTA protocol. Reconstructed coronal and sagittal images were also  reviewed.     Comparison: There are no correlative imaging studies for comparison.     There is extensive streak artifact emanating from the  oropharynx. The  contrast bolus is satisfactory, the pulmonary arteries are normal  caliber. There are multiple low-attenuation filling defects within the  right and left main pulmonary arteries as well as the first, second  third order branches compatible with acute pulmonary emboli. There is  straightening of the cardiac ventricular septum compatible with right  heart strain. Heart size is normal. Previous median sternotomy is noted.  Moderate calcified plaque is noted within the native coronary arteries.  The thoracic aorta is normal caliber, no evidence of aortic dissection  is identified. No intrathoracic lymphadenopathy is seen. Review of lung  windows demonstrates moderate respiratory motion artifact. There is no  pulmonary consolidation. No pneumothorax or pleural effusion is  identified. There is mild right basilar atelectasis. The visualized  upper abdomen shows no acute abnormality. There is an exophytic cyst at  the upper pole the left kidney. This measures approximately 1.7 cm.  Review of bone windows is unremarkable.       Impression:      1. Extensive acute pulmonary emboli involving the right and left main  pulmonary arteries, first, second and third order pulmonary artery  branches bilaterally, with evidence of right heart strain. No saddle  embolus is identified.     REPORT called to Dr. Luque in the emergency department at the time of  dictation.              This report was finalized on 03/13/2021 15:31 by Dr. Je Cantor MD.    XR Chest 1 View [[id number]] Collected: 03/13/21 1238     Updated: 03/13/21 1244    Narrative:      EXAMINATION: XR CHEST 1 VW- 3/13/2021 12:38 PM CST     HISTORY: CHF/COPD Protocol.     REPORT: A frontal view the chest was obtained.     COMPARISON: Chest x-rays 12/7/2008.     There is moderate elevation of the right hemidiaphragm. There is mild  central vascular congestion. No pulmonary infiltrate or consolidation is  identified. Heart size is normal. There is  evidence of previous median  sternotomy. No pneumothorax or pleural effusion is identified. The  osseous structures are unremarkable.       Impression:      Mild central vascular congestion with no evidence of overt  CHF. Prior median sternotomy is noted. No consolidating infiltrates.  This report was finalized on 03/13/2021 12:41 by Dr. Je Cantor MD.        Transthoracic echocardiogram 3/14/2021  · Left ventricular ejection fraction appears to be greater than 70%. Left ventricular systolic function is hyperdynamic (EF > 70%).  · Estimated right ventricular systolic pressure from tricuspid regurgitation is moderately elevated (45-55 mmHg).  · The right ventricular cavity is mild to moderately dilated, but appears to have normal systolic function. There is no Sorto's sign (which, if present, is rather specific for acute RV strain from PE).  · No significant valvular pathology.     Intake/Output    Intake/Output Summary (Last 24 hours) at 3/15/2021 1113  Last data filed at 3/15/2021 0820  Gross per 24 hour   Intake 600 ml   Output 1430 ml   Net -830 ml       Scheduled Meds  amLODIPine-lisinopril 10-20 mg combo dose, , Oral, Q24H  apixaban, 10 mg, Oral, Q12H   Followed by  [START ON 3/22/2021] apixaban, 5 mg, Oral, Q12H  atorvastatin, 80 mg, Oral, Nightly  insulin detemir, 20 Units, Subcutaneous, Nightly  insulin lispro, 2-7 Units, Subcutaneous, TID AC  metoprolol tartrate, 50 mg, Oral, Q12H  sodium chloride, 10 mL, Intravenous, Q12H        I have reviewed the patient current medications.     Assessment/Plan     Active Hospital Problems    Diagnosis    • **Pulmonary embolism without acute cor pulmonale (CMS/HCC)    • Hypoxia secondary to acute bilateral pulmonary emboli    • Deep vein thrombosis (DVT) of popliteal vein of right lower extremity (CMS/HCC)    • BMI 35.0-35.9,adult    • Benign essential HTN    • Hyperlipidemia    • Type 2 diabetes mellitus with hyperglycemia, with long-term current use of insulin  (CMS/[facility])        Plan:  1.  To ER 3/13 with sudden onset of shortness of breath persistent with mild chest tightness.  No diaphoresis.  Saturation 89% on room air.  D-dimer greater than 20  2.  CT angiogram of the chest extensive acute pulmonary emboli involving right and left main pulmonary arteries with evidence of right heart strain.  ER physician discussed with Dr. Blank and no immediate intervention.   3.  Lovenox 1 mg/kg on admission.  Transitioned to Eliquis 10 mg twice daily for 7 days beginning today.  Eliquis 5 mg twice daily beginning 3/22.  4.  Echo 3/14 reported  EF greater than 70%.  RVSP pressure moderately elevated 45-55.  Right ventricular cavity is mild to moderately dilated but appears to have normal systolic function.  No Sorto sign (if present specific for acute RV strain from PE).  No valvular pathology.  5.  Venous Dopplers noted thrombus right popliteal vein.  6.  Wean oxygen as tolerated.  5 L yesterday down to 2 L today.  7.  Hemoglobin A1c 8.7.  Sliding scale insulin coverage.  Glucoses 183, 163, 192.  Hold Metformin.  Will increase Basaglar to 30 units nightly, home dose.  8.  Blood pressure 114/72, 142/57.  Confirmed that patient takes both amlodipine/benazepril and additional dose of benazepril 5 mg.  9.  Ambulate.    His wife will assist with decision-making if he is unable to make decisions for himself  The above documentation resulted from a face-to-face encounter by me Nancy GIMENEZ, [facility].    Discharge Planning: I expect patient to be discharged to home in 1 day.    Electronically signed by AMMY Rowell, 3/15/2021, 11:13 CDT.

## 2021-03-16 ENCOUNTER — READMISSION MANAGEMENT (OUTPATIENT)
Dept: CALL CENTER | Facility: HOSPITAL | Age: 76
End: 2021-03-16

## 2021-03-16 VITALS
WEIGHT: 220.8 LBS | RESPIRATION RATE: 16 BRPM | SYSTOLIC BLOOD PRESSURE: 138 MMHG | BODY MASS INDEX: 33.46 KG/M2 | HEART RATE: 61 BPM | OXYGEN SATURATION: 92 % | DIASTOLIC BLOOD PRESSURE: 76 MMHG | HEIGHT: 68 IN | TEMPERATURE: 97.7 F

## 2021-03-16 LAB
ANION GAP SERPL CALCULATED.3IONS-SCNC: 11 MMOL/L (ref 5–15)
BUN SERPL-MCNC: 14 MG/DL (ref 8–23)
BUN/CREAT SERPL: 13.7 (ref 7–25)
CALCIUM SPEC-SCNC: 8.8 MG/DL (ref 8.6–10.5)
CHLORIDE SERPL-SCNC: 104 MMOL/L (ref 98–107)
CO2 SERPL-SCNC: 24 MMOL/L (ref 22–29)
CREAT SERPL-MCNC: 1.02 MG/DL (ref 0.76–1.27)
GFR SERPL CREATININE-BSD FRML MDRD: 71 ML/MIN/1.73
GLUCOSE BLDC GLUCOMTR-MCNC: 184 MG/DL (ref 70–130)
GLUCOSE BLDC GLUCOMTR-MCNC: 200 MG/DL (ref 70–130)
GLUCOSE SERPL-MCNC: 172 MG/DL (ref 65–99)
POTASSIUM SERPL-SCNC: 3.9 MMOL/L (ref 3.5–5.2)
SODIUM SERPL-SCNC: 139 MMOL/L (ref 136–145)

## 2021-03-16 PROCEDURE — 82962 GLUCOSE BLOOD TEST: CPT

## 2021-03-16 PROCEDURE — 94618 PULMONARY STRESS TESTING: CPT

## 2021-03-16 PROCEDURE — 80048 BASIC METABOLIC PNL TOTAL CA: CPT | Performed by: INTERNAL MEDICINE

## 2021-03-16 PROCEDURE — 63710000001 INSULIN LISPRO (HUMAN) PER 5 UNITS: Performed by: INTERNAL MEDICINE

## 2021-03-16 RX ORDER — APIXABAN 5 MG (74)
5 KIT ORAL TAKE AS DIRECTED
Qty: 74 TABLET | Refills: 0 | Status: SHIPPED | OUTPATIENT
Start: 2021-03-16 | End: 2021-04-08

## 2021-03-16 RX ADMIN — INSULIN LISPRO 2 UNITS: 100 INJECTION, SOLUTION INTRAVENOUS; SUBCUTANEOUS at 08:52

## 2021-03-16 RX ADMIN — SODIUM CHLORIDE, PRESERVATIVE FREE 10 ML: 5 INJECTION INTRAVENOUS at 08:50

## 2021-03-16 RX ADMIN — APIXABAN 10 MG: 5 TABLET, FILM COATED ORAL at 08:49

## 2021-03-16 RX ADMIN — INSULIN LISPRO 3 UNITS: 100 INJECTION, SOLUTION INTRAVENOUS; SUBCUTANEOUS at 12:18

## 2021-03-16 RX ADMIN — METOPROLOL TARTRATE 50 MG: 50 TABLET ORAL at 08:50

## 2021-03-16 RX ADMIN — LISINOPRIL: 20 TABLET ORAL at 08:50

## 2021-03-16 NOTE — PROGRESS NOTES
Continued Stay Note   Angelic     Patient Name: Cordell Carreon  MRN: [id number]  Today's Date: 3/16/2021    Admit Date: 3/13/2021    Discharge Plan     Row Name 03/16/21 0933       Plan    Plan  Home    Provided Post Acute Provider List?  Yes    Post Acute Provider List  DME Supplier    Delivered To  Patient    Method of Delivery  In person    Patient/Family in Agreement with Plan  yes    Final Discharge Disposition Code  01 - home or self-care    Final Note  Pt chose [facility] for home oxygen.  SW has faxed referral and spoke to [facility].  They will bring portable tank to pt's room shortly prior to dc.        Discharge Codes    No documentation.       Expected Discharge Date and Time     Expected Discharge Date Expected Discharge Time    Mar 16, 2021             CHRIS MunroeW

## 2021-03-16 NOTE — DISCHARGE PLACEMENT REQUEST
"From:  Charmaine Lagos, BSW  [phone]    John Carreon (75 y.o. Male)     Date of Birth Social Security Number Address Home Phone MRN    1945  [id number] [place] [address] [phone] [id number]    Religious Marital Status          Mormonism        Admission Date Admission Type Admitting Provider Attending Provider Department, Room/Bed    3/13/21 Emergency Dickson Suresh MD Thompson, Benjamin H, MD [facility] 4B, 412/1    Discharge Date Discharge Disposition Discharge Destination         Home or Self Care              Attending Provider: Dickson Suresh MD    Allergies: No Known Allergies    Isolation: None   Infection: None   Code Status: CPR    Ht: 172.7 cm (68\")   Wt: 100 kg (220 lb 12.8 oz)    Admission Cmt: None   Principal Problem: Pulmonary embolism without acute cor pulmonale (CMS/HCC) [I26.99]                 Active Insurance as of 3/13/2021     Primary Coverage     Payor Plan Insurance Group Employer/Plan Group    MEDICARE MEDICARE A & B      Payor Plan Address Payor Plan Phone Number Payor Plan Fax Number Effective Dates    [address] [phone]  9/1/2011 - None Entered    [facility] [id number]       Subscriber Name Subscriber Birth Date Member ID       JOHN CARREON OMERO JAMIL 1945 [id number]           Secondary Coverage     Payor Plan Insurance Group Employer/Plan Group    AARP  SUP AARP HEALTH CARE OPTIONS PLAN F     Payor Plan Address Payor Plan Phone Number Payor Plan Fax Number Effective Dates    [facility] [phone]  1/1/2017 - None Entered    [address]       [facility] [address]       Subscriber Name Subscriber Birth Date Member ID       JOHN CARREON OMERO JAMIL 1945 [id number]                 Emergency Contacts      (Rel.) Home Phone Work Phone Mobile Phone    Nelli Carreon (Spouse) [phone] -- --    Maria Elena Alexandra (Sister) [phone] -- --        Case Management  Consult [[id number]] (Order " [id number])  Order  Date: 3/16/2021 Department: [address] Released By/Authorizing: Nancy Mckeon APRN (auto-released)   Order History  Inpatient  Date/Time Action Taken User Additional Information   03/16/21 0914 Release Nancy Mckeon APRN (auto-released) From Order:[id number]   Order Details    Frequency Duration Priority Order Class   Once 1  occurrence Routine Hospital Performed   Order Questions    Question Answer Comment   Reason for Consult? Please arrange oxygen at 2 L continuously.  Saturation 88% on room air, 87% with activity.           Consult Order Info    ID Description Priority Start Date Start Time   [id number] Case Management  Consult Routine 03/16/2021  9:15 AM   Provider Specialty Referred to   ______________________________________ _____________________________________   Reprint Order Requisition    Case Management  Consult (Order #[id number]) on 3/16/21   Encounter    View Encounter              Order Provider Info        Office phone Pager E-mail   Ordering User Nancy Mckeon APRN [phone] -- --   Authorizing Provider Nancy Mckeon APRN [phone] -- --   Attending Provider Dickson Suresh [phone] -- --   Tracking Reports  Cosign Tracking Order Transmittal Tracking            History & Physical      Nancy Mckeon APRN at 03/13/21 1637     Attestation signed by Jorden Najera MD at 03/13/21 2130    I personally evaluated and examined the patient in conjunction with AMMY Claire and agree with the assessment, treatment plan, and disposition of the patient as recorded by her. My history, exam, and further recommendations are:     Patient is seen and examined at bedside with Nancy.  Patient has no classic risk factors for VTE.  Patient is hemodynamically stable with normal rate and mildly hypoxic.  However given the size of clots feel as though the patient would benefit from ICU stay overnight given  high risk of acute decompensation.      Electronically signed by Jorden Najera MD, 3/13/2021, 21:34 CST.                      [facility] Medicine Services  HISTORY AND PHYSICAL    Date of Admission: 3/13/2021  Primary Care Physician: Pallavi Mtz MD    Subjective     Chief Complaint: Shortness of breath    History of Present Illness  Mr. Carreon is a 75-year-old  male with a past medical history significant for coronary artery bypass grafting 2008, essential hypertension, hyperlipidemia, diabetes mellitus type 2 insulin-dependent.  His primary care provider is Dr. Pallavi Mtz.  He presented to the emergency room today with complaints of shortness of breath.  Patient reports he awoke this morning and shortly after awakening he noted sudden onset of shortness of breath at the time of breakfast.  He experienced some chest tightness.  The shortness of breath lasted for 2 hours.  He denied radiation of chest pain.  He denied nausea, vomiting or abdominal pain.  Denied palpitations or diaphoresis.  Patient reports mild chronic edema in the feet but no recent weight gain or increased lower extremity edema.  He denies any recent sick contact or recent  travel.  He denies any previous history of blood clots and no family history of blood clots.  He does report having a cold or the flu approximately 1 month ago and was tested Covid negative.  In fact, patient reports he has had 4 negative Covid testing.  Reports he is very active up until Covid restrictions.    PO2 89 on room air he was placed on 2 L.  Currently saturation 93%.  PO2 77 on cannula at 2 L.  D-dimer greater than 20, glucose 274, creatinine 1.27, troponin negative, proBNP 199.  Chest x-ray reported mild central vascular congestion with no evidence of overt CHF.  No consolidating infiltrates.  CT scan of the chest reported extensive acute pulmonary emboli involving the right and left main pulmonary arteries,  first, second and third order pulmonary artery branches bilaterally, with evidence of right heart strain.  No saddle embolus identified.  Dr. Luque, ER physician discussed case with Dr. Blank who noted patient did not need immediate intervention.    Lovenox 1 mg/kg given in ER.    Review of Systems   Constitutional: Negative for appetite change, chills, diaphoresis, fatigue, fever and unexpected weight change.   HENT: Negative for congestion and trouble swallowing.    Eyes: Negative for photophobia and visual disturbance.   Respiratory: Positive for chest tightness and shortness of breath. Negative for cough and wheezing.    Cardiovascular: Positive for leg swelling (Ankle swelling, chronic, unchanged). Negative for chest pain.   Gastrointestinal: Negative for abdominal distention, diarrhea, nausea and vomiting.   Endocrine: Negative for cold intolerance, heat intolerance and polyuria.   Genitourinary: Negative for dysuria, frequency and urgency.   Musculoskeletal: Negative for back pain and gait problem.   Skin: Negative for color change, pallor, rash and wound.   Allergic/Immunologic: Negative for immunocompromised state.   Neurological: Negative for weakness.   Hematological: Negative for adenopathy. Does not bruise/bleed easily.   Psychiatric/Behavioral: Negative for agitation, behavioral problems and confusion.      Otherwise complete ROS reviewed and negative except as mentioned in the HPI.    Past Medical History:   Past Medical History:   Diagnosis Date   • Class 2 severe obesity due to excess calories with serious comorbidity and body mass index (BMI) of 35.0 to 35.9 in adult (CMS/[facility]) 10/24/2019   • Diabetes mellitus (CMS/[facility])    • Hyperlipidemia    • Hypertension      Past Surgical History:  Past Surgical History:   Procedure Laterality Date   • CARDIAC SURGERY     • CATARACT EXTRACTION  7/5/17, 7/28/17   • CORONARY ARTERY BYPASS GRAFT  2008     Social History:  reports that he has never smoked. He has  never used smokeless tobacco. He reports previous alcohol use. He reports that he does not use drugs.  He is .  He has 1 son and 1 daughter.  Denies tobacco, alcohol, or illicit drug use.  Jain preference [employer]ism.  Education Masters degree.   at [employer].  He has a living will.  His wife is his power of .  He is a full code.    Family History: family history includes Dementia in his mother; Heart attack in his father and maternal grandfather; No Known Problems in his daughter, maternal grandmother, paternal grandfather, paternal grandmother, and son; Ovarian cancer (age of onset: 62) in his sister.       Allergies:  No Known Allergies  Medications:  Prior to Admission medications    Medication Sig Start Date End Date Taking? Authorizing Provider   amLODIPine-benazepril (LOTREL) 10-20 MG per capsule TAKE 1 CAPSULE BY MOUTH EVERY DAY 10/6/20   Pallavi Mtz MD   aspirin 81 MG EC tablet Take 81 mg by mouth Daily.    Provider, MD Alexis   atorvastatin (LIPITOR) 80 MG tablet TAKE 1 TABLET BY MOUTH EVERY DAY 10/6/20   Pallavi Mtz MD   BD Pen Needle Eliza U/F 32G X 4 MM misc USE TO INJECT ONCE DAILY 2/26/21   Clarice Marroquin APRN   benazepril (LOTENSIN) 5 MG tablet TAKE 1 TABLET BY MOUTH EVERY DAY 3/2/21   Pallavi Mtz MD   glimepiride (AMARYL) 2 MG tablet TAKE 1 TABLET BY MOUTH EVERY DAY BEFORE BREAKFAST 2/26/21   Clarice Marroquin APRN   glucose blood (ONE TOUCH ULTRA TEST) test strip Test once daily. 6/2/17   Domenico Hui MD   Insulin Glargine (BASAGLAR KWIKPEN) 100 UNIT/ML injection pen INJECT 30 UNITS UNDER THE SKIN INTO THE APPROPRIATE AREA AS DIRECTED EVERY NIGHT. 2/26/21   Clarice Marroquin APRN   metFORMIN (GLUCOPHAGE) 1000 MG tablet TAKE 1 TABLET BY MOUTH TWICE A DAY WITH MEALS 10/29/20   Clarice Marroquin APRN   metoprolol tartrate (LOPRESSOR) 50 MG tablet TAKE 1 TABLET BY MOUTH EVERY 12 HOURS 10/2/20   Pallavi Mtz  "MD Rosibel   naproxen sodium (ALEVE) 220 MG tablet Take 220 mg by mouth Daily.    Provider, Historical, MD   ONETOUCH DELICA LANCETS FINE misc Test once daily. 6/2/17   Domenico Hui MD     Objective     Vital Signs: /77   Pulse 77   Temp 97.6 °F (36.4 °C) (Oral)   Resp 19   Ht 172.7 cm (68\")   Wt 108 kg (238 lb)   SpO2 93%   BMI 36.19 kg/m²   Physical Exam  Vitals and nursing note reviewed.   Constitutional:       Appearance: He is obese.      Comments: Sitting up on stretcher.  Oxygen 2 L.   HENT:      Head: Normocephalic and atraumatic.      Nose: No congestion.      Mouth/Throat:      Pharynx: No oropharyngeal exudate.   Eyes:      Extraocular Movements: Extraocular movements intact.      Pupils: Pupils are equal, round, and reactive to light.   Cardiovascular:      Rate and Rhythm: Normal rate and regular rhythm.      Heart sounds: No murmur.      Comments: Normal sinus rhythm 79 on bedside monitor  Pulmonary:      Comments: Oxygen 2 L.  Mild conversational shortness of breath  Abdominal:      Palpations: Abdomen is soft.   Genitourinary:     Comments: Voiding.  Musculoskeletal:         General: No swelling or tenderness.      Cervical back: Normal range of motion and neck supple.   Skin:     General: Skin is warm and dry.   Neurological:      General: No focal deficit present.      Mental Status: He is alert and oriented to person, place, and time.   Psychiatric:         Mood and Affect: Mood normal.         Behavior: Behavior normal.         Thought Content: Thought content normal.         Judgment: Judgment normal.        Results Reviewed:  Lab Results (last 24 hours)     Procedure Component Value Units Date/Time    [place] Draw [[id number]] Collected: 03/13/21 1234    Specimen: Blood from Arm, Left Updated: 03/13/21 1530    Narrative:      The following orders were created for panel order [place] Draw.  Procedure                               Abnormality         Status                     "   ---------                               -----------         ------                     Light Blue Top[[id number]]                                   Final result               Green Top (Gel)[[id number]]                                  Final result               Lavender Top[[id number]]                                     Final result               Red Top[[id number]]                                          Final result                 Please view results for these tests on the individual orders.    Light Blue Top [[id number]] Collected: 03/13/21 1424    Specimen: Blood from Arm, Right Updated: 03/13/21 1530     Extra Tube hold for add-on     Comment: Auto resulted       D-dimer, Quantitative [[id number]]  (Abnormal) Collected: 03/13/21 1424    Specimen: Blood from Arm, Right Updated: 03/13/21 1518     D-Dimer, Quantitative >20.00 mg/L (FEU)     Narrative:      Reference Range is 0-0.50 mg/L FEU. However, results <0.50 mg/L FEU tends to rule out DVT or PE. Results >0.50 mg/L FEU are not useful in predicting absence or presence of DVT or PE.      Lavender Top [[id number]] Collected: 03/13/21 1234    Specimen: Blood from Arm, Left Updated: 03/13/21 1345     Extra Tube hold for add-on     Comment: Auto resulted       Green Top (Gel) [[id number]] Collected: 03/13/21 1234    Specimen: Blood from Arm, Left Updated: 03/13/21 1345     Extra Tube Hold for add-ons.     Comment: Auto resulted.       Red Top [[id number]] Collected: 03/13/21 1234    Specimen: Blood from Arm, Left Updated: 03/13/21 1345     Extra Tube Hold for add-ons.     Comment: Auto resulted.       COVID-19,[facility] IN-HOUSE,Nasal Swab No Transport Media 3-4 HR TAT - Swab, Nasal Cavity [[id number]]  (Normal) Collected: 03/13/21 1227    Specimen: Swab from Nasal Cavity Updated: 03/13/21 1323     COVID19 Not Detected    Narrative:      Fact sheet for providers: https://www.fda.gov/media/111527/download     Fact sheet for patients:  https://www.fda.gov/media/397223/download    Test performed by PCR.    Consider negative results in combination with clinical observations, patient history, and epidemiological information.    Comprehensive Metabolic Panel [[id number]]  (Abnormal) Collected: 03/13/21 1234    Specimen: Blood from Arm, Left Updated: 03/13/21 1310     Glucose 274 mg/dL      BUN 14 mg/dL      Creatinine 1.27 mg/dL      Sodium 139 mmol/L      Potassium 4.9 mmol/L      Chloride 103 mmol/L      CO2 24.0 mmol/L      Calcium 9.4 mg/dL      Total Protein 7.6 g/dL      Albumin 4.30 g/dL      ALT (SGPT) 40 U/L      AST (SGOT) 38 U/L      Alkaline Phosphatase 103 U/L      Total Bilirubin 1.0 mg/dL      eGFR Non African Amer 55 mL/min/1.73      Globulin 3.3 gm/dL      A/G Ratio 1.3 g/dL      BUN/Creatinine Ratio 11.0     Anion Gap 12.0 mmol/L     Narrative:      GFR Normal >60  Chronic Kidney Disease <60  Kidney Failure <15      Troponin [[id number]]  (Normal) Collected: 03/13/21 1234    Specimen: Blood from Arm, Left Updated: 03/13/21 1308     Troponin T 0.020 ng/mL     Narrative:      Troponin T Reference Range:  <= 0.03 ng/mL-   Negative for AMI  >0.03 ng/mL-     Abnormal for myocardial necrosis.  Clinicians would have to utilize clinical acumen, EKG, Troponin and serial changes to determine if it is an Acute Myocardial Infarction or myocardial injury due to an underlying chronic condition.       Results may be falsely decreased if patient taking Biotin.      BNP [[id number]]  (Normal) Collected: 03/13/21 1234    Specimen: Blood from Arm, Left Updated: 03/13/21 1307     proBNP 199.0 pg/mL     Narrative:      Among patients with dyspnea, NT-proBNP is highly sensitive for the detection of acute congestive heart failure. In addition NT-proBNP of <300 pg/ml effectively rules out acute congestive heart failure with 99% negative predictive value.    Results may be falsely decreased if patient taking Biotin.      Blood Gas, Arterial - [[id number]]   (Abnormal) Collected: 03/13/21 1242    Specimen: Arterial Blood Updated: 03/13/21 1247     Site Right Radial     Carlos's Test Positive     pH, Arterial 7.339 pH units      Comment: 84 Value below reference range        pCO2, Arterial 36.7 mm Hg      pO2, Arterial 77.7 mm Hg      Comment: 84 Value below reference range        HCO3, Arterial 19.7 mmol/L      Comment: 84 Value below reference range        Base Excess, Arterial -5.4 mmol/L      Comment: 84 Value below reference range        O2 Saturation, Arterial 95.1 %      Temperature 37.0 C      Barometric Pressure for Blood Gas 759 mmHg      Modality Nasal Cannula     Flow Rate 2.0 lpm      Ventilator Mode NA     Collected by [id number]     Comment: Meter: [id number]     :  [id number]        pCO2, Temperature Corrected 36.7 mm Hg      pH, Temp Corrected 7.339 pH Units      pO2, Temperature Corrected 77.7 mm Hg     CBC & Differential [[id number]]  (Abnormal) Collected: 03/13/21 1234    Specimen: Blood from Arm, Left Updated: 03/13/21 1246    Narrative:      The following orders were created for panel order CBC & Differential.  Procedure                               Abnormality         Status                     ---------                               -----------         ------                     CBC Auto Differential[[id number]]        Abnormal            Final result                 Please view results for these tests on the individual orders.    CBC Auto Differential [[id number]]  (Abnormal) Collected: 03/13/21 1234    Specimen: Blood from Arm, Left Updated: 03/13/21 1246     WBC 10.50 10*3/mm3      RBC 5.11 10*6/mm3      Hemoglobin 14.3 g/dL      Hematocrit 42.4 %      MCV 83.0 fL      MCH 28.0 pg      MCHC 33.7 g/dL      RDW 13.6 %      RDW-SD 40.5 fl      MPV 9.5 fL      Platelets 170 10*3/mm3      Neutrophil % 66.1 %      Lymphocyte % 20.8 %      Monocyte % 8.9 %      Eosinophil % 3.0 %      Basophil % 0.7 %      Immature Grans % 0.5 %      Neutrophils,  Absolute 6.96 10*3/mm3      Lymphocytes, Absolute 2.18 10*3/mm3      Monocytes, Absolute 0.93 10*3/mm3      Eosinophils, Absolute 0.31 10*3/mm3      Basophils, Absolute 0.07 10*3/mm3      Immature Grans, Absolute 0.05 10*3/mm3      nRBC 0.0 /100 WBC         Imaging Results (Last 24 Hours)     Procedure Component Value Units Date/Time    CT Angiogram Chest [[id number]] Collected: 03/13/21 1524     Updated: 03/13/21 1534    Narrative:      EXAMINATION: CT ANGIOGRAM CHEST- 3/13/2021 3:24 PM CST     HISTORY: Chest tightness, dyspnea, hypoxemia     DOSE: 391 mGycm (Automatic exposure control technique was implemented in  an effort to keep the radiation dose as low as possible without  compromising image quality)     REPORT: Spiral CT of the chest was performed after administration of  intravenous contrast from the thoracic inlet through the upper abdomen  using CTA protocol. Reconstructed coronal and sagittal images were also  reviewed.     Comparison: There are no correlative imaging studies for comparison.     There is extensive streak artifact emanating from the oropharynx. The  contrast bolus is satisfactory, the pulmonary arteries are normal  caliber. There are multiple low-attenuation filling defects within the  right and left main pulmonary arteries as well as the first, second  third order branches compatible with acute pulmonary emboli. There is  straightening of the cardiac ventricular septum compatible with right  heart strain. Heart size is normal. Previous median sternotomy is noted.  Moderate calcified plaque is noted within the native coronary arteries.  The thoracic aorta is normal caliber, no evidence of aortic dissection  is identified. No intrathoracic lymphadenopathy is seen. Review of lung  windows demonstrates moderate respiratory motion artifact. There is no  pulmonary consolidation. No pneumothorax or pleural effusion is  identified. There is mild right basilar atelectasis. The visualized  upper  abdomen shows no acute abnormality. There is an exophytic cyst at  the upper pole the left kidney. This measures approximately 1.7 cm.  Review of bone windows is unremarkable.       Impression:      1. Extensive acute pulmonary emboli involving the right and left main  pulmonary arteries, first, second and third order pulmonary artery  branches bilaterally, with evidence of right heart strain. No saddle  embolus is identified.     REPORT called to Dr. Luque in the emergency department at the time of  dictation.              This report was finalized on 03/13/2021 15:31 by Dr. Je Cantor MD.    XR Chest 1 View [[id number]] Collected: 03/13/21 1238     Updated: 03/13/21 1244    Narrative:      EXAMINATION: XR CHEST 1 VW- 3/13/2021 12:38 PM CST     HISTORY: CHF/COPD Protocol.     REPORT: A frontal view the chest was obtained.     COMPARISON: Chest x-rays 12/7/2008.     There is moderate elevation of the right hemidiaphragm. There is mild  central vascular congestion. No pulmonary infiltrate or consolidation is  identified. Heart size is normal. There is evidence of previous median  sternotomy. No pneumothorax or pleural effusion is identified. The  osseous structures are unremarkable.       Impression:      Mild central vascular congestion with no evidence of overt  CHF. Prior median sternotomy is noted. No consolidating infiltrates.  This report was finalized on 03/13/2021 12:41 by Dr. Je Cantor MD.        I have personally reviewed and interpreted the radiology studies and ECG obtained at time of admission.     Assessment / Plan     Assessment:   Active Hospital Problems    Diagnosis    • **Acute bilateral pulmonary embolism with acute cor pulmonale (CMS/HCC)    • Hypoxia secondary to acute bilateral pulmonary emboli    • BMI 35.0-35.9,adult    • Benign essential HTN    • Hyperlipidemia    • Type 2 diabetes mellitus with hyperglycemia, with long-term current use of insulin (CMS/HCC)      Plan:   1.   "Presented to ER with sudden onset of shortness of breath persistent with mild chest tightness.  No diaphoresis.  Saturation 89% on room air.  D-dimer greater than 20  2.  CT angiogram of the chest extensive acute pulmonary emboli involving right and left main pulmonary arteries with evidence of right heart strain.  ER physician discussed with Dr. Blank and no immediate intervention at this time  3.  Lovenox 1 mg/kg.  Plan to transition to Eliquis 10 mg twice daily for 7 days then 5 mg twice daily.  4.  Echocardiogram today.  5.  Venous Dopplers bilateral lower extremities today.  6.  Oxygen to keep saturation greater than 90 to 92%.  7.  Repeat troponin and BMP at 1700  8.  Accu-Cheks with sliding scale insulin coverage.  Hold Metformin.  9.  Home medications reviewed and will be resumed if appropriate.  10.  Check hemoglobin A1c.  BMP, lipid panel in a.m.  11.  Zofran as needed for nausea.  Norco as needed for pain.    Code Status: Full code.  His wife is his power of      I discussed the patient's findings and my recommendations with Dr. Najera and patient    Estimated length of stay 2 days    Patient seen and examined by me on 03/13/2021 at 1615.  The above documentation resulted from a face-to-face encounter by me Nancy GIMENEZ, [facility].    Electronically signed by AMMY Rowell, 03/13/21, 16:37 CST.              Electronically signed by Jorden Najera MD at 03/13/21 2137          Physician Progress Notes (most recent note)      Nancy Mckeon APRN at 03/15/21 1113     Attestation signed by Dickson Suresh MD at 03/15/21 1717    I personally evaluated and examined the patient in conjunction with AMMY Claire and agree with the assessment, treatment plan, and disposition of the patient as recorded by her. My history, exam, and further recommendations are: Patient reports \"I feel great.\"  He is currently on 3 L by nasal cannula.  He states that he has been ambulating " in his room today and has been doing quite well from a breathing standpoint.  He does report some concern about going home in the sense that his bedroom is upstairs, and he has not yet tried to climb a flight of stairs following this diagnosis.  He is not tachycardic on examination.  Echocardiogram did not reveal any RV strain.  Patient transition from Lovenox to oral Eliquis.  He denies any previous history of clot.  No recent prolonged immobility.  No recent travel.  He denies any recent changes to his outpatient medications, other than adjustment of his insulin regimen (Basaglar).  Patient is hopeful to go home soon.  We discussed that we are being extra cautious, particularly given the amount of clot burden noted on CT imaging on arrival.  Please note the cardiology was contacted on admission with no acute indication for EK[facility].      Electronically signed by Dickson Suresh MD, 3/15/2021, 17:13 CDT.                         [facility]  INPATIENT PROGRESS NOTE    Length of Stay: 2  Date of Admission: 3/13/2021  Primary Care Physician: Pallavi Mtz MD    Subjective   Chief Complaint: Follow-up shortness of breath  HPI   Presented to ER 3/13 with sudden onset of shortness of breath that did not improve with rest.  D-dimer elevated and CT angiogram of the chest showed extensive acute bilateral pulmonary emboli.    Sitting up on side of bed.  Oxygen at 3 L and decreased to 2 L during assessment.  He denies nausea, vomiting or abdominal pain.  Denies chest pain or palpitations.  He denies increased shortness of breath.  He has been up in the room.  Has not walked in the hallway.  Denies leg pain.  Continue to wean oxygen today.  Discussed anticoagulation with patient as he is agreeable.  Patient reports shortness of breath has improved since admission.  Oxygen was on 5 L yesterday.    Review of Systems   Constitutional: Negative for appetite  change, chills, diaphoresis, fatigue, fever and unexpected weight change.   HENT: Negative for congestion and trouble swallowing.    Eyes: Negative for photophobia and visual disturbance.   Respiratory: Positive for shortness of breath. Negative for cough and wheezing.    Cardiovascular: Positive for leg swelling (Ankle swelling, chronic, unchanged). Negative for chest pain.   Gastrointestinal: Negative for abdominal distention, diarrhea, nausea and vomiting.   Endocrine: Negative for cold intolerance, heat intolerance and polyuria.   Genitourinary: Negative for dysuria, frequency and urgency.   Musculoskeletal: Negative for back pain and gait problem.   Skin: Negative for color change, pallor, rash and wound.   Allergic/Immunologic: Negative for immunocompromised state.   Neurological: Negative for weakness.   Hematological: Negative for adenopathy. Does not bruise/bleed easily.   Psychiatric/Behavioral: Negative for agitation, behavioral problems and confusion.     All pertinent negatives and positives are as above. All other systems have been reviewed and are negative unless otherwise stated.     Objective    Temp:  [97 °F (36.1 °C)-98.5 °F (36.9 °C)] 97.4 °F (36.3 °C)  Heart Rate:  [59-84] 84  Resp:  [12-20] 16  BP: (114-152)/(52-92) 114/72  Physical Exam  Vitals and nursing note reviewed.   Constitutional:       Appearance: He is obese.      Comments:  Sitting up on side of bed.  Oxygen at 2 L  HENT:      Head: Normocephalic and atraumatic.      Nose: No congestion.      Mouth/Throat:      Pharynx: No oropharyngeal exudate.   Eyes:      Extraocular Movements: Extraocular movements intact.      Pupils: Pupils are equal, round, and reactive to light.   Cardiovascular:      Rate and Rhythm: Normal rate and regular rhythm.      Heart sounds: No murmur.      Comments: Normal sinus rhythm  58-70 on telemetry  Pulmonary:      Comments: Oxygen 2 L.  Abdominal:      Palpations: Abdomen is soft.    Genitourinary:     Comments: Voiding.  Musculoskeletal:         General: No swelling or tenderness.      Cervical back: Normal range of motion and neck supple.   Skin:     General: Skin is warm and dry.   Neurological:      General: No focal deficit present.      Mental Status: He is alert and oriented to person, place, and time.   Psychiatric:         Mood and Affect: Mood normal.         Behavior: Behavior normal.         Thought Content: Thought content normal.         Judgment: Judgment normal.     Results Review:  I have reviewed the labs, radiology results, and diagnostic studies.    Laboratory Data:      Results from last 7 days   Lab Units 03/13/21  1234   WBC 10*3/mm3 10.50   HEMOGLOBIN g/dL 14.3   HEMATOCRIT % 42.4   PLATELETS 10*3/mm3 170        Results from last 7 days   Lab Units 03/15/21  0451 03/14/21  0301 03/13/21  1234   SODIUM mmol/L 139 142 139   POTASSIUM mmol/L 4.2 4.1 4.9   CHLORIDE mmol/L 104 103 103   CO2 mmol/L 27.0 24.0 24.0   BUN mg/dL 13 12 14   CREATININE mg/dL 1.27 1.01 1.27   GLUCOSE mg/dL 163* 128* 274*   CALCIUM mg/dL 8.8 8.9 9.4   ALT (SGPT) U/L  --   --  40       Culture Data:    No results found for: BLOODCX, URINECX, WOUNDCX, MRSACX, RESPCX, STOOLCX    Radiology Data:   Imaging Results (Last 7 Days)     Procedure Component Value Units Date/Time    US Venous Doppler Lower Extremity Bilateral (duplex) [[id number]] Collected: 03/15/21 0844     Updated: 03/15/21 0847    Narrative:      History: Swelling       Impression:      Impression: There is evidence of deep venous thrombosis in the right  lower extremity. The left lower extremity shows no evidence of deep  venous thrombosis. There is no evidence of superficial thrombophlebitis.     Comments: Bilateral lower extremity venous duplex exam was performed  using color Doppler flow, Doppler waveform analysis, and grayscale  imaging, with and without compression. There is evidence of deep venous  thrombosis in the right popliteal  vein. All other visualized veins show  no evidence of DVT. No thrombus is identified in the saphenofemoral  junctions and greater saphenous veins bilaterally.         This report was finalized on 03/15/2021 08:44 by Dr. Getachew Berger MD.    CT Angiogram Chest [[id number]] Collected: 03/13/21 1524     Updated: 03/13/21 1534    Narrative:      EXAMINATION: CT ANGIOGRAM CHEST- 3/13/2021 3:24 PM CST     HISTORY: Chest tightness, dyspnea, hypoxemia     DOSE: 391 mGycm (Automatic exposure control technique was implemented in  an effort to keep the radiation dose as low as possible without  compromising image quality)     REPORT: Spiral CT of the chest was performed after administration of  intravenous contrast from the thoracic inlet through the upper abdomen  using CTA protocol. Reconstructed coronal and sagittal images were also  reviewed.     Comparison: There are no correlative imaging studies for comparison.     There is extensive streak artifact emanating from the oropharynx. The  contrast bolus is satisfactory, the pulmonary arteries are normal  caliber. There are multiple low-attenuation filling defects within the  right and left main pulmonary arteries as well as the first, second  third order branches compatible with acute pulmonary emboli. There is  straightening of the cardiac ventricular septum compatible with right  heart strain. Heart size is normal. Previous median sternotomy is noted.  Moderate calcified plaque is noted within the native coronary arteries.  The thoracic aorta is normal caliber, no evidence of aortic dissection  is identified. No intrathoracic lymphadenopathy is seen. Review of lung  windows demonstrates moderate respiratory motion artifact. There is no  pulmonary consolidation. No pneumothorax or pleural effusion is  identified. There is mild right basilar atelectasis. The visualized  upper abdomen shows no acute abnormality. There is an exophytic cyst at  the upper pole the left kidney.  This measures approximately 1.7 cm.  Review of bone windows is unremarkable.       Impression:      1. Extensive acute pulmonary emboli involving the right and left main  pulmonary arteries, first, second and third order pulmonary artery  branches bilaterally, with evidence of right heart strain. No saddle  embolus is identified.     REPORT called to Dr. Luque in the emergency department at the time of  dictation.              This report was finalized on 03/13/2021 15:31 by Dr. Je Cantor MD.    XR Chest 1 View [[id number]] Collected: 03/13/21 1238     Updated: 03/13/21 1244    Narrative:      EXAMINATION: XR CHEST 1 VW- 3/13/2021 12:38 PM CST     HISTORY: CHF/COPD Protocol.     REPORT: A frontal view the chest was obtained.     COMPARISON: Chest x-rays 12/7/2008.     There is moderate elevation of the right hemidiaphragm. There is mild  central vascular congestion. No pulmonary infiltrate or consolidation is  identified. Heart size is normal. There is evidence of previous median  sternotomy. No pneumothorax or pleural effusion is identified. The  osseous structures are unremarkable.       Impression:      Mild central vascular congestion with no evidence of overt  CHF. Prior median sternotomy is noted. No consolidating infiltrates.  This report was finalized on 03/13/2021 12:41 by Dr. Je Cantor MD.        Transthoracic echocardiogram 3/14/2021  · Left ventricular ejection fraction appears to be greater than 70%. Left ventricular systolic function is hyperdynamic (EF > 70%).  · Estimated right ventricular systolic pressure from tricuspid regurgitation is moderately elevated (45-55 mmHg).  · The right ventricular cavity is mild to moderately dilated, but appears to have normal systolic function. There is no Sorto's sign (which, if present, is rather specific for acute RV strain from PE).  · No significant valvular pathology.     Intake/Output    Intake/Output Summary (Last 24 hours) at 3/15/2021  1113  Last data filed at 3/15/2021 0820  Gross per 24 hour   Intake 600 ml   Output 1430 ml   Net -830 ml       Scheduled Meds  amLODIPine-lisinopril 10-20 mg combo dose, , Oral, Q24H  apixaban, 10 mg, Oral, Q12H   Followed by  [START ON 3/22/2021] apixaban, 5 mg, Oral, Q12H  atorvastatin, 80 mg, Oral, Nightly  insulin detemir, 20 Units, Subcutaneous, Nightly  insulin lispro, 2-7 Units, Subcutaneous, TID AC  metoprolol tartrate, 50 mg, Oral, Q12H  sodium chloride, 10 mL, Intravenous, Q12H        I have reviewed the patient current medications.     Assessment/Plan     Active Hospital Problems    Diagnosis    • **Pulmonary embolism without acute cor pulmonale (CMS/HCC)    • Hypoxia secondary to acute bilateral pulmonary emboli    • Deep vein thrombosis (DVT) of popliteal vein of right lower extremity (CMS/HCC)    • BMI 35.0-35.9,adult    • Benign essential HTN    • Hyperlipidemia    • Type 2 diabetes mellitus with hyperglycemia, with long-term current use of insulin (CMS/HCC)        Plan:  1.  To ER 3/13 with sudden onset of shortness of breath persistent with mild chest tightness.  No diaphoresis.  Saturation 89% on room air.  D-dimer greater than 20  2.  CT angiogram of the chest extensive acute pulmonary emboli involving right and left main pulmonary arteries with evidence of right heart strain.  ER physician discussed with Dr. Blank and no immediate intervention.   3.  Lovenox 1 mg/kg on admission.  Transitioned to Eliquis 10 mg twice daily for 7 days beginning today.  Eliquis 5 mg twice daily beginning 3/22.  4.  Echo 3/14 reported  EF greater than 70%.  RVSP pressure moderately elevated 45-55.  Right ventricular cavity is mild to moderately dilated but appears to have normal systolic function.  No Sorto sign (if present specific for acute RV strain from PE).  No valvular pathology.  5.  Venous Dopplers noted thrombus right popliteal vein.  6.  Wean oxygen as tolerated.  5 L yesterday down to 2 L today.  7.   Hemoglobin A1c 8.7.  Sliding scale insulin coverage.  Glucoses 183, 163, 192.  Hold Metformin.  Will increase Basaglar to 30 units nightly, home dose.  8.  Blood pressure 114/72, 142/57.  Confirmed that patient takes both amlodipine/benazepril and additional dose of benazepril 5 mg.  9.  Ambulate.    His wife will assist with decision-making if he is unable to make decisions for himself  The above documentation resulted from a face-to-face encounter by me Nancy GIMENEZ, [facility].    Discharge Planning: I expect patient to be discharged to home in 1 day.    Electronically signed by AMMY Rowell, 3/15/2021, 11:13 CDT.        Electronically signed by Dickson Suresh MD at 03/15/21 5688       Consult Notes (most recent note)    No notes of this type exist for this encounter.         Physical Therapy Notes (most recent note)    No notes exist for this encounter.            Respiratory Therapy Notes (most recent note)      Roslyn Nelson RRT at 03/16/21 0903          Exercise Oximetry    Patient Name:Cordell Carreon   MRN: [id number]   Date: 03/16/21             ROOM AIR BASELINE   SpO2% 88   Heart Rate 70   Blood Pressure      EXERCISE ON ROOM AIR SpO2% EXERCISE ON O2 @ 2  LPM SpO2%   1 MINUTE  1 MINUTE 93   2 MINUTES   87 2 MINUTES 94   3 MINUTES  3 MINUTES 94   4 MINUTES  4 MINUTES    5 MINUTES  5 MINUTES    6 MINUTES  6 MINUTES               Distance Walked   120 feet Distance Walked      120 feet   Dyspnea (George Scale)   Dyspnea (George Scale)   Fatigue (George Scale)   Fatigue (George Scale)   SpO2% Post Exercise   SpO2% Post Exercise  95   HR Post Exercise   HR Post Exercise  67   Time to Recovery   Time to Recovery 10 minutes     Comments:   Pulse ox room air at rest x 5 minutes 88%  Pulse ox NC at 2 lpm at rest 93%  Pulse ox room air walking 120 feet 87%  Pulse ox on 2 lpm/nc walking 120 feet 94%    Electronically signed by Roslyn Nelson, KRISH at 03/16/21 0998

## 2021-03-16 NOTE — DISCHARGE SUMMARY
[facility]  DISCHARGE SUMMARY     Date of Admission: 3/13/2021  Date of Discharge:  3/16/2021  Primary Care Physician: Pallavi Mtz MD    Presenting Problem/History of Present Illness:  Bilateral pulmonary embolism (CMS/HCC) [I26.99]     Discharge Diagnoses:  Active Hospital Problems    Diagnosis    • **Pulmonary embolism without acute cor pulmonale (CMS/HCC)    • Hypoxia secondary to acute bilateral pulmonary emboli    • Deep vein thrombosis (DVT) of popliteal vein of right lower extremity (CMS/HCC)    • BMI 35.0-35.9,adult    • Benign essential HTN    • Hyperlipidemia    • Type 2 diabetes mellitus with hyperglycemia, with long-term current use of insulin (CMS/HCC)      Chief Complaint on Day of Discharge: No complaints    History of Present Illness on Day of Discharge:   Patient was seen earlier while ambulating the hallway with respiratory therapy.  Saturation was 88% on room air decreased to 87% with activity.  Have discussed oxygen 2 L with patient.  He denies nausea, vomiting or abdominal pain.  Denies chest pain or palpitations.  Reports shortness of breath significantly improved since admission.  Denies lower extremity pain.  Ready for discharge today.    Consults: None    Procedures Performed: none    Pertinent Test Results:      Transthoracic echocardiogram 3/14/2021  · Left ventricular ejection fraction appears to be greater than 70%. Left ventricular systolic function is hyperdynamic (EF > 70%).  · Estimated right ventricular systolic pressure from tricuspid regurgitation is moderately elevated (45-55 mmHg).  · The right ventricular cavity is mild to moderately dilated, but appears to have normal systolic function. There is no Sorto's sign (which, if present, is rather specific for acute RV strain from PE).  · No significant valvular pathology.                                                                                                      ROOM AIR BASELINE   SpO2% 88   Heart Rate 70   Blood Pressure       EXERCISE ON ROOM AIR SpO2% EXERCISE ON O2 @ 2  LPM SpO2%   1 MINUTE   1 MINUTE 93   2 MINUTES   87 2 MINUTES 94   3 MINUTES   3 MINUTES 94   4 MINUTES   4 MINUTES     5 MINUTES   5 MINUTES     6 MINUTES   6 MINUTES                                                                                                                   Distance Walked   120 feet Distance Walked      120 feet   Dyspnea (George Scale)   Dyspnea (George Scale)   Fatigue (George Scale)   Fatigue (George Scale)   SpO2% Post Exercise   SpO2% Post Exercise  95   HR Post Exercise   HR Post Exercise  67   Time to Recovery   Time to Recovery 10 minutes      Comments:   Pulse ox room air at rest x 5 minutes 88%  Pulse ox NC at 2 lpm at rest 93%  Pulse ox room air walking 120 feet 87%  Pulse ox on 2 lpm/nc walking 120 feet 94%              Laboratory Data Last 7 Days:    Lab Results (last 7 days)     Procedure Component Value Units Date/Time    POC Glucose Once [[id number]]  (Abnormal) Collected: 03/16/21 0828    Specimen: Blood Updated: 03/16/21 0842     Glucose 184 mg/dL      Comment: : [id number] Angelito ChavezsilvinoMeter ID: [id number]       Basic Metabolic Panel [[id number]]  (Abnormal) Collected: 03/16/21 0439    Specimen: Blood Updated: 03/16/21 0531     Glucose 172 mg/dL      BUN 14 mg/dL      Creatinine 1.02 mg/dL      Sodium 139 mmol/L      Potassium 3.9 mmol/L      Chloride 104 mmol/L      CO2 24.0 mmol/L      Calcium 8.8 mg/dL      eGFR Non African Amer 71 mL/min/1.73      BUN/Creatinine Ratio 13.7     Anion Gap 11.0 mmol/L     Narrative:      GFR Normal >60  Chronic Kidney Disease <60  Kidney Failure <15      POC Glucose Once [[id number]]  (Abnormal) Collected: 03/15/21 2058    Specimen: Blood Updated: 03/15/21 2109     Glucose 229 mg/dL      Comment: : [id number] Page AlexisMeter ID: [id number]       POC Glucose Once [[id number]]  (Abnormal) Collected: 03/15/21 1617    Specimen:  Blood Updated: 03/15/21 1628     Glucose 222 mg/dL      Comment: : [id number] Korina AnnMeter ID: [id number]       POC Glucose Once [[id number]]  (Abnormal) Collected: 03/15/21 1215    Specimen: Blood Updated: 03/15/21 1228     Glucose 228 mg/dL      Comment: : [id number] Angelito ChavezssMeter ID: [id number]       POC Glucose Once [[id number]]  (Abnormal) Collected: 03/15/21 0836    Specimen: Blood Updated: 03/15/21 0852     Glucose 183 mg/dL      Comment: : [id number] Angelito ChavezssMeter ID: [id number]       Basic Metabolic Panel [[id number]]  (Abnormal) Collected: 03/15/21 0451    Specimen: Blood Updated: 03/15/21 0531     Glucose 163 mg/dL      BUN 13 mg/dL      Creatinine 1.27 mg/dL      Sodium 139 mmol/L      Potassium 4.2 mmol/L      Chloride 104 mmol/L      CO2 27.0 mmol/L      Calcium 8.8 mg/dL      eGFR Non African Amer 55 mL/min/1.73      BUN/Creatinine Ratio 10.2     Anion Gap 8.0 mmol/L     Narrative:      GFR Normal >60  Chronic Kidney Disease <60  Kidney Failure <15      POC Glucose Once [[id number]]  (Abnormal) Collected: 03/14/21 1930    Specimen: Blood Updated: 03/14/21 1958     Glucose 192 mg/dL      Comment: : [id number] Portia FrazieritaMeter ID: [id number]       POC Glucose Once [[id number]]  (Abnormal) Collected: 03/14/21 1616    Specimen: Blood Updated: 03/14/21 1628     Glucose 211 mg/dL      Comment: : [id number] Zoe OsborniaMeter ID: [id number]       POC Glucose Once [[id number]]  (Abnormal) Collected: 03/14/21 1126    Specimen: Blood Updated: 03/14/21 1151     Glucose 242 mg/dL      Comment: : [id number] Cezar RiveraaMeter ID: [id number]       POC Glucose Once [[id number]]  (Abnormal) Collected: 03/14/21 0816    Specimen: Blood Updated: 03/14/21 0817     Glucose 198 mg/dL      Comment: : LUIS Avila ID: [id number]       Basic Metabolic Panel [[id number]]  (Abnormal) Collected: 03/14/21 0301    Specimen: Blood Updated: 03/14/21 0506     Glucose 128 mg/dL       BUN 12 mg/dL      Creatinine 1.01 mg/dL      Sodium 142 mmol/L      Potassium 4.1 mmol/L      Comment: Slight hemolysis detected by analyzer. Results may be affected.        Chloride 103 mmol/L      CO2 24.0 mmol/L      Calcium 8.9 mg/dL      eGFR Non African Amer 72 mL/min/1.73      BUN/Creatinine Ratio 11.9     Anion Gap 15.0 mmol/L     Narrative:      GFR Normal >60  Chronic Kidney Disease <60  Kidney Failure <15      POC Glucose Once [[id number]]  (Abnormal) Collected: 03/13/21 2044    Specimen: Blood Updated: 03/13/21 2056     Glucose 181 mg/dL      Comment: : [id number] Cobbenjie Condon ID: [id number]       POC Glucose Once [[id number]]  (Abnormal) Collected: 03/13/21 1810    Specimen: Blood Updated: 03/13/21 1811     Glucose 160 mg/dL      Comment: : [facility] KyleMeter ID: [id number]       Hemoglobin A1c [[id number]]  (Abnormal) Collected: 03/13/21 1234    Specimen: Blood Updated: 03/13/21 1744     Hemoglobin A1C 8.70 %     Narrative:      Hemoglobin A1C Ranges:    Increased Risk for Diabetes  5.7% to 6.4%  Diabetes                     >= 6.5%  Diabetic Goal                < 7.0%    Lipid Panel [[id number]]  (Abnormal) Collected: 03/13/21 1646    Specimen: Blood Updated: 03/13/21 1736     Total Cholesterol 233 mg/dL      Triglycerides 254 mg/dL      HDL Cholesterol 31 mg/dL      LDL Cholesterol  155 mg/dL      VLDL Cholesterol 47 mg/dL      LDL/HDL Ratio 4.88    Narrative:      Cholesterol Reference Ranges  (U.S. Department of Health and Human Services ATP III Classifications)    Desirable          <200 mg/dL  Borderline High    200-239 mg/dL  High Risk          >240 mg/dL      Triglyceride Reference Ranges  (U.S. Department of Health and Human Services ATP III Classifications)    Normal           <150 mg/dL  Borderline High  150-199 mg/dL  High             200-499 mg/dL  Very High        >500 mg/dL    HDL Reference Ranges  (U.S. Department of Health and Human Services ATP III  Classifcations)    Low     <40 mg/dl (major risk factor for CHD)  High    >60 mg/dl ('negative' risk factor for CHD)        LDL Reference Ranges  (U.S. Department of Health and Human Services ATP III Classifcations)    Optimal          <100 mg/dL  Near Optimal     100-129 mg/dL  Borderline High  130-159 mg/dL  High             160-189 mg/dL  Very High        >189 mg/dL    Troponin [[id number]]  (Abnormal) Collected: 03/13/21 1646    Specimen: Blood Updated: 03/13/21 1728     Troponin T 0.031 ng/mL     Narrative:      Troponin T Reference Range:  <= 0.03 ng/mL-   Negative for AMI  >0.03 ng/mL-     Abnormal for myocardial necrosis.  Clinicians would have to utilize clinical acumen, EKG, Troponin and serial changes to determine if it is an Acute Myocardial Infarction or myocardial injury due to an underlying chronic condition.       Results may be falsely decreased if patient taking Biotin.      BNP [[id number]]  (Normal) Collected: 03/13/21 1646    Specimen: Blood Updated: 03/13/21 1725     proBNP 374.7 pg/mL     Narrative:      Among patients with dyspnea, NT-proBNP is highly sensitive for the detection of acute congestive heart failure. In addition NT-proBNP of <300 pg/ml effectively rules out acute congestive heart failure with 99% negative predictive value.    Results may be falsely decreased if patient taking Biotin.      [place] Draw [[id number]] Collected: 03/13/21 1234    Specimen: Blood from Arm, Left Updated: 03/13/21 1530    Narrative:      The following orders were created for panel order [place] Draw.  Procedure                               Abnormality         Status                     ---------                               -----------         ------                     Light Blue Top[[id number]]                                   Final result               Green Top (Gel)[[id number]]                                  Final result               Lavender Top[[id number]]                                     Final  result               Red Top[[id number]]                                          Final result                 Please view results for these tests on the individual orders.    Light Blue Top [[id number]] Collected: 03/13/21 1424    Specimen: Blood from Arm, Right Updated: 03/13/21 1530     Extra Tube hold for add-on     Comment: Auto resulted       D-dimer, Quantitative [[id number]]  (Abnormal) Collected: 03/13/21 1424    Specimen: Blood from Arm, Right Updated: 03/13/21 1518     D-Dimer, Quantitative >20.00 mg/L (FEU)     Narrative:      Reference Range is 0-0.50 mg/L FEU. However, results <0.50 mg/L FEU tends to rule out DVT or PE. Results >0.50 mg/L FEU are not useful in predicting absence or presence of DVT or PE.      Lavender Top [[id number]] Collected: 03/13/21 1234    Specimen: Blood from Arm, Left Updated: 03/13/21 1345     Extra Tube hold for add-on     Comment: Auto resulted       Green Top (Gel) [[id number]] Collected: 03/13/21 1234    Specimen: Blood from Arm, Left Updated: 03/13/21 1345     Extra Tube Hold for add-ons.     Comment: Auto resulted.       Red Top [[id number]] Collected: 03/13/21 1234    Specimen: Blood from Arm, Left Updated: 03/13/21 1345     Extra Tube Hold for add-ons.     Comment: Auto resulted.       COVID-19,[facility]-HOUSE,Nasal Swab No Transport Media 3-4 HR TAT - Swab, Nasal Cavity [[id number]]  (Normal) Collected: 03/13/21 1227    Specimen: Swab from Nasal Cavity Updated: 03/13/21 1323     COVID19 Not Detected    Narrative:      Fact sheet for providers: https://www.fda.gov/media/559318/download     Fact sheet for patients: https://www.fda.gov/media/810961/download    Test performed by PCR.    Consider negative results in combination with clinical observations, patient history, and epidemiological information.    Comprehensive Metabolic Panel [[id number]]  (Abnormal) Collected: 03/13/21 1234    Specimen: Blood from Arm, Left Updated: 03/13/21 1310     Glucose 274 mg/dL      BUN 14  mg/dL      Creatinine 1.27 mg/dL      Sodium 139 mmol/L      Potassium 4.9 mmol/L      Chloride 103 mmol/L      CO2 24.0 mmol/L      Calcium 9.4 mg/dL      Total Protein 7.6 g/dL      Albumin 4.30 g/dL      ALT (SGPT) 40 U/L      AST (SGOT) 38 U/L      Alkaline Phosphatase 103 U/L      Total Bilirubin 1.0 mg/dL      eGFR Non African Amer 55 mL/min/1.73      Globulin 3.3 gm/dL      A/G Ratio 1.3 g/dL      BUN/Creatinine Ratio 11.0     Anion Gap 12.0 mmol/L     Narrative:      GFR Normal >60  Chronic Kidney Disease <60  Kidney Failure <15      Troponin [[id number]]  (Normal) Collected: 03/13/21 1234    Specimen: Blood from Arm, Left Updated: 03/13/21 1308     Troponin T 0.020 ng/mL     Narrative:      Troponin T Reference Range:  <= 0.03 ng/mL-   Negative for AMI  >0.03 ng/mL-     Abnormal for myocardial necrosis.  Clinicians would have to utilize clinical acumen, EKG, Troponin and serial changes to determine if it is an Acute Myocardial Infarction or myocardial injury due to an underlying chronic condition.       Results may be falsely decreased if patient taking Biotin.      BNP [[id number]]  (Normal) Collected: 03/13/21 1234    Specimen: Blood from Arm, Left Updated: 03/13/21 1307     proBNP 199.0 pg/mL     Narrative:      Among patients with dyspnea, NT-proBNP is highly sensitive for the detection of acute congestive heart failure. In addition NT-proBNP of <300 pg/ml effectively rules out acute congestive heart failure with 99% negative predictive value.    Results may be falsely decreased if patient taking Biotin.      Blood Gas, Arterial - [[id number]]  (Abnormal) Collected: 03/13/21 1242    Specimen: Arterial Blood Updated: 03/13/21 1247     Site Right Radial     Carlos's Test Positive     pH, Arterial 7.339 pH units      Comment: 84 Value below reference range        pCO2, Arterial 36.7 mm Hg      pO2, Arterial 77.7 mm Hg      Comment: 84 Value below reference range        HCO3, Arterial 19.7 mmol/L      Comment:  84 Value below reference range        Base Excess, Arterial -5.4 mmol/L      Comment: 84 Value below reference range        O2 Saturation, Arterial 95.1 %      Temperature 37.0 C      Barometric Pressure for Blood Gas 759 mmHg      Modality Nasal Cannula     Flow Rate 2.0 lpm      Ventilator Mode NA     Collected by [id number]     Comment: Meter: [id number]     :  [id number]        pCO2, Temperature Corrected 36.7 mm Hg      pH, Temp Corrected 7.339 pH Units      pO2, Temperature Corrected 77.7 mm Hg     CBC & Differential [[id number]]  (Abnormal) Collected: 03/13/21 1234    Specimen: Blood from Arm, Left Updated: 03/13/21 1246    Narrative:      The following orders were created for panel order CBC & Differential.  Procedure                               Abnormality         Status                     ---------                               -----------         ------                     CBC Auto Differential[[id number]]        Abnormal            Final result                 Please view results for these tests on the individual orders.    CBC Auto Differential [[id number]]  (Abnormal) Collected: 03/13/21 1234    Specimen: Blood from Arm, Left Updated: 03/13/21 1246     WBC 10.50 10*3/mm3      RBC 5.11 10*6/mm3      Hemoglobin 14.3 g/dL      Hematocrit 42.4 %      MCV 83.0 fL      MCH 28.0 pg      MCHC 33.7 g/dL      RDW 13.6 %      RDW-SD 40.5 fl      MPV 9.5 fL      Platelets 170 10*3/mm3      Neutrophil % 66.1 %      Lymphocyte % 20.8 %      Monocyte % 8.9 %      Eosinophil % 3.0 %      Basophil % 0.7 %      Immature Grans % 0.5 %      Neutrophils, Absolute 6.96 10*3/mm3      Lymphocytes, Absolute 2.18 10*3/mm3      Monocytes, Absolute 0.93 10*3/mm3      Eosinophils, Absolute 0.31 10*3/mm3      Basophils, Absolute 0.07 10*3/mm3      Immature Grans, Absolute 0.05 10*3/mm3      nRBC 0.0 /100 WBC         Imaging Results (All)     Procedure Component Value Units Date/Time    US Venous Doppler Lower Extremity  Bilateral (duplex) [[id number]] Collected: 03/15/21 0844     Updated: 03/15/21 0847    Narrative:      History: Swelling       Impression:      Impression: There is evidence of deep venous thrombosis in the right  lower extremity. The left lower extremity shows no evidence of deep  venous thrombosis. There is no evidence of superficial thrombophlebitis.     Comments: Bilateral lower extremity venous duplex exam was performed  using color Doppler flow, Doppler waveform analysis, and grayscale  imaging, with and without compression. There is evidence of deep venous  thrombosis in the right popliteal vein. All other visualized veins show  no evidence of DVT. No thrombus is identified in the saphenofemoral  junctions and greater saphenous veins bilaterally.         This report was finalized on 03/15/2021 08:44 by Dr. Getachew Berger MD.    CT Angiogram Chest [[id number]] Collected: 03/13/21 1524     Updated: 03/13/21 1534    Narrative:      EXAMINATION: CT ANGIOGRAM CHEST- 3/13/2021 3:24 PM CST     HISTORY: Chest tightness, dyspnea, hypoxemia     DOSE: 391 mGycm (Automatic exposure control technique was implemented in  an effort to keep the radiation dose as low as possible without  compromising image quality)     REPORT: Spiral CT of the chest was performed after administration of  intravenous contrast from the thoracic inlet through the upper abdomen  using CTA protocol. Reconstructed coronal and sagittal images were also  reviewed.     Comparison: There are no correlative imaging studies for comparison.     There is extensive streak artifact emanating from the oropharynx. The  contrast bolus is satisfactory, the pulmonary arteries are normal  caliber. There are multiple low-attenuation filling defects within the  right and left main pulmonary arteries as well as the first, second  third order branches compatible with acute pulmonary emboli. There is  straightening of the cardiac ventricular septum compatible with  right  heart strain. Heart size is normal. Previous median sternotomy is noted.  Moderate calcified plaque is noted within the native coronary arteries.  The thoracic aorta is normal caliber, no evidence of aortic dissection  is identified. No intrathoracic lymphadenopathy is seen. Review of lung  windows demonstrates moderate respiratory motion artifact. There is no  pulmonary consolidation. No pneumothorax or pleural effusion is  identified. There is mild right basilar atelectasis. The visualized  upper abdomen shows no acute abnormality. There is an exophytic cyst at  the upper pole the left kidney. This measures approximately 1.7 cm.  Review of bone windows is unremarkable.       Impression:      1. Extensive acute pulmonary emboli involving the right and left main  pulmonary arteries, first, second and third order pulmonary artery  branches bilaterally, with evidence of right heart strain. No saddle  embolus is identified.     REPORT called to Dr. Luque in the emergency department at the time of  dictation.     This report was finalized on 03/13/2021 15:31 by Dr. Je Cantor MD.    XR Chest 1 View [[id number]] Collected: 03/13/21 1238     Updated: 03/13/21 1244    Narrative:      EXAMINATION: XR CHEST 1 VW- 3/13/2021 12:38 PM CST     HISTORY: CHF/COPD Protocol.     REPORT: A frontal view the chest was obtained.     COMPARISON: Chest x-rays 12/7/2008.     There is moderate elevation of the right hemidiaphragm. There is mild  central vascular congestion. No pulmonary infiltrate or consolidation is  identified. Heart size is normal. There is evidence of previous median  sternotomy. No pneumothorax or pleural effusion is identified. The  osseous structures are unremarkable.       Impression:      Mild central vascular congestion with no evidence of overt  CHF. Prior median sternotomy is noted. No consolidating infiltrates.  This report was finalized on 03/13/2021 12:41 by Dr. Je Cantor MD.        [facility]  Course  Patient is a 75 y.o. male presented to [facility] emergency room 3/13/2021 with complaints of sudden onset of shortness of breath that started around breakfast.  He reported chest tightness and shortness of breath that lasted for 2 hours.  Patient denied radiation of chest pain, nausea, vomiting or abdominal pain.  No palpitations or diaphoresis.  Chronic mild lower edema in feet but denied recent weight gain or lower extremity edema.  No recent sick contacts or travel.  Patient denied any previous history of blood clots and no family history of blood clots.  He reported having the flu or cold 1 month ago and was tested negative for Covid.  Patient has had 4 - Covid testing.  He reports up until Covid restrictions he was very active. PO2 89 on room air he was placed on nasal cannula. D-dimer greater than 20, glucose 274, creatinine 1.27, troponin negative, proBNP 199.  Chest x-ray reported mild central vascular congestion with no evidence of overt CHF.  No consolidating infiltrates.  CT scan of the chest reported extensive acute pulmonary emboli involving the right and left main pulmonary arteries, first, second and third order pulmonary artery branches bilaterally, with evidence of right heart strain.  No saddle embolus identified.  Dr. Luque, ER physician discussed case with Dr. Blank who noted patient did not need immediate intervention.  Lovenox 1 mg/kg given in the emergency room.    He was admitted to the coronary care unit with acute bilateral pulmonary emboli and hypoxia secondary to acute bilateral pulmonary emboli.  Lovenox 1 mg/kg every 12 hours continued.  Patient remained on oxygen requiring 4 L.  Echocardiogram 3/14 reported ejection fraction greater than 70%. RVSP pressure moderately elevated 45-55.  Right ventricular cavity is mild to moderately dilated but appears to have normal systolic function.  No Sorto sign (if present specific for acute RV strain from PE).  No valvular  pathology.  Venous Dopplers bilateral lower extremities 3/14 noted thrombus visualized in the right popliteal vein.  Patient did require oxygen up to 5 L to keep saturation greater than 90 to 92%.  Repeat troponins 0.031.  Patient denied any chest pain.  Metformin held on admission and he was placed on sliding scale insulin coverage.      He was transferred out of the critical care unit on 3/14.  Patient was requiring oxygen at 4 L and was able to wean to 2 L.  Lovenox 1 mg/kg every 12 hours transitioned to Eliquis 10 mg twice daily for 7 days beginning 3/15/2021, then Eliquis 5 mg twice daily beginning 3/22.  Eliquis starter pack given at discharge.    Patient required oxygen at 5 L temporarily and was weaned to 2 L.  Walking oximetry 3/16 O2 saturation 88% on room air prior to ambulation.  87% with activity.  Oxygen 2 L continuously ordered at discharge.  Patient denied shortness of breath reported he felt much better breathing than upon admission.    Hemoglobin A1c 8.7.  Metformin held on admission.  Placed on sliding scale insulin.  Basaglar resumed.    Confirmed blood pressure medications and patient does take both amlodipine/benazepril and additional dose of benazepril 5 mg per primary care provider.  Blood pressure 138/76 at discharge.    On 3/16/2021 he is stable for discharge.  He has been weaned oxygen 2 L and will require oxygen at 2 L continuously for period time after discharge.  He continues to ambulate and reports that shortness of breath has significantly improved.  He denies leg pain.  Denies chest pain, palpitations.  Denies nausea, vomiting or abdominal pain.  He has been structured on Eliquis.  Starter pack given at discharge.  We will continue aspirin as patient has history of coronary artery disease with coronary artery bypass grafting.  Follow-up with Dr. Mtz 3/22/2021.    Physical Exam on Discharge:  /76 (BP Location: Right arm, Patient Position: Lying)   Pulse 61   Temp 97.7 °F  "(36.5 °C) (Oral)   Resp 16   Ht 172.7 cm (68\")   Wt 100 kg (220 lb 12.8 oz)   SpO2 92%   BMI 33.57 kg/m²   Physical Exam  Vitals and nursing note reviewed.   Constitutional:       Appearance: He is obese.      Comments: Oxygen 2 L.  Walking in hallway.  Denies complaints.  No distress.   HENT:      Head: Normocephalic and atraumatic.      Nose: No congestion.      Mouth/Throat:      Pharynx: No oropharyngeal exudate.   Eyes:      Extraocular Movements: Extraocular movements intact.      Pupils: Pupils are equal, round, and reactive to light.   Cardiovascular:      Rate and Rhythm: Normal rate and regular rhythm.      Heart sounds: No murmur.      Comments: Normal sinus rhythm 57-67 on telemetry  Pulmonary:      Breath sounds: No wheezing, rhonchi or rales.      Comments: Oxygen 2 L  Abdominal:      Palpations: Abdomen is soft.   Genitourinary:     Comments: Voiding.  Musculoskeletal:         General: No swelling or tenderness.      Cervical back: Normal range of motion and neck supple.   Skin:     General: Skin is warm and dry.   Neurological:      General: No focal deficit present.      Mental Status: He is alert and oriented to person, place, and time.   Psychiatric:         Mood and Affect: Mood normal.         Behavior: Behavior normal.         Thought Content: Thought content normal.         Judgment: Judgment normal.       Condition on Discharge: Stable    Discharge Disposition: Home with family    Discharge Diet:   Diet Instructions     Diet: Consistent Carbohydrate      Discharge Diet: Consistent Carbohydrate        Activity at Discharge:   Activity Instructions     Activity as Tolerated          Discharge Care Plan / Instructions:   1.  For worsening shortness of breath seek medical attention  2.  Eliquis 10 mg twice daily for 7 days begin 3/15/2021.  Then Eliquis 5 mg twice daily beginning 3/22/2021.  Starter pack ordered at discharge.  3.  Oxygen 2 L continuously.  Saturation 88% on room air, 87% " with activity.  4.  Follow-up with Dr. Mtz 1 week.  5.  Continue aspirin with history of coronary artery disease    Discharge Medications:     Discharge Medications      New Medications      Instructions Start Date   Eliquis DVT/PE Starter Pack tablet therapy pack  Generic drug: Apixaban Starter Pack   5 mg, Oral, Take As Directed         Continue These Medications      Instructions Start Date   amLODIPine-benazepril 10-20 MG per capsule  Commonly known as: LOTREL   TAKE 1 CAPSULE BY MOUTH EVERY DAY      aspirin 81 MG EC tablet   81 mg, Oral, Daily      atorvastatin 80 MG tablet  Commonly known as: LIPITOR   TAKE 1 TABLET BY MOUTH EVERY DAY      benazepril 5 MG tablet  Commonly known as: LOTENSIN   TAKE 1 TABLET BY MOUTH EVERY DAY      glimepiride 2 MG tablet  Commonly known as: AMARYL   TAKE 1 TABLET BY MOUTH EVERY DAY BEFORE BREAKFAST      Insulin Glargine 100 UNIT/ML injection pen  Commonly known as: BASAGLAR KWIKPEN   INJECT 30 UNITS UNDER THE SKIN INTO THE APPROPRIATE AREA AS DIRECTED EVERY NIGHT.      metFORMIN 1000 MG tablet  Commonly known as: GLUCOPHAGE   TAKE 1 TABLET BY MOUTH TWICE A DAY WITH MEALS      metoprolol tartrate 50 MG tablet  Commonly known as: LOPRESSOR   TAKE 1 TABLET BY MOUTH EVERY 12 HOURS         Stop These Medications    Aleve 220 MG tablet  Generic drug: naproxen sodium     fish oil 1000 MG capsule capsule          Follow-up Appointments:    Contact information for follow-up providers     Pallavi Mtz MD. Go on 3/22/2021.    Specialties: Family Medicine, Emergency Medicine, Urgent Care  Why: @ 11:00 AM  Contact information:  [address] [address]  [phone]                   Contact information for after-discharge care     Durable Medical Equipment     [facility] .    Service: Durable Medical Equipment  Contact information:  [address] [place] [facility] [address]  [phone]                           Future Appointments:  Future  Appointments   Date Time Provider Department Center   3/22/2021 11:00 AM Pallavi Mtz MD [facility]   6/1/2021  9:45 AM Pallavi Mtz MD [facility]     Test Results Pending at Discharge: None    The above documentation resulted from a face-to-face encounter by me Nancy GIMENEZ, [facility].    Electronically signed by AMMY Rowell, 3/16/2021, 11:21 CDT.    Time: This discharge process required greater than 35 minutes for completion.    Plan discussed with Dr. Suresh and patient.    Time spent in face-to-face evaluation, chart review, planning and education greater than 35 minutes.

## 2021-03-16 NOTE — PLAN OF CARE
Problem: Adult Inpatient Plan of Care  Goal: Plan of Care Review  Recent Flowsheet Documentation  Taken 3/16/2021 0436 by Cecile Hodges RN  Progress: improving  Plan of Care Reviewed With: patient  Outcome Summary: Patient has no c/o pain. On 3L of O2, O2 saturation is 93%. SB/NSR 57-67 on tele. VSS. Safety maintained. Will notify MD of any changes.

## 2021-03-16 NOTE — PROGRESS NOTES
Exercise Oximetry    Patient Name:Cordell Carreon   MRN: [id number]   Date: 03/16/21             ROOM AIR BASELINE   SpO2% 88   Heart Rate 70   Blood Pressure      EXERCISE ON ROOM AIR SpO2% EXERCISE ON O2 @ 2  LPM SpO2%   1 MINUTE  1 MINUTE 93   2 MINUTES   87 2 MINUTES 94   3 MINUTES  3 MINUTES 94   4 MINUTES  4 MINUTES    5 MINUTES  5 MINUTES    6 MINUTES  6 MINUTES               Distance Walked   120 feet Distance Walked      120 feet   Dyspnea (George Scale)   Dyspnea (George Scale)   Fatigue (George Scale)   Fatigue (George Scale)   SpO2% Post Exercise   SpO2% Post Exercise  95   HR Post Exercise   HR Post Exercise  67   Time to Recovery   Time to Recovery 10 minutes     Comments:   Pulse ox room air at rest x 5 minutes 88%  Pulse ox NC at 2 lpm at rest 93%  Pulse ox room air walking 120 feet 87%  Pulse ox on 2 lpm/nc walking 120 feet 94%

## 2021-03-17 ENCOUNTER — TRANSITIONAL CARE MANAGEMENT TELEPHONE ENCOUNTER (OUTPATIENT)
Dept: CALL CENTER | Facility: HOSPITAL | Age: 76
End: 2021-03-17

## 2021-03-17 NOTE — OUTREACH NOTE
Call Center TCM Note      Responses   [facility] patient discharged from?  [place]   Does the patient have one of the following disease processes/diagnoses(primary or secondary)?  Other   TCM attempt successful?  Yes   Call start time  1041   Call end time  1044   Discharge diagnosis  Pulmonary embolism without acute cor pulmonale    Meds reviewed with patient/caregiver?  Yes   Is the patient having any side effects they believe may be caused by any medication additions or changes?  No   Does the patient have all medications ordered at discharge?  Yes   Is the patient taking all medications as directed (includes completed medication regime)?  Yes   Does the patient have a primary care provider?   Yes   Does the patient have an appointment with their PCP within 7 days of discharge?  Yes   Has the patient kept scheduled appointments due by today?  N/A   Has home health visited the patient within 72 hours of discharge?  N/A   What DME was ordered?  [facility] for home oxygen   Has all DME been delivered?  Yes   Psychosocial issues?  No   Did the patient receive a copy of their discharge instructions?  Yes   Nursing interventions  Reviewed instructions with patient   What is the patient's perception of their health status since discharge?  Improving   Is the patient/caregiver able to teach back signs and symptoms related to disease process for when to call PCP?  Yes   Is the patient/caregiver able to teach back signs and symptoms related to disease process for when to call 911?  Yes   Is the patient/caregiver able to teach back the hierarchy of who to call/visit for symptoms/problems? PCP, Specialist, Home health nurse, Urgent Care, ED, 911  Yes   Additional teach back comments  States he is doing well.  He is getting a pulse ox to montior oxygen levels today.    TCM call completed?  Yes   Wrap up additional comments  Denies questions or needs at this time          Jeanna Delaney LPN    3/17/2021, 10:54 EDT

## 2021-03-17 NOTE — OUTREACH NOTE
Prep Survey      Responses   [facility] patient discharged from?  [place]   Is LACE score < 7 ?  No   Emergency Room discharge w/ pulse ox?  No   Eligibility  [facility]   Date of Admission  03/13/21   Date of Discharge  03/16/21   Discharge Disposition  Home or Self Care   Discharge diagnosis  Pulmonary embolism without acute cor pulmonale    Does the patient have one of the following disease processes/diagnoses(primary or secondary)?  Other   Does the patient have Home health ordered?  No   Is there a DME ordered?  Yes   What DME was ordered?  [facility] for home oxygen   Prep survey completed?  Yes          Ute Bales RN

## 2021-03-22 ENCOUNTER — OFFICE VISIT (OUTPATIENT)
Dept: FAMILY MEDICINE CLINIC | Facility: CLINIC | Age: 76
End: 2021-03-22

## 2021-03-22 VITALS
RESPIRATION RATE: 16 BRPM | HEIGHT: 68 IN | DIASTOLIC BLOOD PRESSURE: 75 MMHG | WEIGHT: 222 LBS | HEART RATE: 53 BPM | OXYGEN SATURATION: 98 % | SYSTOLIC BLOOD PRESSURE: 121 MMHG | BODY MASS INDEX: 33.65 KG/M2 | TEMPERATURE: 97.3 F

## 2021-03-22 DIAGNOSIS — E11.65 TYPE 2 DIABETES MELLITUS WITH HYPERGLYCEMIA, WITH LONG-TERM CURRENT USE OF INSULIN (HCC): ICD-10-CM

## 2021-03-22 DIAGNOSIS — I82.431 ACUTE DEEP VEIN THROMBOSIS (DVT) OF POPLITEAL VEIN OF RIGHT LOWER EXTREMITY (HCC): ICD-10-CM

## 2021-03-22 DIAGNOSIS — Z79.4 TYPE 2 DIABETES MELLITUS WITH HYPERGLYCEMIA, WITH LONG-TERM CURRENT USE OF INSULIN (HCC): ICD-10-CM

## 2021-03-22 DIAGNOSIS — I10 ESSENTIAL HYPERTENSION: ICD-10-CM

## 2021-03-22 DIAGNOSIS — E66.09 CLASS 1 OBESITY DUE TO EXCESS CALORIES WITH SERIOUS COMORBIDITY AND BODY MASS INDEX (BMI) OF 33.0 TO 33.9 IN ADULT: ICD-10-CM

## 2021-03-22 DIAGNOSIS — R09.02 HYPOXIA: Primary | ICD-10-CM

## 2021-03-22 DIAGNOSIS — I26.99 ACUTE PULMONARY EMBOLISM WITHOUT ACUTE COR PULMONALE, UNSPECIFIED PULMONARY EMBOLISM TYPE (HCC): ICD-10-CM

## 2021-03-22 PROCEDURE — 1111F DSCHRG MED/CURRENT MED MERGE: CPT | Performed by: FAMILY MEDICINE

## 2021-03-22 PROCEDURE — 99496 TRANSJ CARE MGMT HIGH F2F 7D: CPT | Performed by: FAMILY MEDICINE

## 2021-03-22 NOTE — PROGRESS NOTES
Transitional Care Follow Up Visit  Subjective   Cc: PE  Cordell Carreon is a 75 y.o. male who presents for a transitional care management visit.    Within 48 business hours after discharge our office contacted him via telephone to coordinate his care and needs.      I reviewed and discussed the details of that call along with the discharge summary, hospital problems, inpatient lab results, inpatient diagnostic studies, and consultation reports with Cordell JAMIL.     Current outpatient and discharge medications have been reconciled for the patient.  Reviewed by: Pallavi Mtz MD      Date of TCM Phone Call 3/16/2021   [facility]   Date of Admission 3/13/2021   Date of Discharge 3/16/2021   Discharge Disposition Home or Self Care     Risk for Readmission (LACE) Score: 7 (3/16/2021  5:01 AM)      History of Present Illness   Course During Hospital Stay:  Copied from IN note:     Hospital Course  Patient is a 75 y.o. male presented to [facility] emergency room 3/13/2021 with complaints of sudden onset of shortness of breath that started around breakfast.  He reported chest tightness and shortness of breath that lasted for 2 hours.  Patient denied radiation of chest pain, nausea, vomiting or abdominal pain.  No palpitations or diaphoresis.  Chronic mild lower edema in feet but denied recent weight gain or lower extremity edema.  No recent sick contacts or travel.  Patient denied any previous history of blood clots and no family history of blood clots.  He reported having the flu or cold 1 month ago and was tested negative for Covid.  Patient has had 4 - Covid testing.  He reports up until Covid restrictions he was very active. PO2 89 on room air he was placed on nasal cannula. D-dimer greater than 20, glucose 274, creatinine 1.27, troponin negative, proBNP 199.  Chest x-ray reported mild central vascular congestion with no evidence of overt CHF.  No consolidating infiltrates.  CT scan  of the chest reported extensive acute pulmonary emboli involving the right and left main pulmonary arteries, first, second and third order pulmonary artery branches bilaterally, with evidence of right heart strain.  No saddle embolus identified.  Dr. Luque, ER physician discussed case with Dr. Blank who noted patient did not need immediate intervention.  Lovenox 1 mg/kg given in the emergency room.     He was admitted to the coronary care unit with acute bilateral pulmonary emboli and hypoxia secondary to acute bilateral pulmonary emboli.  Lovenox 1 mg/kg every 12 hours continued.  Patient remained on oxygen requiring 4 L.  Echocardiogram 3/14 reported ejection fraction greater than 70%. RVSP pressure moderately elevated 45-55.  Right ventricular cavity is mild to moderately dilated but appears to have normal systolic function.  No Sorto sign (if present specific for acute RV strain from PE).  No valvular pathology.  Venous Dopplers bilateral lower extremities 3/14 noted thrombus visualized in the right popliteal vein.  Patient did require oxygen up to 5 L to keep saturation greater than 90 to 92%.  Repeat troponins 0.031.  Patient denied any chest pain.  Metformin held on admission and he was placed on sliding scale insulin coverage.       He was transferred out of the critical care unit on 3/14.  Patient was requiring oxygen at 4 L and was able to wean to 2 L.  Lovenox 1 mg/kg every 12 hours transitioned to Eliquis 10 mg twice daily for 7 days beginning 3/15/2021, then Eliquis 5 mg twice daily beginning 3/22.  Eliquis starter pack given at discharge.     Patient required oxygen at 5 L temporarily and was weaned to 2 L.  Walking oximetry 3/16 O2 saturation 88% on room air prior to ambulation.  87% with activity.  Oxygen 2 L continuously ordered at discharge.  Patient denied shortness of breath reported he felt much better breathing than upon admission.     Hemoglobin A1c 8.7.  Metformin held on admission.   "Placed on sliding scale insulin.  Basaglar resumed.     Confirmed blood pressure medications and patient does take both amlodipine/benazepril and additional dose of benazepril 5 mg per primary care provider.  Blood pressure 138/76 at discharge.     On 3/16/2021 he is stable for discharge.  He has been weaned oxygen 2 L and will require oxygen at 2 L continuously for period time after discharge.  He continues to ambulate and reports that shortness of breath has significantly improved.  He denies leg pain.  Denies chest pain, palpitations.  Denies nausea, vomiting or abdominal pain.  He has been structured on Eliquis.  Starter pack given at discharge.  We will continue aspirin as patient has history of coronary artery disease with coronary artery bypass grafting.  Follow-up with Dr. Mtz 3/22/2021.    ======================  Since DC he has been doing well - no complaints - has been saulo to get medication and is taking it appropriately.      The following portions of the patient's history were reviewed and updated as appropriate: allergies, current medications, past family history, past medical history, past social history, past surgical history and problem list.    Review of Systems   Constitutional: Positive for activity change.   Respiratory: Positive for shortness of breath. Negative for cough.    Hematological: Bruises/bleeds easily.   All other systems reviewed and are negative.      Objective    /75 (BP Location: Left arm, Patient Position: Sitting, Cuff Size: Adult)   Pulse 53   Temp 97.3 °F (36.3 °C) (Infrared)   Resp 16   Ht 172.7 cm (68\") Comment: Per patient  Wt 101 kg (222 lb)   SpO2 98% Comment: on 2L per NC  BMI 33.75 kg/m²     Physical Exam  Vitals and nursing note reviewed.   Constitutional:       General: He is not in acute distress.     Appearance: He is well-developed. He is obese. He is not diaphoretic.   HENT:      Head: Normocephalic and atraumatic.      Right Ear: External ear " normal.      Left Ear: External ear normal.      Nose: Nose normal.   Eyes:      General:         Right eye: No discharge.         Left eye: No discharge.      Conjunctiva/sclera: Conjunctivae normal.   Neck:      Thyroid: No thyromegaly.      Trachea: No tracheal deviation.   Cardiovascular:      Rate and Rhythm: Normal rate and regular rhythm.      Pulses: Normal pulses.   Pulmonary:      Effort: Pulmonary effort is normal. No respiratory distress.      Breath sounds: Normal breath sounds. No stridor. No wheezing.   Chest:      Chest wall: No tenderness.   Abdominal:      General: Bowel sounds are normal. There is no distension.      Palpations: Abdomen is soft.      Tenderness: There is no abdominal tenderness.   Musculoskeletal:         General: Normal range of motion.      Cervical back: Normal range of motion.   Lymphadenopathy:      Cervical: No cervical adenopathy.   Skin:     General: Skin is warm and dry.   Neurological:      Mental Status: He is alert and oriented to person, place, and time.      Motor: No abnormal muscle tone.      Coordination: Coordination normal.   Psychiatric:         Behavior: Behavior normal.         Thought Content: Thought content normal.         Judgment: Judgment normal.         Assessment/Plan   Problems Addressed this Visit        Coag and Thromboembolic    Pulmonary embolism without acute cor pulmonale (CMS/HCC)    Deep vein thrombosis (DVT) of popliteal vein of right lower extremity (CMS/HCC)       Endocrine and Metabolic    Type 2 diabetes mellitus with hyperglycemia, with long-term current use of insulin (CMS/HCC)       Pulmonary and Pneumonias    Hypoxia secondary to acute bilateral pulmonary emboli - Primary      Other Visit Diagnoses     Essential hypertension        Class 1 obesity due to excess calories with serious comorbidity and body mass index (BMI) of 33.0 to 33.9 in adult          Diagnoses       Codes Comments    Hypoxia secondary to acute bilateral pulmonary  emboli    -  Primary ICD-10-CM: R09.02  ICD-9-CM: 799.02     Type 2 diabetes mellitus with hyperglycemia, with long-term current use of insulin (CMS/[facility])     ICD-10-CM: E11.65, Z79.4  ICD-9-CM: 250.00, 790.29, V58.67     Acute pulmonary embolism without acute cor pulmonale, unspecified pulmonary embolism type (CMS/HCC)     ICD-10-CM: I26.99  ICD-9-CM: 415.19     Essential hypertension     ICD-10-CM: I10  ICD-9-CM: 401.9     Acute deep vein thrombosis (DVT) of popliteal vein of right lower extremity (CMS/HCC)     ICD-10-CM: I82.431  ICD-9-CM: 453.41     Class 1 obesity due to excess calories with serious comorbidity and body mass index (BMI) of 33.0 to 33.9 in adult     ICD-10-CM: E66.09, Z68.33  ICD-9-CM: 278.00, V85.33         PLAN:     #1 acute respiratory failure 2/2 PE and DVT of right lower ext: pt stable on continuous oxygen, currently on anticoag, he is breathing well, no problems with meidcation, advised on importance of medication compliance, keep follow up as scheduled, as he begins improving will plan to taper supplemental oxygen     #2 HTN: chronic, controlled, continue on current medication     #3 DM: chronic, uncontrolled with hyperglycemia, reviewed medications, advised on importance of glucose control     #4 obesity: Patient's Body mass index is 33.75 kg/m². BMI is above normal parameters. Recommendations include: educational material, exercise counseling and nutrition counseling.          This document has been electronically signed by Pallavi Mtz MD on March 31, 2021 18:38 CDT

## 2021-03-24 ENCOUNTER — READMISSION MANAGEMENT (OUTPATIENT)
Dept: CALL CENTER | Facility: HOSPITAL | Age: 76
End: 2021-03-24

## 2021-03-24 NOTE — OUTREACH NOTE
Medical Week 2 Survey      Responses   [facility] patient discharged from?  [place]   Does the patient have one of the following disease processes/diagnoses(primary or secondary)?  Other   Week 2 attempt successful?  Yes   Call start time  1419   Discharge diagnosis  Pulmonary embolism without acute cor pulmonale    Call end time  1421   Is patient permission given to speak with other caregiver?  Yes   List who call center can speak with  Wife, Nelli   Meds reviewed with patient/caregiver?  Yes   Is the patient taking all medications as directed (includes completed medication regime)?  Yes   Has the patient kept scheduled appointments due by today?  Yes   What DME was ordered?  [facility] for home oxygen   DME comments  O2 @ 2 liters and pulse ox 95 %   What is the patient's perception of their health status since discharge?  Improving   If the patient is a current smoker, are they able to teach back resources for cessation?  Not a smoker   Week 2 Call Completed?  Yes   Wrap up additional comments  Bp 121/74. Feeling better. No questions.           Stephanie Rodriguez RN

## 2021-04-01 ENCOUNTER — READMISSION MANAGEMENT (OUTPATIENT)
Dept: CALL CENTER | Facility: HOSPITAL | Age: 76
End: 2021-04-01

## 2021-04-01 NOTE — OUTREACH NOTE
Medical Week 3 Survey      Responses   [facility] patient discharged from?  [place]   Does the patient have one of the following disease processes/diagnoses(primary or secondary)?  Other   Week 3 attempt successful?  Yes   Call start time  1505   Call end time  1508   Meds reviewed with patient/caregiver?  Yes   Is the patient having any side effects they believe may be caused by any medication additions or changes?  No   Does the patient have all medications ordered at discharge?  Yes   Is the patient taking all medications as directed (includes completed medication regime)?  Yes   Does the patient have a primary care provider?   Yes   Does the patient have an appointment with their PCP within 7 days of discharge?  Yes   Has the patient kept scheduled appointments due by today?  Yes   Has all DME been delivered?  Yes   DME comments  O2 @ 2 liters and pulse ox 95 %   Psychosocial issues?  No   What is the patient's perception of their health status since discharge?  Improving   Is the patient/caregiver able to teach back signs and symptoms related to disease process for when to call PCP?  Yes   Is the patient/caregiver able to teach back signs and symptoms related to disease process for when to call 911?  Yes   Is the patient/caregiver able to teach back the hierarchy of who to call/visit for symptoms/problems? PCP, Specialist, Home health nurse, Urgent Care, ED, 911  Yes   If the patient is a current smoker, are they able to teach back resources for cessation?  Not a smoker   Week 3 Call Completed?  Yes   Graduated  Yes   Is the patient interested in additional calls from an ambulatory ?  NOTE:  applies to high risk patients requiring additional follow-up.  No   Did the patient feel the follow up calls were helpful during their recovery period?  Yes   Was the number of calls appropriate?  Yes   Does the patient have an Advance Directive or Living Will?  Yes   Is the patient/caregiver familiar with  Advance Care Planning?  No   Would the patient like more information on Advance Care Planning?  Yes   Graduated/Revoked comments  Pt states he is better, went out to get 2nd Covid shot w/o O2 and did great. Does not want any additional calls.   Wrap up additional comments  Pt states he is better,  doesn't feel like he needs any additional calls.          Nina Guy RN

## 2021-04-08 DIAGNOSIS — E11.9 TYPE 2 DIABETES MELLITUS WITH HEMOGLOBIN A1C GOAL OF LESS THAN 7.0% (HCC): ICD-10-CM

## 2021-04-08 DIAGNOSIS — I10 ESSENTIAL HYPERTENSION: ICD-10-CM

## 2021-04-08 RX ORDER — BENAZEPRIL HYDROCHLORIDE 5 MG/1
TABLET, FILM COATED ORAL
Qty: 90 TABLET | Refills: 0 | OUTPATIENT
Start: 2021-04-08

## 2021-04-08 RX ORDER — GLIMEPIRIDE 2 MG/1
TABLET ORAL
Qty: 90 TABLET | Refills: 0 | Status: SHIPPED | OUTPATIENT
Start: 2021-04-08 | End: 2021-05-03 | Stop reason: SDUPTHER

## 2021-04-08 RX ORDER — APIXABAN 5 MG (74)
5 KIT ORAL TAKE AS DIRECTED
Qty: 74 TABLET | Refills: 0 | OUTPATIENT
Start: 2021-04-08

## 2021-04-08 NOTE — TELEPHONE ENCOUNTER
Caller: Cordell Carreon    Relationship: Self    Best call back number: [phone]    Medication needed:   Requested Prescriptions     Pending Prescriptions Disp Refills   • benazepril (LOTENSIN) 5 MG tablet [Pharmacy Med Name: BENAZEPRIL HCL 5 MG TABLET] 90 tablet 0     Sig: TAKE 1 TABLET BY MOUTH EVERY DAY   • Apixaban Starter Pack (Eliquis DVT/PE Starter Pack) tablet therapy pack 74 tablet 0     Sig: Take two 5 mg tablets by mouth every 12 hours for 7 days. Followed by one 5 mg tablet every 12 hours. (Dispense starter pack if available)       When do you need the refill by: 04/12/21    Does the patient have less than a 3 day supply:  [x] Yes  [] No    What is the patient's preferred pharmacy: [id number] - [place], [place] - [address] [place] [address] [phone] [facility] [phone]

## 2021-05-03 ENCOUNTER — OFFICE VISIT (OUTPATIENT)
Dept: FAMILY MEDICINE CLINIC | Facility: CLINIC | Age: 76
End: 2021-05-03

## 2021-05-03 VITALS
HEIGHT: 68 IN | RESPIRATION RATE: 16 BRPM | SYSTOLIC BLOOD PRESSURE: 122 MMHG | TEMPERATURE: 97.7 F | HEART RATE: 50 BPM | WEIGHT: 226.8 LBS | DIASTOLIC BLOOD PRESSURE: 74 MMHG | BODY MASS INDEX: 34.37 KG/M2 | OXYGEN SATURATION: 98 %

## 2021-05-03 DIAGNOSIS — Z95.1 H/O HEART BYPASS SURGERY: ICD-10-CM

## 2021-05-03 DIAGNOSIS — E11.9 TYPE 2 DIABETES MELLITUS WITH HEMOGLOBIN A1C GOAL OF LESS THAN 7.0% (HCC): ICD-10-CM

## 2021-05-03 DIAGNOSIS — E66.09 CLASS 1 OBESITY DUE TO EXCESS CALORIES WITH SERIOUS COMORBIDITY AND BODY MASS INDEX (BMI) OF 34.0 TO 34.9 IN ADULT: Primary | ICD-10-CM

## 2021-05-03 DIAGNOSIS — I10 ESSENTIAL HYPERTENSION: ICD-10-CM

## 2021-05-03 PROCEDURE — 99214 OFFICE O/P EST MOD 30 MIN: CPT | Performed by: FAMILY MEDICINE

## 2021-05-03 RX ORDER — GLIMEPIRIDE 2 MG/1
2 TABLET ORAL
Qty: 90 TABLET | Refills: 0 | Status: SHIPPED | OUTPATIENT
Start: 2021-05-03 | End: 2021-09-21 | Stop reason: SDUPTHER

## 2021-05-03 RX ORDER — BENAZEPRIL HYDROCHLORIDE 5 MG/1
5 TABLET, FILM COATED ORAL DAILY
Qty: 90 TABLET | Refills: 0 | Status: SHIPPED | OUTPATIENT
Start: 2021-05-03 | End: 2021-09-09

## 2021-05-17 ENCOUNTER — TELEPHONE (OUTPATIENT)
Dept: FAMILY MEDICINE CLINIC | Facility: CLINIC | Age: 76
End: 2021-05-17

## 2021-05-17 NOTE — TELEPHONE ENCOUNTER
Patient called in stating that he was recently hospitalized. Patient seen in office on 05/03/2021. Patient was discharged on oxygen. Patient reports that he hasn't used oxygen therapy in 3-4weeks. Patient is asking if oxygen can be discontinued so that [facility] can  tanks.

## 2021-05-20 NOTE — PROGRESS NOTES
Subjective cc: DM   Cordell Carreon is a 75 y.o. male who presents for follow up on DM.     HTN: chronic, controlled   DM: chronic, uncontrolled   Obesity: trending up       Hypertension  This is a chronic problem. The current episode started more than 1 year ago. The problem is unchanged. The problem is controlled. Pertinent negatives include no anxiety, blurred vision, chest pain, headaches, neck pain, orthopnea, palpitations, peripheral edema, shortness of breath or sweats. Risk factors for coronary artery disease include diabetes mellitus, dyslipidemia, obesity, male gender and sedentary lifestyle. Current antihypertension treatment includes calcium channel blockers, ACE inhibitors and beta blockers. The current treatment provides moderate improvement. Compliance problems include diet and exercise.  Hypertensive end-organ damage includes CAD/MI. There is no history of CVA, PVD or retinopathy. Identifiable causes of hypertension include a hypertension causing med.   Diabetes  He presents for his follow-up diabetic visit. He has type 2 diabetes mellitus. No MedicAlert identification noted. The initial diagnosis of diabetes was made 8 years ago. His disease course has been stable. There are no hypoglycemic associated symptoms. Pertinent negatives for hypoglycemia include no confusion, dizziness, headaches, hunger, mood changes, nervousness/anxiousness, pallor, seizures, sleepiness, speech difficulty, sweats or tremors. Pertinent negatives for diabetes include no blurred vision, no chest pain, no fatigue, no foot paresthesias, no foot ulcerations, no polydipsia, no polyphagia, no polyuria, no visual change, no weakness and no weight loss. There are no hypoglycemic complications. Pertinent negatives for hypoglycemia complications include no blackouts, no hospitalization, no nocturnal hypoglycemia, no required assistance and no required glucagon injection. Symptoms are stable. Diabetic complications include heart  disease and nephropathy. Pertinent negatives for diabetic complications include no CVA, impotence, peripheral neuropathy, PVD or retinopathy. Risk factors for coronary artery disease include diabetes mellitus, dyslipidemia, obesity, male sex, hypertension and sedentary lifestyle. Current diabetic treatment includes oral agent (dual therapy) and insulin injections (metformin and glipizide, basglar). He is compliant with treatment all of the time. He is currently taking insulin at bedtime. Insulin injections are given by patient. Rotation sites for injection include the abdominal wall and thighs. His weight is increasing steadily. He is following a generally unhealthy diet. When asked about meal planning, he reported none. He has not had a previous visit with a dietitian. He participates in exercise three times a week. He monitors blood glucose at home 1-2 x per week. He monitors urine at home <1 x per month. Blood glucose monitoring compliance is good. His home blood glucose trend is decreasing steadily. His breakfast blood glucose is taken between 9-10 am. His breakfast blood glucose range is generally >200 mg/dl. His overall blood glucose range is 180-200 mg/dl. An ACE inhibitor/angiotensin II receptor blocker is being taken. He does not see a podiatrist.Eye exam is current (July ).   Coronary Artery Disease  Presents for follow-up visit. Pertinent negatives include no chest pain, chest pressure, chest tightness, dizziness, leg swelling, muscle weakness, palpitations, shortness of breath or weight gain. The symptoms have been stable. Compliance with diet is poor. Compliance with exercise is poor. Compliance with medications is good.        The following portions of the patient's history were reviewed and updated as appropriate: allergies, current medications, past family history, past medical history, past social history, past surgical history and problem list.        Review of Systems   Constitutional: Negative  "for activity change, appetite change, chills, fatigue, fever, weight gain and weight loss.   Eyes: Negative for blurred vision.   Respiratory: Negative for cough, chest tightness and shortness of breath.    Cardiovascular: Negative for chest pain, palpitations, orthopnea and leg swelling.   Gastrointestinal: Negative for abdominal pain, constipation, diarrhea, nausea and vomiting.   Endocrine: Negative for polydipsia, polyphagia and polyuria.   Genitourinary: Negative for impotence.   Musculoskeletal: Negative for muscle weakness and neck pain.   Skin: Negative for pallor.   Neurological: Negative for dizziness, tremors, seizures, speech difficulty, weakness and headaches.   Psychiatric/Behavioral: Negative for confusion. The patient is not nervous/anxious.    All other systems reviewed and are negative.      Objective   Blood pressure 122/74, pulse 50, temperature 97.7 °F (36.5 °C), temperature source Infrared, resp. rate 16, height 172.7 cm (68\"), weight 103 kg (226 lb 12.8 oz), SpO2 98 %.  Physical Exam   Constitutional: He is oriented to person, place, and time. He appears well-developed. No distress.   HENT:   Head: Normocephalic and atraumatic.   Right Ear: External ear normal.   Left Ear: External ear normal.   Nose: Nose normal.   Eyes: Conjunctivae are normal. Right eye exhibits no discharge. Left eye exhibits no discharge.   Neck: No tracheal deviation present. No thyromegaly present.   Cardiovascular: Normal rate, regular rhythm, normal heart sounds and normal pulses.   Pulmonary/Chest: Effort normal and breath sounds normal. No stridor. No respiratory distress. He has no wheezes. He exhibits no tenderness.   Abdominal: Soft. He exhibits no distension. There is no abdominal tenderness.   Musculoskeletal: Normal range of motion.   Lymphadenopathy:     He has no cervical adenopathy.   Neurological: He is alert and oriented to person, place, and time. He exhibits normal muscle tone. Coordination normal.   "   Skin: Skin is warm and dry. He is not diaphoretic.   Psychiatric: His behavior is normal. Judgment and thought content normal.   Nursing note and vitals reviewed.      Assessment/Plan   Problems Addressed this Visit        Cardiac and Vasculature    H/O heart bypass surgery      Other Visit Diagnoses     Class 1 obesity due to excess calories with serious comorbidity and body mass index (BMI) of 34.0 to 34.9 in adult    -  Primary    Essential hypertension        Relevant Medications    benazepril (LOTENSIN) 5 MG tablet    Type 2 diabetes mellitus with hemoglobin A1c goal of less than 7.0% (CMS/[facility])        Relevant Medications    glimepiride (AMARYL) 2 MG tablet      Diagnoses       Codes Comments    Class 1 obesity due to excess calories with serious comorbidity and body mass index (BMI) of 34.0 to 34.9 in adult    -  Primary ICD-10-CM: E66.09, Z68.34  ICD-9-CM: 278.00, V85.34     Essential hypertension     ICD-10-CM: I10  ICD-9-CM: 401.9     Type 2 diabetes mellitus with hemoglobin A1c goal of less than 7.0% (CMS/[facility])     ICD-10-CM: E11.9  ICD-9-CM: 250.00     H/O heart bypass surgery     ICD-10-CM: Z95.1  ICD-9-CM: V45.81           PLAN:     #1 DM: chronic, uncontrolled with hyperglycemia, labs reviewed - ha1c today, discussed adjusting dose of insulin, continue on current medications, recheck in 3 months, eye exam UTD    #2 HTN: chronic, controlled, continue on current medication     #3 HLD/CAD: chronic, uncontrolled, on statin, advised on lifestyle changes     #4 obesity: Patient's Body mass index is 34.48 kg/m². BMI is above normal parameters. Recommendations include: educational material, exercise counseling and nutrition counseling.     Past information, including assessment and plan, reviewed and updated as appropriate           This document has been electronically signed by Pallavi Mtz MD on May 19, 2021 21:59 CDT

## 2021-06-10 DIAGNOSIS — I10 ESSENTIAL HYPERTENSION: ICD-10-CM

## 2021-06-10 RX ORDER — BENAZEPRIL HYDROCHLORIDE 5 MG/1
TABLET, FILM COATED ORAL
Qty: 90 TABLET | Refills: 0 | OUTPATIENT
Start: 2021-06-10

## 2021-06-17 ENCOUNTER — OFFICE VISIT (OUTPATIENT)
Dept: FAMILY MEDICINE CLINIC | Facility: CLINIC | Age: 76
End: 2021-06-17

## 2021-06-17 VITALS
OXYGEN SATURATION: 97 % | SYSTOLIC BLOOD PRESSURE: 122 MMHG | TEMPERATURE: 97.8 F | RESPIRATION RATE: 16 BRPM | WEIGHT: 226.8 LBS | BODY MASS INDEX: 34.37 KG/M2 | HEART RATE: 53 BPM | DIASTOLIC BLOOD PRESSURE: 68 MMHG | HEIGHT: 68 IN

## 2021-06-17 DIAGNOSIS — E66.09 CLASS 1 OBESITY DUE TO EXCESS CALORIES WITH SERIOUS COMORBIDITY AND BODY MASS INDEX (BMI) OF 34.0 TO 34.9 IN ADULT: ICD-10-CM

## 2021-06-17 DIAGNOSIS — E78.2 MIXED HYPERLIPIDEMIA: ICD-10-CM

## 2021-06-17 DIAGNOSIS — I10 ESSENTIAL HYPERTENSION: ICD-10-CM

## 2021-06-17 DIAGNOSIS — E11.9 TYPE 2 DIABETES MELLITUS WITH HEMOGLOBIN A1C GOAL OF LESS THAN 7.0% (HCC): Primary | ICD-10-CM

## 2021-06-17 LAB — HBA1C MFR BLD: 9.1 %

## 2021-06-17 PROCEDURE — 83036 HEMOGLOBIN GLYCOSYLATED A1C: CPT | Performed by: FAMILY MEDICINE

## 2021-06-17 PROCEDURE — 99214 OFFICE O/P EST MOD 30 MIN: CPT | Performed by: FAMILY MEDICINE

## 2021-06-18 LAB
ALBUMIN/CREAT UR: 22 MG/G CREAT (ref 0–29)
CREAT UR-MCNC: 144.6 MG/DL
MICROALBUMIN UR-MCNC: 31.7 UG/ML

## 2021-06-30 NOTE — PROGRESS NOTES
Subjective cc: DM   Cordell Carreon is a 75 y.o. male with HTN, HLD, DM, and obesity who presents for follow up on DM.    Will check ha1c today   HTN is well controled   Obesity: weight continues to trend up     Diabetes  He presents for his follow-up diabetic visit. He has type 2 diabetes mellitus. There are no hypoglycemic associated symptoms. Associated symptoms include fatigue and foot paresthesias. There are no hypoglycemic complications. Symptoms are stable. Risk factors for coronary artery disease include diabetes mellitus, dyslipidemia, male sex, obesity, hypertension and sedentary lifestyle. Current diabetic treatment includes insulin injections and oral agent (dual therapy). He is compliant with treatment all of the time. His weight is increasing steadily. He is following a generally unhealthy diet. Meal planning includes avoidance of concentrated sweets. An ACE inhibitor/angiotensin II receptor blocker is being taken.   Hypertension  This is a chronic problem. The current episode started more than 1 year ago. The problem has been gradually improving since onset. The problem is controlled. Risk factors for coronary artery disease include diabetes mellitus, dyslipidemia, obesity and male gender. Current antihypertension treatment includes calcium channel blockers, ACE inhibitors and beta blockers. The current treatment provides significant improvement. Compliance problems include exercise and diet.  Hypertensive end-organ damage includes CAD/MI.   Hyperlipidemia  This is a chronic problem. The current episode started more than 1 year ago. Recent lipid tests were reviewed and are high. Exacerbating diseases include diabetes and obesity. Factors aggravating his hyperlipidemia include beta blockers and fatty foods. Current antihyperlipidemic treatment includes statins and diet change. The current treatment provides mild improvement of lipids. Compliance problems include adherence to diet and adherence to  "exercise.  Risk factors for coronary artery disease include diabetes mellitus, dyslipidemia, hypertension, male sex and obesity.   Obesity  This is a chronic problem. The current episode started more than 1 year ago. The problem occurs constantly. The problem has been gradually worsening. Associated symptoms include fatigue. The symptoms are aggravated by eating. He has tried nothing for the symptoms. The treatment provided no relief.        The following portions of the patient's history were reviewed and updated as appropriate: allergies, current medications, past family history, past medical history, past social history, past surgical history and problem list.        Review of Systems   Constitutional: Positive for fatigue and unexpected weight change. Negative for activity change.   All other systems reviewed and are negative.      Objective   Blood pressure 122/68, pulse 53, temperature 97.8 °F (36.6 °C), temperature source Infrared, resp. rate 16, height 172.7 cm (68\"), weight 103 kg (226 lb 12.8 oz), SpO2 97 %.  Physical Exam  Vitals and nursing note reviewed.   Constitutional:       General: He is not in acute distress.     Appearance: He is well-developed. He is obese. He is not diaphoretic.   HENT:      Head: Normocephalic and atraumatic.      Right Ear: External ear normal.      Left Ear: External ear normal.      Nose: Nose normal.   Eyes:      General:         Right eye: No discharge.         Left eye: No discharge.      Conjunctiva/sclera: Conjunctivae normal.   Neck:      Thyroid: No thyromegaly.      Trachea: No tracheal deviation.   Cardiovascular:      Rate and Rhythm: Normal rate and regular rhythm.      Pulses: Normal pulses.   Pulmonary:      Effort: Pulmonary effort is normal. No respiratory distress.      Breath sounds: Normal breath sounds. No stridor. No wheezing.   Chest:      Chest wall: No tenderness.   Abdominal:      General: There is no distension.      Palpations: Abdomen is soft.     "  Tenderness: There is no abdominal tenderness.   Musculoskeletal:      Cervical back: Normal range of motion.   Lymphadenopathy:      Cervical: No cervical adenopathy.   Skin:     General: Skin is warm and dry.   Neurological:      Mental Status: He is alert and oriented to person, place, and time.      Motor: No abnormal muscle tone.      Coordination: Coordination normal.   Psychiatric:         Behavior: Behavior normal.         Thought Content: Thought content normal.         Judgment: Judgment normal.       Lab Results (most recent)     Procedure Component Value Units Date/Time    Microalbumin / Creatinine Urine Ratio - Urine, Clean Catch [[id number]] Collected: 06/17/21 1037    Specimen: Urine, Clean Catch Updated: 06/18/21 1011     Creatinine, Urine 144.6 mg/dL      Microalbumin, Urine 31.7 ug/mL      Microalbumin/Creatinine Ratio 22 mg/g creat      Comment:                        Normal:                0 -  29                         Moderately increased: 30 - 300                         Severely increased:       >300         Narrative:      Performed at:  [address] [place] [address]  [id number]  : Eduard Barrera PhD, Phone:  [phone]    POC Glycosylated Hemoglobin (Hb A1C) [[id number]]  (Abnormal) Collected: 06/17/21 1125    Specimen: Blood Updated: 06/17/21 1126     Hemoglobin A1C 9.1 %         Assessment/Plan   Problems Addressed this Visit        Cardiac and Vasculature    Hyperlipidemia      Other Visit Diagnoses     Type 2 diabetes mellitus with hemoglobin A1c goal of less than 7.0% (CMS/[facility])    -  Primary    Relevant Orders    POC Glycosylated Hemoglobin (Hb A1C) (Completed)    Microalbumin / Creatinine Urine Ratio - Urine, Clean Catch (Completed)    Class 1 obesity due to excess calories with serious comorbidity and body mass index (BMI) of 34.0 to 34.9 in adult        Essential hypertension          Diagnoses       Codes Comments    Type 2 diabetes mellitus with  hemoglobin A1c goal of less than 7.0% (CMS/[facility])    -  Primary ICD-10-CM: E11.9  ICD-9-CM: 250.00     Mixed hyperlipidemia     ICD-10-CM: E78.2  ICD-9-CM: 272.2     Class 1 obesity due to excess calories with serious comorbidity and body mass index (BMI) of 34.0 to 34.9 in adult     ICD-10-CM: E66.09, Z68.34  ICD-9-CM: 278.00, V85.34     Essential hypertension     ICD-10-CM: I10  ICD-9-CM: 401.9         PLAN:     #1 HTN: chronic, controlled, continue on current medication    #2 DM: chronic, uncontrolled, reviewed Ha1c, advised on meidcation changes     #3 HLD: chronic, uncontrolled, continue on statin     #4 obesity: Patient's Body mass index is 34.48 kg/m². indicating that he is obese (BMI >30). Obesity-related health conditions include the following: hypertension, diabetes mellitus and dyslipidemias. Obesity is worsening. BMI is is above average; BMI management plan is completed. We discussed portion control and increasing exercise.          This document has been electronically signed by Pallavi Mtz MD on June 30, 2021 19:15 CDT    .

## 2021-07-06 RX ORDER — APIXABAN 5 MG/1
TABLET, FILM COATED ORAL
Qty: 60 TABLET | Refills: 2 | Status: SHIPPED | OUTPATIENT
Start: 2021-07-06 | End: 2021-09-21 | Stop reason: SDUPTHER

## 2021-08-25 ENCOUNTER — TELEPHONE (OUTPATIENT)
Dept: FAMILY MEDICINE CLINIC | Facility: CLINIC | Age: 76
End: 2021-08-25

## 2021-08-25 DIAGNOSIS — E11.65 TYPE 2 DIABETES MELLITUS WITH HYPERGLYCEMIA, WITHOUT LONG-TERM CURRENT USE OF INSULIN (HCC): ICD-10-CM

## 2021-08-25 RX ORDER — INSULIN GLARGINE 100 [IU]/ML
30 INJECTION, SOLUTION SUBCUTANEOUS NIGHTLY
Qty: 10 PEN | Refills: 3 | Status: SHIPPED | OUTPATIENT
Start: 2021-08-25 | End: 2022-05-03 | Stop reason: SDUPTHER

## 2021-08-25 NOTE — TELEPHONE ENCOUNTER
Patient is reporting that the Basaglar rx cant be broken they would need a new rx with a 3 month     I have called the pharmacy they advised for a 90 day supply patient would need 2 boxes which is 10 pens or 30ml.

## 2021-08-25 NOTE — TELEPHONE ENCOUNTER
PATIENT CALLED AND REQUESTED TO HAVE PCP OR NURSE CONTACT PHARMACY BECAUSE HE IS UNABLE TO GET THIS RX FILLED DUE TO PHARMACY NOT BEING ABLE TO SPLIT.

## 2021-09-08 DIAGNOSIS — I10 ESSENTIAL HYPERTENSION: ICD-10-CM

## 2021-09-09 RX ORDER — BENAZEPRIL HYDROCHLORIDE 5 MG/1
TABLET, FILM COATED ORAL
Qty: 90 TABLET | Refills: 0 | Status: SHIPPED | OUTPATIENT
Start: 2021-09-09 | End: 2021-11-22

## 2021-09-21 ENCOUNTER — OFFICE VISIT (OUTPATIENT)
Dept: FAMILY MEDICINE CLINIC | Facility: CLINIC | Age: 76
End: 2021-09-21

## 2021-09-21 VITALS
RESPIRATION RATE: 16 BRPM | HEART RATE: 58 BPM | OXYGEN SATURATION: 98 % | DIASTOLIC BLOOD PRESSURE: 70 MMHG | BODY MASS INDEX: 34.77 KG/M2 | WEIGHT: 229.4 LBS | SYSTOLIC BLOOD PRESSURE: 128 MMHG | HEIGHT: 68 IN | TEMPERATURE: 97.4 F

## 2021-09-21 DIAGNOSIS — I10 ESSENTIAL HYPERTENSION: Primary | ICD-10-CM

## 2021-09-21 DIAGNOSIS — E11.65 TYPE 2 DIABETES MELLITUS WITH HYPERGLYCEMIA, WITHOUT LONG-TERM CURRENT USE OF INSULIN (HCC): ICD-10-CM

## 2021-09-21 DIAGNOSIS — I27.82 CHRONIC PULMONARY EMBOLISM WITHOUT ACUTE COR PULMONALE, UNSPECIFIED PULMONARY EMBOLISM TYPE (HCC): ICD-10-CM

## 2021-09-21 DIAGNOSIS — E66.09 CLASS 1 OBESITY DUE TO EXCESS CALORIES WITH SERIOUS COMORBIDITY AND BODY MASS INDEX (BMI) OF 34.0 TO 34.9 IN ADULT: ICD-10-CM

## 2021-09-21 DIAGNOSIS — E78.2 MIXED HYPERLIPIDEMIA: ICD-10-CM

## 2021-09-21 PROCEDURE — 99214 OFFICE O/P EST MOD 30 MIN: CPT | Performed by: FAMILY MEDICINE

## 2021-09-21 RX ORDER — AMLODIPINE BESYLATE AND BENAZEPRIL HYDROCHLORIDE 10; 20 MG/1; MG/1
1 CAPSULE ORAL DAILY
Qty: 90 CAPSULE | Refills: 3 | Status: SHIPPED | OUTPATIENT
Start: 2021-09-21 | End: 2022-08-08 | Stop reason: SDUPTHER

## 2021-09-21 RX ORDER — ATORVASTATIN CALCIUM 80 MG/1
80 TABLET, FILM COATED ORAL DAILY
Qty: 90 TABLET | Refills: 3 | Status: SHIPPED | OUTPATIENT
Start: 2021-09-21 | End: 2022-08-08 | Stop reason: SDUPTHER

## 2021-09-21 RX ORDER — METOPROLOL TARTRATE 50 MG/1
50 TABLET, FILM COATED ORAL EVERY 12 HOURS
Qty: 180 TABLET | Refills: 3 | Status: SHIPPED | OUTPATIENT
Start: 2021-09-21 | End: 2022-08-08 | Stop reason: SDUPTHER

## 2021-09-21 RX ORDER — GLIMEPIRIDE 2 MG/1
2 TABLET ORAL
Qty: 90 TABLET | Refills: 3 | Status: SHIPPED | OUTPATIENT
Start: 2021-09-21 | End: 2022-08-08 | Stop reason: SDUPTHER

## 2021-09-21 NOTE — PROGRESS NOTES
Subjective cc: DM   Cordell Carreon is a 76 y.o. male with DM, HTN, HLD, and obesity presents for follow up.    He did have a PE in March 2021 - discussed needed follow up today.  Reviewed ECHO. He is not having any symptoms - breathing is good, no problems with exertion, no CP.     Diabetes  He presents for his follow-up diabetic visit. He has type 2 diabetes mellitus. His disease course has been worsening. Pertinent negatives for hypoglycemia include no confusion, dizziness, headaches, hunger, mood changes, nervousness/anxiousness, pallor, seizures, sleepiness, speech difficulty, sweats or tremors. Pertinent negatives for diabetes include no foot ulcerations, no polyphagia, no polyuria, no visual change, no weakness and no weight loss. Pertinent negatives for hypoglycemia complications include no blackouts, no hospitalization, no nocturnal hypoglycemia, no required assistance and no required glucagon injection. Pertinent negatives for diabetic complications include no CVA, heart disease, impotence, nephropathy, peripheral neuropathy, PVD or retinopathy. Risk factors for coronary artery disease include dyslipidemia and obesity. Current diabetic treatment includes diet, insulin injections and oral agent (triple therapy). He is compliant with treatment all of the time. He is currently taking insulin at bedtime. Insulin injections are given by patient. Rotation sites for injection include the abdominal wall and thighs. His weight is stable. He is following a generally healthy diet. Meal planning includes avoidance of concentrated sweets. He has not had a previous visit with a dietitian. He participates in exercise daily. He monitors blood glucose at home 1-2 x per week. He monitors urine at home <1 x per month. Blood glucose monitoring compliance is excellent. His home blood glucose trend is fluctuating minimally. His highest blood glucose is 180-200 mg/dl. His overall blood glucose range is 180-200 mg/dl. He does  not see a podiatrist.Eye exam is current.   Hypertension  This is a chronic problem. The current episode started more than 1 year ago. The problem is unchanged. The problem is controlled. Pertinent negatives include no headaches or sweats. Risk factors for coronary artery disease include diabetes mellitus, dyslipidemia, male gender and obesity. Current antihypertension treatment includes calcium channel blockers, ACE inhibitors and beta blockers. The current treatment provides significant improvement. Compliance problems include exercise and diet.  There is no history of CVA, PVD or retinopathy.   Hyperlipidemia  This is a chronic problem. The current episode started more than 1 year ago. The problem is uncontrolled. Recent lipid tests were reviewed and are high. Exacerbating diseases include diabetes and obesity. Factors aggravating his hyperlipidemia include fatty foods and beta blockers. Current antihyperlipidemic treatment includes statins. The current treatment provides no improvement of lipids. Compliance problems include adherence to diet and adherence to exercise.  Risk factors for coronary artery disease include diabetes mellitus, dyslipidemia, male sex, obesity and hypertension.        The following portions of the patient's history were reviewed and updated as appropriate: allergies, current medications, past family history, past medical history, past social history, past surgical history and problem list.        Review of Systems   Constitutional: Negative for weight loss.   Endocrine: Negative for polyphagia and polyuria.   Genitourinary: Negative for impotence.   Skin: Negative for pallor.   Neurological: Negative for dizziness, tremors, seizures, speech difficulty, weakness and headaches.   Psychiatric/Behavioral: Negative for confusion. The patient is not nervous/anxious.    All other systems reviewed and are negative.      Objective   Blood pressure 128/70, pulse 58, temperature 97.4 °F (36.3 °C),  "temperature source Infrared, resp. rate 16, height 172.7 cm (68\"), weight 104 kg (229 lb 6.4 oz), SpO2 98 %.  Physical Exam  Vitals and nursing note reviewed.   Constitutional:       General: He is not in acute distress.     Appearance: He is well-developed. He is not diaphoretic.   HENT:      Head: Normocephalic and atraumatic.      Nose: Nose normal.   Eyes:      General:         Right eye: No discharge.         Left eye: No discharge.      Conjunctiva/sclera: Conjunctivae normal.   Neck:      Thyroid: No thyromegaly.      Trachea: No tracheal deviation.   Cardiovascular:      Rate and Rhythm: Normal rate and regular rhythm.      Heart sounds: Normal heart sounds.   Pulmonary:      Effort: Pulmonary effort is normal. No respiratory distress.      Breath sounds: Normal breath sounds. No stridor. No wheezing.   Chest:      Chest wall: No tenderness.   Abdominal:      General: Bowel sounds are normal. There is no distension.      Palpations: Abdomen is soft.      Tenderness: There is no abdominal tenderness.   Musculoskeletal:         General: Normal range of motion.      Cervical back: Normal range of motion.   Lymphadenopathy:      Cervical: No cervical adenopathy.   Skin:     General: Skin is warm and dry.   Neurological:      Mental Status: He is alert and oriented to person, place, and time.      Motor: No abnormal muscle tone.      Coordination: Coordination normal.   Psychiatric:         Behavior: Behavior normal.         Thought Content: Thought content normal.         Judgment: Judgment normal.         Assessment/Plan   Problems Addressed this Visit        Cardiac and Vasculature    Hyperlipidemia    Relevant Medications    atorvastatin (LIPITOR) 80 MG tablet    Other Relevant Orders    Lipid Panel       Coag and Thromboembolic    Pulmonary embolism without acute cor pulmonale (CMS/HCC)    Relevant Medications    apixaban (Eliquis) 5 MG tablet tablet    Other Relevant Orders    Adult Transthoracic Echo " Limited W/ Cont if Necessary Per Protocol      Other Visit Diagnoses     Essential hypertension    -  Primary    Relevant Medications    amLODIPine-benazepril (LOTREL) 10-20 MG per capsule    metoprolol tartrate (LOPRESSOR) 50 MG tablet    Other Relevant Orders    Comprehensive Metabolic Panel    CBC (No Diff)    Type 2 diabetes mellitus with hyperglycemia, without long-term current use of insulin (CMS/[facility])        Relevant Medications    Semaglutide,0.25 or 0.5MG/DOS, (OZEMPIC) 2 MG/1.5ML solution pen-injector    glimepiride (AMARYL) 2 MG tablet    metFORMIN (GLUCOPHAGE) 1000 MG tablet    Other Relevant Orders    Comprehensive Metabolic Panel    Hemoglobin A1c    CBC (No Diff)    Class 1 obesity due to excess calories with serious comorbidity and body mass index (BMI) of 34.0 to 34.9 in adult          Diagnoses       Codes Comments    Essential hypertension    -  Primary ICD-10-CM: I10  ICD-9-CM: 401.9     Type 2 diabetes mellitus with hyperglycemia, without long-term current use of insulin (CMS/HCC)     ICD-10-CM: E11.65  ICD-9-CM: 250.00, 790.29     Mixed hyperlipidemia     ICD-10-CM: E78.2  ICD-9-CM: 272.2     Class 1 obesity due to excess calories with serious comorbidity and body mass index (BMI) of 34.0 to 34.9 in adult     ICD-10-CM: E66.09, Z68.34  ICD-9-CM: 278.00, V85.34     Chronic pulmonary embolism without acute cor pulmonale, unspecified pulmonary embolism type (HCC)     ICD-10-CM: I27.82  ICD-9-CM: 416.2                  Answers for HPI/ROS submitted by the patient on 9/21/2021  What is the primary reason for your visit?: Diabetes    Plan:    1.  Diabetes mellitus: Chronic, uncontrolled with hyperglycemia.  Reviewed past hemoglobin A1c.  We will repeat today.  Discussed patient's medications.  Would like to restart him on Ozempic.  Discussed risk and benefits of medication.  Patient is agreeable to medication.  It was previously prescribed however insurance would not cover it.  Advised patient to  contact the office if it is expensive so we could look at alternative options.  He is currently in the donut hole.    2.  Hyperlipidemia: Chronic, controlled.  Reviewed past lipid panel which is above goal.  Patient is currently on high-dose statin.  Will repeat lipid panel to see if improvement has been made.    3.  Pulmonary embolism: Chronic, patient is currently on anticoagulation.  Reviewed past echo.  Will order repeat echo to look for any signs of right heart strain.    4.  Hypertension: Chronic, controlled.  Continue on current medication.  DASH diet.    5.  Obesity: Patient's Body mass index is 34.88 kg/m². indicating that he is obese (BMI >30). Obesity-related health conditions include the following: hypertension, diabetes mellitus and dyslipidemias. Obesity is worsening. BMI is is above average; BMI management plan is completed. We discussed portion control and increasing exercise.          This document has been electronically signed by Pallavi Mtz MD on September 21, 2021 17:32 CDT

## 2021-09-22 LAB
ALBUMIN SERPL-MCNC: 4.7 G/DL (ref 3.5–5.2)
ALBUMIN/GLOB SERPL: 1.8 G/DL
ALP SERPL-CCNC: 82 U/L (ref 39–117)
ALT SERPL-CCNC: 36 U/L (ref 1–41)
AST SERPL-CCNC: 28 U/L (ref 1–40)
BILIRUB SERPL-MCNC: 0.6 MG/DL (ref 0–1.2)
BUN SERPL-MCNC: 17 MG/DL (ref 8–23)
BUN/CREAT SERPL: 13 (ref 7–25)
CALCIUM SERPL-MCNC: 9.2 MG/DL (ref 8.6–10.5)
CHLORIDE SERPL-SCNC: 104 MMOL/L (ref 98–107)
CHOLEST SERPL-MCNC: 214 MG/DL (ref 0–200)
CO2 SERPL-SCNC: 21.2 MMOL/L (ref 22–29)
CREAT SERPL-MCNC: 1.31 MG/DL (ref 0.76–1.27)
ERYTHROCYTE [DISTWIDTH] IN BLOOD BY AUTOMATED COUNT: 13.4 % (ref 12.3–15.4)
GLOBULIN SER CALC-MCNC: 2.6 GM/DL
GLUCOSE SERPL-MCNC: 184 MG/DL (ref 65–99)
HBA1C MFR BLD: 9.18 % (ref 4.8–5.6)
HCT VFR BLD AUTO: 42.1 % (ref 37.5–51)
HDLC SERPL-MCNC: 28 MG/DL (ref 40–60)
HGB BLD-MCNC: 13.9 G/DL (ref 13–17.7)
LDLC SERPL CALC-MCNC: 141 MG/DL (ref 0–100)
MCH RBC QN AUTO: 29.1 PG (ref 26.6–33)
MCHC RBC AUTO-ENTMCNC: 33 G/DL (ref 31.5–35.7)
MCV RBC AUTO: 88.1 FL (ref 79–97)
PLATELET # BLD AUTO: 229 10*3/MM3 (ref 140–450)
POTASSIUM SERPL-SCNC: 4.4 MMOL/L (ref 3.5–5.2)
PROT SERPL-MCNC: 7.3 G/DL (ref 6–8.5)
RBC # BLD AUTO: 4.78 10*6/MM3 (ref 4.14–5.8)
SODIUM SERPL-SCNC: 139 MMOL/L (ref 136–145)
TRIGL SERPL-MCNC: 246 MG/DL (ref 0–150)
VLDLC SERPL CALC-MCNC: 45 MG/DL (ref 5–40)
WBC # BLD AUTO: 8.43 10*3/MM3 (ref 3.4–10.8)

## 2021-09-23 ENCOUNTER — TELEPHONE (OUTPATIENT)
Dept: FAMILY MEDICINE CLINIC | Facility: CLINIC | Age: 76
End: 2021-09-23

## 2021-09-23 NOTE — TELEPHONE ENCOUNTER
Patient called to report that medication that was ordered is too expensive. Patient reports that PCP told him to call if medicine is to expensive.

## 2021-09-28 DIAGNOSIS — E11.65 TYPE 2 DIABETES MELLITUS WITH HYPERGLYCEMIA, WITHOUT LONG-TERM CURRENT USE OF INSULIN (HCC): Primary | ICD-10-CM

## 2021-09-28 RX ORDER — DULAGLUTIDE 0.75 MG/.5ML
0.75 INJECTION, SOLUTION SUBCUTANEOUS WEEKLY
Qty: 5 PEN | Refills: 2 | Status: SHIPPED | OUTPATIENT
Start: 2021-09-28 | End: 2021-11-01

## 2021-11-01 ENCOUNTER — OFFICE VISIT (OUTPATIENT)
Dept: FAMILY MEDICINE CLINIC | Facility: CLINIC | Age: 76
End: 2021-11-01

## 2021-11-01 VITALS
HEIGHT: 68 IN | OXYGEN SATURATION: 98 % | DIASTOLIC BLOOD PRESSURE: 68 MMHG | HEART RATE: 58 BPM | TEMPERATURE: 97.3 F | RESPIRATION RATE: 16 BRPM | WEIGHT: 232.2 LBS | BODY MASS INDEX: 35.19 KG/M2 | SYSTOLIC BLOOD PRESSURE: 130 MMHG

## 2021-11-01 DIAGNOSIS — Z00.00 MEDICARE ANNUAL WELLNESS VISIT, SUBSEQUENT: Primary | ICD-10-CM

## 2021-11-01 DIAGNOSIS — R94.4 DECREASED GFR: ICD-10-CM

## 2021-11-01 PROCEDURE — 1126F AMNT PAIN NOTED NONE PRSNT: CPT | Performed by: FAMILY MEDICINE

## 2021-11-01 PROCEDURE — 1160F RVW MEDS BY RX/DR IN RCRD: CPT | Performed by: FAMILY MEDICINE

## 2021-11-01 PROCEDURE — G0439 PPPS, SUBSEQ VISIT: HCPCS | Performed by: FAMILY MEDICINE

## 2021-11-01 PROCEDURE — 1170F FXNL STATUS ASSESSED: CPT | Performed by: FAMILY MEDICINE

## 2021-11-01 NOTE — PROGRESS NOTES
The ABCs of the Annual Wellness Visit  Subsequent Medicare Wellness Visit    Chief Complaint   Patient presents with   • Medicare Wellness-subsequent     Pt here for medicare wellness exam       Subjective    History of Present Illness:  Cordell Carreon is a 76 y.o. male who presents for a Subsequent Medicare Wellness Visit.    The following portions of the patient's history were reviewed and   updated as appropriate: allergies, current medications, past family history, past medical history, past social history, past surgical history and problem list.    Compared to one year ago, the patient feels his physical   health is the same.    Compared to one year ago, the patient feels his mental   health is the same.    Recent Hospitalizations:  This patient has had a [facility] admission record on file within the last 365 days.    Current Medical Providers:  Patient Care Team:  Pallavi Mtz MD as PCP - General (Family Medicine)    Outpatient Medications Prior to Visit   Medication Sig Dispense Refill   • amLODIPine-benazepril (LOTREL) 10-20 MG per capsule Take 1 capsule by mouth Daily. 90 capsule 3   • apixaban (Eliquis) 5 MG tablet tablet Take 1 tablet by mouth Every 12 (Twelve) Hours. 60 tablet 2   • aspirin 81 MG EC tablet Take 81 mg by mouth Daily.     • atorvastatin (LIPITOR) 80 MG tablet Take 1 tablet by mouth Daily. 90 tablet 3   • benazepril (LOTENSIN) 5 MG tablet TAKE 1 TABLET BY MOUTH EVERY DAY 90 tablet 0   • glimepiride (AMARYL) 2 MG tablet Take 1 tablet by mouth Every Morning Before Breakfast. 90 tablet 3   • Insulin Glargine (BASAGLAR KWIKPEN) 100 UNIT/ML injection pen Inject 30 Units under the skin into the appropriate area as directed Every Night. 10 pen 3   • metFORMIN (GLUCOPHAGE) 1000 MG tablet Take 1 tablet by mouth 2 (Two) Times a Day With Meals. 180 tablet 3   • metoprolol tartrate (LOPRESSOR) 50 MG tablet Take 1 tablet by mouth Every 12 (Twelve) Hours. 180 tablet 3   • Dulaglutide  "(Trulicity) 0.75 MG/0.5ML solution pen-injector Inject 0.75 mg under the skin into the appropriate area as directed 1 (One) Time Per Week. 5 pen 2     No facility-administered medications prior to visit.       No opioid medication identified on active medication list. I have reviewed chart for other potential  high risk medication/s and harmful drug interactions in the elderly.          Aspirin is on active medication list. Aspirin use is indicated based on review of current medical condition/s. Pros and cons of this therapy have been discussed today. Benefits of this medication outweigh potential harm.  Patient has been encouraged to continue taking this medication.  .      Patient Active Problem List   Diagnosis   • Need for hepatitis C screening test   • Type 2 diabetes mellitus with hyperglycemia, with long-term current use of insulin (HCC)   • H/O heart bypass surgery   • Benign essential HTN   • Hyperlipidemia   • BMI 35.0-35.9,adult   • History of colon polyps   • Class 2 severe obesity due to excess calories with serious comorbidity and body mass index (BMI) of 35.0 to 35.9 in adult (HCC)   • Pulmonary embolism without acute cor pulmonale (HCC)   • Hypoxia secondary to acute bilateral pulmonary emboli   • Deep vein thrombosis (DVT) of popliteal vein of right lower extremity (HCC)     Advance Care Planning  Advance Directive is not on file.  ACP discussion was held with the patient during this visit. Patient has an advance directive (not in EMR), copy requested.          Objective    Vitals:    11/01/21 1106   BP: 130/68   BP Location: Right arm   Patient Position: Sitting   Cuff Size: Adult   Pulse: 58   Resp: 16   Temp: 97.3 °F (36.3 °C)   TempSrc: Infrared   SpO2: 98%   Weight: 105 kg (232 lb 3.2 oz)   Height: 172.7 cm (68\")   PainSc: 0-No pain     BMI Readings from Last 1 Encounters:   11/01/21 35.31 kg/m²   BMI is above normal parameters. Recommendations include: educational material, exercise counseling " and nutrition counseling    Does the patient have evidence of cognitive impairment? No    Physical Exam  Cardiovascular:      Pulses:           Dorsalis pedis pulses are 2+ on the right side and 2+ on the left side.        Posterior tibial pulses are 2+ on the right side and 2+ on the left side.   Musculoskeletal:      Right foot: Normal range of motion.      Left foot: Normal range of motion.   Feet:      Right foot:      Skin integrity: Skin integrity normal.      Left foot:      Skin integrity: Skin integrity normal.       Lab Results   Component Value Date    CHLPL 214 (H) 09/21/2021    TRIG 246 (H) 09/21/2021    HDL 28 (L) 09/21/2021     (H) 09/21/2021    VLDL 45 (H) 09/21/2021    HGBA1C 9.18 (H) 09/21/2021            HEALTH RISK ASSESSMENT    Smoking Status:  Social History     Tobacco Use   Smoking Status Never Smoker   Smokeless Tobacco Never Used     Alcohol Consumption:  Social History     Substance and Sexual Activity   Alcohol Use Not Currently     Fall Risk Screen:    STEADI Fall Risk Assessment was completed, and patient is at LOW risk for falls.Assessment completed on:11/1/2021    Depression Screening:  PHQ-2/PHQ-9 Depression Screening 11/1/2021   Little interest or pleasure in doing things 0   Feeling down, depressed, or hopeless 0   Total Score 0       Health Habits and Functional and Cognitive Screening:  Functional & Cognitive Status 11/1/2021   Do you have difficulty preparing food and eating? No   Do you have difficulty bathing yourself, getting dressed or grooming yourself? No   Do you have difficulty using the toilet? No   Do you have difficulty moving around from place to place? No   Do you have trouble with steps or getting out of a bed or a chair? No   Current Diet Well Balanced Diet   Dental Exam Up to date   Eye Exam Up to date   Exercise (times per week) 7 times per week   Current Exercises Include Other   Current Exercise Activities Include -   Do you need help using the phone?   No   Are you deaf or do you have serious difficulty hearing?  No   Do you need help with transportation? No   Do you need help shopping? No   Do you need help preparing meals?  No   Do you need help with housework?  No   Do you need help with laundry? No   Do you need help taking your medications? No   Do you need help managing money? No   Do you ever drive or ride in a car without wearing a seat belt? No   Have you felt unusual stress, anger or loneliness in the last month? No   Who do you live with? Spouse   If you need help, do you have trouble finding someone available to you? No   Have you been bothered in the last four weeks by sexual problems? No   Do you have difficulty concentrating, remembering or making decisions? No       Age-appropriate Screening Schedule:  Refer to the list below for future screening recommendations based on patient's age, sex and/or medical conditions. Orders for these recommended tests are listed in the plan section. The patient has been provided with a written plan.    Health Maintenance   Topic Date Due   • ZOSTER VACCINE (2 of 2) 01/26/2017   • DIABETIC FOOT EXAM  10/02/2018   • INFLUENZA VACCINE  08/01/2021   • HEMOGLOBIN A1C  03/21/2022   • DIABETIC EYE EXAM  06/02/2022   • URINE MICROALBUMIN  06/17/2022   • LIPID PANEL  09/21/2022   • TDAP/TD VACCINES (2 - Td or Tdap) 12/01/2026              Assessment/Plan   CMS Preventative Services Quick Reference  Risk Factors Identified During Encounter  Cardiovascular Disease  Immunizations Discussed/Encouraged (specific Immunizations; Tdap, Influenza and Shingrix  Inactivity/Sedentary  Obesity/Overweight   The above risks/problems have been discussed with the patient.  Follow up actions/plans if indicated are seen below in the Assessment/Plan Section.  Pertinent information has been shared with the patient in the After Visit Summary.    Diagnoses and all orders for this visit:    1. Medicare annual wellness visit, subsequent  (Primary)    2. Decreased GFR  -     Basic Metabolic Panel        Follow Up:   Return in about 1 year (around 11/1/2022), or if symptoms worsen or fail to improve, for Medicare Wellness.     An After Visit Summary and PPPS were made available to the patient.      I spent 25 minutes caring for Cordell JAMIL on this date of service. This time includes time spent by me in the following activities:preparing for the visit, reviewing tests, obtaining and/or reviewing a separately obtained history, performing a medically appropriate examination and/or evaluation , counseling and educating the patient/family/caregiver, ordering medications, tests, or procedures, documenting information in the medical record and independently interpreting results and communicating that information with the patient/family/caregiver             This document has been electronically signed by Pallavi Mtz MD on November 1, 2021 11:32 CDT

## 2021-11-02 LAB
BUN SERPL-MCNC: 15 MG/DL (ref 8–23)
BUN/CREAT SERPL: 12.4 (ref 7–25)
CALCIUM SERPL-MCNC: 9.2 MG/DL (ref 8.6–10.5)
CHLORIDE SERPL-SCNC: 104 MMOL/L (ref 98–107)
CO2 SERPL-SCNC: 21.6 MMOL/L (ref 22–29)
CREAT SERPL-MCNC: 1.21 MG/DL (ref 0.76–1.27)
GLUCOSE SERPL-MCNC: 195 MG/DL (ref 65–99)
POTASSIUM SERPL-SCNC: 4.1 MMOL/L (ref 3.5–5.2)
SODIUM SERPL-SCNC: 140 MMOL/L (ref 136–145)

## 2021-11-04 ENCOUNTER — HOSPITAL ENCOUNTER (OUTPATIENT)
Dept: CARDIOLOGY | Facility: HOSPITAL | Age: 76
Discharge: HOME | End: 2021-11-04
Admitting: FAMILY MEDICINE

## 2021-11-04 DIAGNOSIS — I27.82 CHRONIC PULMONARY EMBOLISM WITHOUT ACUTE COR PULMONALE, UNSPECIFIED PULMONARY EMBOLISM TYPE (HCC): ICD-10-CM

## 2021-11-04 PROCEDURE — 25010000002 PERFLUTREN 6.52 MG/ML SUSPENSION: Performed by: FAMILY MEDICINE

## 2021-11-04 PROCEDURE — 93308 TTE F-UP OR LMTD: CPT

## 2021-11-04 PROCEDURE — 93325 DOPPLER ECHO COLOR FLOW MAPG: CPT

## 2021-11-04 PROCEDURE — 93321 DOPPLER ECHO F-UP/LMTD STD: CPT | Performed by: INTERNAL MEDICINE

## 2021-11-04 PROCEDURE — 93308 TTE F-UP OR LMTD: CPT | Performed by: INTERNAL MEDICINE

## 2021-11-04 PROCEDURE — 93325 DOPPLER ECHO COLOR FLOW MAPG: CPT | Performed by: INTERNAL MEDICINE

## 2021-11-04 PROCEDURE — 93321 DOPPLER ECHO F-UP/LMTD STD: CPT

## 2021-11-04 RX ADMIN — PERFLUTREN 1.17 MG: 6.52 INJECTION, SUSPENSION INTRAVENOUS at 09:20

## 2021-11-05 LAB
BH CV ECHO MEAS - AO MAX PG (FULL): 2.5 MMHG
BH CV ECHO MEAS - AO MAX PG: 4.4 MMHG
BH CV ECHO MEAS - AO MEAN PG (FULL): 1 MMHG
BH CV ECHO MEAS - AO MEAN PG: 2 MMHG
BH CV ECHO MEAS - AO ROOT AREA (BSA CORRECTED): 1.7
BH CV ECHO MEAS - AO ROOT AREA: 10.2 CM^2
BH CV ECHO MEAS - AO ROOT DIAM: 3.6 CM
BH CV ECHO MEAS - AO V2 MAX: 105 CM/SEC
BH CV ECHO MEAS - AO V2 MEAN: 69.1 CM/SEC
BH CV ECHO MEAS - AO V2 VTI: 23 CM
BH CV ECHO MEAS - AVA(I,A): 2.6 CM^2
BH CV ECHO MEAS - AVA(I,D): 2.6 CM^2
BH CV ECHO MEAS - AVA(V,A): 2.3 CM^2
BH CV ECHO MEAS - AVA(V,D): 2.3 CM^2
BH CV ECHO MEAS - BSA(HAYCOCK): 2.3 M^2
BH CV ECHO MEAS - BSA: 2.2 M^2
BH CV ECHO MEAS - BZI_BMI: 35.3 KILOGRAMS/M^2
BH CV ECHO MEAS - BZI_METRIC_HEIGHT: 172.7 CM
BH CV ECHO MEAS - BZI_METRIC_WEIGHT: 105.2 KG
BH CV ECHO MEAS - EDV(CUBED): 74.6 ML
BH CV ECHO MEAS - EDV(MOD-SP2): 108 ML
BH CV ECHO MEAS - EDV(MOD-SP4): 99.5 ML
BH CV ECHO MEAS - EDV(TEICH): 79 ML
BH CV ECHO MEAS - EF(CUBED): 58.1 %
BH CV ECHO MEAS - EF(MOD-SP2): 64.5 %
BH CV ECHO MEAS - EF(MOD-SP4): 56.2 %
BH CV ECHO MEAS - EF(TEICH): 50.1 %
BH CV ECHO MEAS - ESV(CUBED): 31.3 ML
BH CV ECHO MEAS - ESV(MOD-SP2): 38.3 ML
BH CV ECHO MEAS - ESV(MOD-SP4): 43.6 ML
BH CV ECHO MEAS - ESV(TEICH): 39.4 ML
BH CV ECHO MEAS - FS: 25.2 %
BH CV ECHO MEAS - IVS/LVPW: 1.3
BH CV ECHO MEAS - IVSD: 1.2 CM
BH CV ECHO MEAS - LA DIMENSION: 4.5 CM
BH CV ECHO MEAS - LA/AO: 1.3
BH CV ECHO MEAS - LAT PEAK E' VEL: 10.1 CM/SEC
BH CV ECHO MEAS - LV DIASTOLIC VOL/BSA (35-75): 45.7 ML/M^2
BH CV ECHO MEAS - LV MASS(C)D: 150.6 GRAMS
BH CV ECHO MEAS - LV MASS(C)DI: 69.2 GRAMS/M^2
BH CV ECHO MEAS - LV MAX PG: 2 MMHG
BH CV ECHO MEAS - LV MEAN PG: 1 MMHG
BH CV ECHO MEAS - LV SYSTOLIC VOL/BSA (12-30): 20 ML/M^2
BH CV ECHO MEAS - LV V1 MAX: 70 CM/SEC
BH CV ECHO MEAS - LV V1 MEAN: 44.1 CM/SEC
BH CV ECHO MEAS - LV V1 VTI: 17.1 CM
BH CV ECHO MEAS - LVIDD: 4.2 CM
BH CV ECHO MEAS - LVIDS: 3.2 CM
BH CV ECHO MEAS - LVLD AP2: 8.6 CM
BH CV ECHO MEAS - LVLD AP4: 8 CM
BH CV ECHO MEAS - LVLS AP2: 7 CM
BH CV ECHO MEAS - LVLS AP4: 6.8 CM
BH CV ECHO MEAS - LVOT AREA (M): 3.5 CM^2
BH CV ECHO MEAS - LVOT AREA: 3.5 CM^2
BH CV ECHO MEAS - LVOT DIAM: 2.1 CM
BH CV ECHO MEAS - LVPWD: 0.92 CM
BH CV ECHO MEAS - MED PEAK E' VEL: 8.16 CM/SEC
BH CV ECHO MEAS - MR MAX PG: 34.7 MMHG
BH CV ECHO MEAS - MR MAX VEL: 294.3 CM/SEC
BH CV ECHO MEAS - MV A MAX VEL: 60.2 CM/SEC
BH CV ECHO MEAS - MV DEC TIME: 0.29 SEC
BH CV ECHO MEAS - MV E MAX VEL: 69.1 CM/SEC
BH CV ECHO MEAS - MV E/A: 1.1
BH CV ECHO MEAS - SI(AO): 107.5 ML/M^2
BH CV ECHO MEAS - SI(CUBED): 19.9 ML/M^2
BH CV ECHO MEAS - SI(LVOT): 27.2 ML/M^2
BH CV ECHO MEAS - SI(MOD-SP2): 32 ML/M^2
BH CV ECHO MEAS - SI(MOD-SP4): 25.7 ML/M^2
BH CV ECHO MEAS - SI(TEICH): 18.2 ML/M^2
BH CV ECHO MEAS - SV(AO): 234.1 ML
BH CV ECHO MEAS - SV(CUBED): 43.4 ML
BH CV ECHO MEAS - SV(LVOT): 59.2 ML
BH CV ECHO MEAS - SV(MOD-SP2): 69.7 ML
BH CV ECHO MEAS - SV(MOD-SP4): 55.9 ML
BH CV ECHO MEAS - SV(TEICH): 39.6 ML
BH CV ECHO MEAS - TR MAX VEL: 232 CM/SEC
BH CV ECHO MEASUREMENTS AVERAGE E/E' RATIO: 7.57
LEFT ATRIUM VOLUME INDEX: 28.1 ML/M2
LEFT ATRIUM VOLUME: 61.3 CM3

## 2021-11-20 DIAGNOSIS — I10 ESSENTIAL HYPERTENSION: ICD-10-CM

## 2021-11-22 RX ORDER — BENAZEPRIL HYDROCHLORIDE 5 MG/1
TABLET, FILM COATED ORAL
Qty: 90 TABLET | Refills: 0 | Status: SHIPPED | OUTPATIENT
Start: 2021-11-22 | End: 2022-02-24

## 2021-11-22 NOTE — TELEPHONE ENCOUNTER
Rx Refill Note  Requested Prescriptions     Pending Prescriptions Disp Refills   • benazepril (LOTENSIN) 5 MG tablet [Pharmacy Med Name: BENAZEPRIL HCL 5 MG TABLET] 90 tablet 0     Sig: TAKE 1 TABLET BY MOUTH EVERY DAY      Last office visit with prescribing clinician:   11/1/2021    Next office visit with prescribing clinician: 01/04/22    LRX:9/6/2021-3 months    Latha Birch MA  11/22/21, 14:21 CST

## 2021-12-09 ENCOUNTER — TELEPHONE (OUTPATIENT)
Dept: FAMILY MEDICINE CLINIC | Facility: CLINIC | Age: 76
End: 2021-12-09

## 2021-12-09 NOTE — TELEPHONE ENCOUNTER
Caller: Cordell Carreon    Relationship: Self    Best call back number: [phone]        Who are you requesting to speak with (clinical staff, provider,  specific staff member): CLINICAL STAFF    Do you know the name of the person who called: PATIENT    What was the call regarding: OZEMPIC 5 MG    Do you require a callback: YES

## 2021-12-10 ENCOUNTER — CLINICAL SUPPORT (OUTPATIENT)
Dept: FAMILY MEDICINE CLINIC | Facility: CLINIC | Age: 76
End: 2021-12-10

## 2021-12-10 DIAGNOSIS — Z79.4 TYPE 2 DIABETES MELLITUS WITH HYPERGLYCEMIA, WITH LONG-TERM CURRENT USE OF INSULIN (HCC): Primary | ICD-10-CM

## 2021-12-10 DIAGNOSIS — E11.65 TYPE 2 DIABETES MELLITUS WITH HYPERGLYCEMIA, WITH LONG-TERM CURRENT USE OF INSULIN (HCC): Primary | ICD-10-CM

## 2022-01-04 ENCOUNTER — OFFICE VISIT (OUTPATIENT)
Dept: FAMILY MEDICINE CLINIC | Facility: CLINIC | Age: 77
End: 2022-01-04

## 2022-01-04 VITALS
HEIGHT: 68 IN | HEART RATE: 58 BPM | TEMPERATURE: 97.3 F | SYSTOLIC BLOOD PRESSURE: 140 MMHG | WEIGHT: 227.8 LBS | DIASTOLIC BLOOD PRESSURE: 78 MMHG | OXYGEN SATURATION: 98 % | BODY MASS INDEX: 34.53 KG/M2 | RESPIRATION RATE: 16 BRPM

## 2022-01-04 DIAGNOSIS — E11.65 TYPE 2 DIABETES MELLITUS WITH HYPERGLYCEMIA, WITH LONG-TERM CURRENT USE OF INSULIN: ICD-10-CM

## 2022-01-04 DIAGNOSIS — Z95.1 H/O HEART BYPASS SURGERY: ICD-10-CM

## 2022-01-04 DIAGNOSIS — E66.09 CLASS 1 OBESITY DUE TO EXCESS CALORIES WITH SERIOUS COMORBIDITY AND BODY MASS INDEX (BMI) OF 34.0 TO 34.9 IN ADULT: ICD-10-CM

## 2022-01-04 DIAGNOSIS — R94.4 DECREASED GFR: ICD-10-CM

## 2022-01-04 DIAGNOSIS — Z79.4 TYPE 2 DIABETES MELLITUS WITH HYPERGLYCEMIA, WITH LONG-TERM CURRENT USE OF INSULIN: ICD-10-CM

## 2022-01-04 DIAGNOSIS — I10 ESSENTIAL HYPERTENSION: Primary | ICD-10-CM

## 2022-01-04 DIAGNOSIS — I27.82 CHRONIC PULMONARY EMBOLISM WITHOUT ACUTE COR PULMONALE, UNSPECIFIED PULMONARY EMBOLISM TYPE: ICD-10-CM

## 2022-01-04 DIAGNOSIS — E78.2 MIXED HYPERLIPIDEMIA: ICD-10-CM

## 2022-01-04 PROBLEM — Z11.59 NEED FOR HEPATITIS C SCREENING TEST: Status: RESOLVED | Noted: 2017-03-01 | Resolved: 2022-01-04

## 2022-01-04 PROCEDURE — 99214 OFFICE O/P EST MOD 30 MIN: CPT | Performed by: FAMILY MEDICINE

## 2022-01-04 RX ORDER — INSULIN ASPART 100 [IU]/ML
10 INJECTION, SOLUTION INTRAVENOUS; SUBCUTANEOUS
Qty: 5 PEN | Refills: 5 | Status: SHIPPED | OUTPATIENT
Start: 2022-01-04 | End: 2022-05-03 | Stop reason: SDUPTHER

## 2022-01-04 RX ORDER — APIXABAN 5 MG/1
TABLET, FILM COATED ORAL
Qty: 60 TABLET | Refills: 2 | Status: SHIPPED | OUTPATIENT
Start: 2022-01-04 | End: 2022-04-26

## 2022-01-04 NOTE — TELEPHONE ENCOUNTER
Rx Refill Note  Requested Prescriptions     Pending Prescriptions Disp Refills   • Eliquis 5 MG tablet tablet [Pharmacy Med Name: ELIQUIS 5 MG TABLET] 60 tablet 2     Sig: TAKE 1 TABLET BY MOUTH EVERY 12 HOURS      Last office visit with prescribing clinician: 11/1/2021      Next office visit with prescribing clinician: 1/4/2022            Lilli Rose LPN  01/04/22, 10:36 CST

## 2022-01-04 NOTE — PROGRESS NOTES
"Subjective cc: Diabetes  Cordell Carreon is a 76 y.o. male with hypertension, uncontrolled diabetes mellitus, obesity, CAD status post CABG, hyperlipidemia, history of DVT and PE who presents for follow-up.   He feels like his HR and BP are up since starting ozempic and he is developing GERD, he is also not eligible for coupon cards.     Hypertension is borderline controlled.  Patient currently taking a combination of amlodipine, benazepril, and metoprolol. Reports it has been higher in the 150s at home   Diabetes has been uncontrolled.  Patient has been taking samples of Ozempic, Metformin, glimepiride, and Basaglar. He is noticing side effects with ozempic   History of DVT and PE: Patient is currently anticoagulated with Eliquis.  Obesity: Stable  CAD status post CABG: Patient on Eliquis, aspirin, statin, ACE, beta-blocker  Hyperlipidemia: On high-dose statin, reviewed lipid panel.    History of Present Illness     The following portions of the patient's history were reviewed and updated as appropriate: allergies, current medications, past family history, past medical history, past social history, past surgical history and problem list.        Review of Systems   All other systems reviewed and are negative.      Objective   Blood pressure 140/78, pulse 58, temperature 97.3 °F (36.3 °C), temperature source Infrared, resp. rate 16, height 172.7 cm (68\"), weight 103 kg (227 lb 12.8 oz), SpO2 98 %.  Physical Exam  Vitals and nursing note reviewed.   Constitutional:       General: He is not in acute distress.     Appearance: He is well-developed. He is obese. He is not diaphoretic.   HENT:      Head: Normocephalic and atraumatic.      Right Ear: External ear normal.      Left Ear: External ear normal.      Nose: Nose normal.   Eyes:      General:         Right eye: No discharge.         Left eye: No discharge.      Conjunctiva/sclera: Conjunctivae normal.   Neck:      Thyroid: No thyromegaly.      Trachea: No tracheal " deviation.   Cardiovascular:      Rate and Rhythm: Normal rate and regular rhythm.      Heart sounds: Normal heart sounds.   Pulmonary:      Effort: Pulmonary effort is normal. No respiratory distress.      Breath sounds: Normal breath sounds. No stridor. No wheezing.   Chest:      Chest wall: No tenderness.   Abdominal:      General: There is no distension.      Palpations: Abdomen is soft.      Tenderness: There is no abdominal tenderness.   Musculoskeletal:         General: Normal range of motion.      Cervical back: Normal range of motion.   Lymphadenopathy:      Cervical: No cervical adenopathy.   Skin:     General: Skin is warm and dry.   Neurological:      Mental Status: He is alert and oriented to person, place, and time.      Motor: No abnormal muscle tone.      Coordination: Coordination normal.   Psychiatric:         Behavior: Behavior normal.         Thought Content: Thought content normal.         Judgment: Judgment normal.         Assessment/Plan   Problems Addressed this Visit        Cardiac and Vasculature    H/O heart bypass surgery    Hyperlipidemia       Endocrine and Metabolic    Type 2 diabetes mellitus with hyperglycemia, with long-term current use of insulin (HCC)    Relevant Medications    insulin aspart (NovoLOG FlexPen) 100 UNIT/ML solution pen-injector sc pen    Other Relevant Orders    Hemoglobin A1c      Other Visit Diagnoses     Essential hypertension    -  Primary    Decreased GFR        Relevant Orders    Basic Metabolic Panel    Class 1 obesity due to excess calories with serious comorbidity and body mass index (BMI) of 34.0 to 34.9 in adult          Diagnoses       Codes Comments    Essential hypertension    -  Primary ICD-10-CM: I10  ICD-9-CM: 401.9     Type 2 diabetes mellitus with hyperglycemia, with long-term current use of insulin (HCC)     ICD-10-CM: E11.65, Z79.4  ICD-9-CM: 250.00, 790.29, V58.67     Decreased GFR     ICD-10-CM: R94.4  ICD-9-CM: 794.4     Mixed hyperlipidemia      ICD-10-CM: E78.2  ICD-9-CM: 272.2     H/O heart bypass surgery     ICD-10-CM: Z95.1  ICD-9-CM: V45.81     Class 1 obesity due to excess calories with serious comorbidity and body mass index (BMI) of 34.0 to 34.9 in adult     ICD-10-CM: E66.09, Z68.34  ICD-9-CM: 278.00, V85.34         Plan:    #1 hypertension: Chronic, worsening.  Patient to continue monitoring his blood pressure.  Contact the office in 2 weeks if blood pressure still above goal at home.  Patient is agreeable to this.  Otherwise continue on current medications.    #2 Decreased GFR: New, will repeat labs for further evaluation.    3.  Diabetes mellitus: Chronic, uncontrolled with hyperglycemia.  Reviewed most recent hemoglobin A1c.  We will repeat today.  Discussed medication options with patient.  Since he is having side effects to Ozempic and cost is an issue will discontinue this medication.  He will continue on other medications.  We will add short acting insulin to his regimen.  Discussed on risk and benefits of medication.  Advised on administration.  Will start with 10 units 3 times daily as needed with meals.  Advised patient he can adjust based on meal size.  Contact the office with any questions.    4.  Hyperlipidemia: Chronic, stable.  Reviewed lipid panel.  Continue on statin.    5.  Obesity: Patient's Body mass index is 34.64 kg/m². indicating that he is obese (BMI >30). Obesity-related health conditions include the following: hypertension, coronary heart disease, diabetes mellitus and dyslipidemias. Obesity is unchanged. BMI is is above average; BMI management plan is completed. We discussed portion control and increasing exercise..            This document has been electronically signed by Pallavi Mtz MD on January 4, 2022 11:13 CST

## 2022-01-05 LAB
BUN SERPL-MCNC: 15 MG/DL (ref 8–23)
BUN/CREAT SERPL: 11.7 (ref 7–25)
CALCIUM SERPL-MCNC: 9.5 MG/DL (ref 8.6–10.5)
CHLORIDE SERPL-SCNC: 104 MMOL/L (ref 98–107)
CO2 SERPL-SCNC: 23.8 MMOL/L (ref 22–29)
CREAT SERPL-MCNC: 1.28 MG/DL (ref 0.76–1.27)
GLUCOSE SERPL-MCNC: 179 MG/DL (ref 65–99)
HBA1C MFR BLD: 8.7 % (ref 4.8–5.6)
POTASSIUM SERPL-SCNC: 4.7 MMOL/L (ref 3.5–5.2)
SODIUM SERPL-SCNC: 141 MMOL/L (ref 136–145)

## 2022-01-12 DIAGNOSIS — I10 ESSENTIAL HYPERTENSION: ICD-10-CM

## 2022-01-12 NOTE — TELEPHONE ENCOUNTER
Too early to refill.     Rx Refill Note  Requested Prescriptions     Pending Prescriptions Disp Refills   • benazepril (LOTENSIN) 5 MG tablet [Pharmacy Med Name: BENAZEPRIL HCL 5 MG TABLET] 90 tablet 0     Sig: TAKE 1 TABLET BY MOUTH EVERY DAY      Last office visit with prescribing clinician: 1/4/2022      Next office visit with prescribing clinician: 4/7/2022            Lilli Rose LPN  01/12/22, 14:30 CST

## 2022-01-13 RX ORDER — BENAZEPRIL HYDROCHLORIDE 5 MG/1
TABLET, FILM COATED ORAL
Qty: 90 TABLET | Refills: 0 | OUTPATIENT
Start: 2022-01-13

## 2022-02-23 DIAGNOSIS — I10 ESSENTIAL HYPERTENSION: ICD-10-CM

## 2022-02-23 NOTE — TELEPHONE ENCOUNTER
Rx Refill Note  Requested Prescriptions     Pending Prescriptions Disp Refills   • benazepril (LOTENSIN) 5 MG tablet [Pharmacy Med Name: BENAZEPRIL HCL 5 MG TABLET] 90 tablet 0     Sig: TAKE 1 TABLET BY MOUTH EVERY DAY      Last office visit with prescribing clinician: 1/4/2022      Next office visit with prescribing clinician: 4/7/2022            Jodie Byrd MA  02/23/22, 10:04 CST

## 2022-02-24 RX ORDER — BENAZEPRIL HYDROCHLORIDE 5 MG/1
TABLET, FILM COATED ORAL
Qty: 90 TABLET | Refills: 0 | Status: SHIPPED | OUTPATIENT
Start: 2022-02-24 | End: 2022-05-03 | Stop reason: SDUPTHER

## 2022-03-01 RX ORDER — PEN NEEDLE, DIABETIC 32GX 5/32"
NEEDLE, DISPOSABLE MISCELLANEOUS
Qty: 100 EACH | Refills: 3 | Status: SHIPPED | OUTPATIENT
Start: 2022-03-01 | End: 2022-08-08 | Stop reason: SDUPTHER

## 2022-03-30 ENCOUNTER — OFFICE VISIT (OUTPATIENT)
Dept: ENT CLINIC | Age: 77
End: 2022-03-30
Payer: MEDICARE

## 2022-03-30 VITALS
BODY MASS INDEX: 34.86 KG/M2 | WEIGHT: 230 LBS | DIASTOLIC BLOOD PRESSURE: 80 MMHG | HEIGHT: 68 IN | SYSTOLIC BLOOD PRESSURE: 134 MMHG

## 2022-03-30 DIAGNOSIS — T16.1XXA FOREIGN BODY OF RIGHT EAR, INITIAL ENCOUNTER: Primary | ICD-10-CM

## 2022-03-30 DIAGNOSIS — H90.3 SENSORINEURAL HEARING LOSS (SNHL) OF BOTH EARS: ICD-10-CM

## 2022-03-30 DIAGNOSIS — H62.41 OTITIS EXTERNA IN OTHER DISEASES CLASSIFIED ELSEWHERE, RIGHT EAR: ICD-10-CM

## 2022-03-30 PROCEDURE — G8427 DOCREV CUR MEDS BY ELIG CLIN: HCPCS | Performed by: OTOLARYNGOLOGY

## 2022-03-30 PROCEDURE — 3017F COLORECTAL CA SCREEN DOC REV: CPT | Performed by: OTOLARYNGOLOGY

## 2022-03-30 PROCEDURE — 4130F TOPICAL PREP RX AOE: CPT | Performed by: OTOLARYNGOLOGY

## 2022-03-30 PROCEDURE — 1123F ACP DISCUSS/DSCN MKR DOCD: CPT | Performed by: OTOLARYNGOLOGY

## 2022-03-30 PROCEDURE — 69200 CLEAR OUTER EAR CANAL: CPT | Performed by: OTOLARYNGOLOGY

## 2022-03-30 PROCEDURE — 4040F PNEUMOC VAC/ADMIN/RCVD: CPT | Performed by: OTOLARYNGOLOGY

## 2022-03-30 PROCEDURE — G8417 CALC BMI ABV UP PARAM F/U: HCPCS | Performed by: OTOLARYNGOLOGY

## 2022-03-30 PROCEDURE — G8484 FLU IMMUNIZE NO ADMIN: HCPCS | Performed by: OTOLARYNGOLOGY

## 2022-03-30 PROCEDURE — 99204 OFFICE O/P NEW MOD 45 MIN: CPT | Performed by: OTOLARYNGOLOGY

## 2022-03-30 PROCEDURE — 1036F TOBACCO NON-USER: CPT | Performed by: OTOLARYNGOLOGY

## 2022-03-30 RX ORDER — GLIMEPIRIDE 2 MG/1
TABLET ORAL
COMMUNITY
Start: 2022-02-25

## 2022-03-30 RX ORDER — CIPROFLOXACIN AND DEXAMETHASONE 3; 1 MG/ML; MG/ML
4 SUSPENSION/ DROPS AURICULAR (OTIC) 2 TIMES DAILY
Qty: 1 EACH | Refills: 0 | Status: SHIPPED | OUTPATIENT
Start: 2022-03-30 | End: 2022-04-09

## 2022-03-30 RX ORDER — BENAZEPRIL HYDROCHLORIDE 5 MG/1
TABLET, FILM COATED ORAL
COMMUNITY
Start: 2022-02-24

## 2022-03-30 RX ORDER — ASPIRIN 81 MG/1
81 TABLET ORAL DAILY
COMMUNITY

## 2022-03-30 RX ORDER — ATORVASTATIN CALCIUM 80 MG/1
TABLET, FILM COATED ORAL
COMMUNITY
Start: 2022-01-12

## 2022-03-30 RX ORDER — INSULIN ASPART 100 [IU]/ML
INJECTION, SOLUTION INTRAVENOUS; SUBCUTANEOUS
COMMUNITY
Start: 2022-02-24

## 2022-03-30 ASSESSMENT — ENCOUNTER SYMPTOMS
GASTROINTESTINAL NEGATIVE: 1
RESPIRATORY NEGATIVE: 1
ALLERGIC/IMMUNOLOGIC NEGATIVE: 1
EYES NEGATIVE: 1

## 2022-03-30 NOTE — PROGRESS NOTES
3/30/2022    Marian Choudhury (:  1945) is a 77 y.o. male, Established patient, here for evaluation of the following chief complaint(s):  New Patient (OM R ear )      Vitals:    22 0837   BP: 134/80   Weight: 230 lb (104.3 kg)   Height: 5' 8\" (1.727 m)       Wt Readings from Last 3 Encounters:   22 230 lb (104.3 kg)   17 221 lb (100.2 kg)   17 227 lb (103 kg)       BP Readings from Last 3 Encounters:   22 134/80   17 110/60   17 120/79         SUBJECTIVE/OBJECTIVE:    New patient seen today for his right ear. Patient states last year he developed pressure on the right side. Occasional pain but mainly just pressure. Several weeks ago he had significant pain and was seen in urgent care. They diagnosed a TM perforation. This he had a hole in his eardrum that looks like it was made by something. The patient states his hearing is still down. He does report some new hearing aids last year but does not wear them because the infection on the right. Review of Systems   Constitutional: Negative. HENT: Positive for ear pain and hearing loss. Eyes: Negative. Respiratory: Negative. Cardiovascular: Negative. Gastrointestinal: Negative. Endocrine: Negative. Musculoskeletal: Negative. Skin: Negative. Allergic/Immunologic: Negative. Neurological: Negative. Hematological: Negative. Psychiatric/Behavioral: Negative. Physical Exam  Constitutional:       Appearance: Normal appearance. He is normal weight. HENT:      Head: Normocephalic and atraumatic. Right Ear: Swelling present. A foreign body is present. Left Ear: Tympanic membrane, ear canal and external ear normal.      Nose: Nose normal.      Mouth/Throat:      Mouth: Mucous membranes are moist.      Pharynx: Oropharynx is clear. Eyes:      Extraocular Movements: Extraocular movements intact. Pupils: Pupils are equal, round, and reactive to light. Cardiovascular:      Rate and Rhythm: Normal rate and regular rhythm. Pulmonary:      Effort: Pulmonary effort is normal.      Breath sounds: Normal breath sounds. Musculoskeletal:      Cervical back: Normal range of motion. Skin:     General: Skin is warm and dry. Neurological:      General: No focal deficit present. Mental Status: He is alert and oriented to person, place, and time. Psychiatric:         Mood and Affect: Mood normal.         Behavior: Behavior normal.        Procedure Note:    Otomicroscopy with removal of Foreign Body [id number]    An operating microscope was utilized to visualize the right external ear canal.  Hearing aid dome was noted to be deep in the canal.  This was removed with an alligator. Significant edema and erythema of the canal    ASSESSMENT/PLAN:    1. Foreign body of right ear, initial encounter  2. Otitis externa in other diseases classified elsewhere, right ear  3. Sensorineural hearing loss (SNHL) of both ears  Hearing aid dome removed from right ear. TM appears to be intact. Drops for the next 10 days and follow-up in 2 weeks. I expect his hearing to normalize on that side. Return in about 2 weeks (around 4/13/2022) for ear infection. An electronic signature was used to authenticate this note. Raj Hernandez MD       Please note that this chart was generated using dragon dictation software. Although every effort was made to ensure the accuracy of this automated transcription, some errors in transcription may have occurred.

## 2022-04-13 ENCOUNTER — OFFICE VISIT (OUTPATIENT)
Dept: ENT CLINIC | Age: 77
End: 2022-04-13
Payer: MEDICARE

## 2022-04-13 VITALS
BODY MASS INDEX: 34.86 KG/M2 | DIASTOLIC BLOOD PRESSURE: 76 MMHG | HEIGHT: 68 IN | WEIGHT: 230 LBS | SYSTOLIC BLOOD PRESSURE: 136 MMHG

## 2022-04-13 DIAGNOSIS — H90.3 SENSORINEURAL HEARING LOSS (SNHL) OF BOTH EARS: Primary | ICD-10-CM

## 2022-04-13 PROCEDURE — 1123F ACP DISCUSS/DSCN MKR DOCD: CPT | Performed by: OTOLARYNGOLOGY

## 2022-04-13 PROCEDURE — G8417 CALC BMI ABV UP PARAM F/U: HCPCS | Performed by: OTOLARYNGOLOGY

## 2022-04-13 PROCEDURE — 99212 OFFICE O/P EST SF 10 MIN: CPT | Performed by: OTOLARYNGOLOGY

## 2022-04-13 PROCEDURE — G8427 DOCREV CUR MEDS BY ELIG CLIN: HCPCS | Performed by: OTOLARYNGOLOGY

## 2022-04-13 PROCEDURE — 3017F COLORECTAL CA SCREEN DOC REV: CPT | Performed by: OTOLARYNGOLOGY

## 2022-04-13 PROCEDURE — 4040F PNEUMOC VAC/ADMIN/RCVD: CPT | Performed by: OTOLARYNGOLOGY

## 2022-04-13 PROCEDURE — 1036F TOBACCO NON-USER: CPT | Performed by: OTOLARYNGOLOGY

## 2022-04-13 ASSESSMENT — ENCOUNTER SYMPTOMS
GASTROINTESTINAL NEGATIVE: 1
EYES NEGATIVE: 1
ALLERGIC/IMMUNOLOGIC NEGATIVE: 1
RESPIRATORY NEGATIVE: 1

## 2022-04-13 NOTE — PROGRESS NOTES
2022    Demarco Rodriguez (:  1945) is a 77 y.o. male, Established patient, here for evaluation of the following chief complaint(s):  Follow-up (pt reports improved ears)      Vitals:    22 1117   BP: 136/76   Weight: 230 lb (104.3 kg)   Height: 5' 8\" (1.727 m)       Wt Readings from Last 3 Encounters:   22 230 lb (104.3 kg)   22 230 lb (104.3 kg)   17 221 lb (100.2 kg)       BP Readings from Last 3 Encounters:   22 136/76   22 134/80   17 110/60         SUBJECTIVE/OBJECTIVE:    Patient seen today after I removed and hearing aid dome from his right ear. He was drops for 5 days and ear feels fine. He started using his hearing aids yesterday and doing well. Review of Systems   Constitutional: Negative. HENT: Positive for hearing loss. Eyes: Negative. Respiratory: Negative. Cardiovascular: Negative. Gastrointestinal: Negative. Endocrine: Negative. Musculoskeletal: Negative. Skin: Negative. Allergic/Immunologic: Negative. Neurological: Negative. Hematological: Negative. Psychiatric/Behavioral: Negative. Physical Exam  Constitutional:       Appearance: Normal appearance. He is normal weight. HENT:      Head: Normocephalic and atraumatic. Right Ear: Tympanic membrane, ear canal and external ear normal.      Left Ear: Tympanic membrane, ear canal and external ear normal.      Nose: Nose normal.      Mouth/Throat:      Mouth: Mucous membranes are moist.      Pharynx: Oropharynx is clear. Eyes:      Extraocular Movements: Extraocular movements intact. Pupils: Pupils are equal, round, and reactive to light. Cardiovascular:      Rate and Rhythm: Normal rate and regular rhythm. Pulmonary:      Effort: Pulmonary effort is normal.      Breath sounds: Normal breath sounds. Musculoskeletal:      Cervical back: Normal range of motion. Skin:     General: Skin is warm and dry.    Neurological:      General: No focal deficit present. Mental Status: He is alert and oriented to person, place, and time. Psychiatric:         Mood and Affect: Mood normal.         Behavior: Behavior normal.              ASSESSMENT/PLAN:    1. Sensorineural hearing loss (SNHL) of both ears  Continue hearing aids and make sure to pull them out slowly as to not lose the dome in the ear canal again. Follow-up as needed    Return if symptoms worsen or fail to improve. An electronic signature was used to authenticate this note. Corby Camejo MD       Please note that this chart was generated using dragon dictation software. Although every effort was made to ensure the accuracy of this automated transcription, some errors in transcription may have occurred.

## 2022-04-25 DIAGNOSIS — I27.82 CHRONIC PULMONARY EMBOLISM WITHOUT ACUTE COR PULMONALE, UNSPECIFIED PULMONARY EMBOLISM TYPE: ICD-10-CM

## 2022-04-25 NOTE — TELEPHONE ENCOUNTER
Rx Refill Note  Requested Prescriptions     Pending Prescriptions Disp Refills   • Eliquis 5 MG tablet tablet [Pharmacy Med Name: ELIQUIS 5 MG TABLET] 60 tablet 2     Sig: TAKE 1 TABLET BY MOUTH EVERY 12 HOURS      Last office visit with prescribing clinician: 1/4/2022      Next office visit with prescribing clinician: 5/3/2022    Last refill: 1/4/2022    Kay Quinones MA  04/25/22, 14:42 CDT

## 2022-04-26 RX ORDER — APIXABAN 5 MG/1
TABLET, FILM COATED ORAL
Qty: 60 TABLET | Refills: 2 | Status: SHIPPED | OUTPATIENT
Start: 2022-04-26

## 2022-05-03 ENCOUNTER — OFFICE VISIT (OUTPATIENT)
Dept: FAMILY MEDICINE CLINIC | Facility: CLINIC | Age: 77
End: 2022-05-03

## 2022-05-03 VITALS
HEIGHT: 68 IN | RESPIRATION RATE: 16 BRPM | HEART RATE: 56 BPM | WEIGHT: 227 LBS | SYSTOLIC BLOOD PRESSURE: 130 MMHG | OXYGEN SATURATION: 98 % | TEMPERATURE: 98 F | BODY MASS INDEX: 34.4 KG/M2 | DIASTOLIC BLOOD PRESSURE: 76 MMHG

## 2022-05-03 DIAGNOSIS — I10 ESSENTIAL HYPERTENSION: ICD-10-CM

## 2022-05-03 DIAGNOSIS — N18.31 STAGE 3A CHRONIC KIDNEY DISEASE: ICD-10-CM

## 2022-05-03 DIAGNOSIS — E66.09 CLASS 1 OBESITY DUE TO EXCESS CALORIES WITH SERIOUS COMORBIDITY AND BODY MASS INDEX (BMI) OF 34.0 TO 34.9 IN ADULT: ICD-10-CM

## 2022-05-03 DIAGNOSIS — Z79.4 TYPE 2 DIABETES MELLITUS WITH HYPERGLYCEMIA, WITH LONG-TERM CURRENT USE OF INSULIN: Primary | ICD-10-CM

## 2022-05-03 DIAGNOSIS — E11.65 TYPE 2 DIABETES MELLITUS WITH HYPERGLYCEMIA, WITH LONG-TERM CURRENT USE OF INSULIN: Primary | ICD-10-CM

## 2022-05-03 DIAGNOSIS — E11.65 TYPE 2 DIABETES MELLITUS WITH HYPERGLYCEMIA, WITHOUT LONG-TERM CURRENT USE OF INSULIN: ICD-10-CM

## 2022-05-03 PROCEDURE — 99214 OFFICE O/P EST MOD 30 MIN: CPT | Performed by: FAMILY MEDICINE

## 2022-05-03 RX ORDER — BENAZEPRIL HYDROCHLORIDE 5 MG/1
5 TABLET, FILM COATED ORAL DAILY
Qty: 90 TABLET | Refills: 3 | Status: SHIPPED | OUTPATIENT
Start: 2022-05-03

## 2022-05-03 RX ORDER — INSULIN GLARGINE 100 [IU]/ML
30 INJECTION, SOLUTION SUBCUTANEOUS NIGHTLY
Qty: 10 PEN | Refills: 3 | Status: SHIPPED | OUTPATIENT
Start: 2022-05-03

## 2022-05-03 RX ORDER — INSULIN ASPART 100 [IU]/ML
15 INJECTION, SOLUTION INTRAVENOUS; SUBCUTANEOUS
Qty: 5 PEN | Refills: 5 | Status: SHIPPED | OUTPATIENT
Start: 2022-05-03

## 2022-05-03 NOTE — PROGRESS NOTES
"Subjective cc: DM  Cordell Carreon is a 76 y.o. male.     History of Present Illness   Hypertension  The patient's blood pressure is 130/76 mmHg. His heart rate is 56 bpm. He does not have a fever and his oxygen level is optimum. His weight has remained consistent, at 227 pounds, which is 5 pounds less than last fall 2021.     Diabetes   In 01/2022, his A1c was checked and at that point was 8.7 percent, which was improved from the previous check at 9.2 percent. The patient states that when he checks his blood sugar, his readings are in the low 200's mg/dL. He denies feeling any symptoms. He continues to take metformin twice daily, Basaglar 30 units which is taken nightly insulin, and Novolog 10 units at mealtimes insulin, and glimepiride which is taken each morning. He denies experiencing low blood sugars. His short-acting insulin is taken twice, as his meals are varying. He often does not take breakfast, so he does not use short-acting insulin 3 times a day.     Hyperlipidemia   The patient's lipids were checked in 09/2021. His levels were elevated, however, he continues to take a high-dose statin medication.     He denies any change to his bowel habits, or any abdominal pain.     A review of systems was performed, and pertinent findings are noted in the HPI.    The following portions of the patient's history were reviewed and updated as appropriate: allergies, current medications, past family history, past medical history, past social history, past surgical history and problem list.        Review of Systems    Objective   Blood pressure 130/76, pulse 56, temperature 98 °F (36.7 °C), temperature source Infrared, resp. rate 16, height 172.7 cm (68\"), weight 103 kg (227 lb), SpO2 98 %.  Physical Exam  Vitals and nursing note reviewed.   Constitutional:       General: He is not in acute distress.     Appearance: He is well-developed. He is obese. He is not diaphoretic.   HENT:      Head: Normocephalic and atraumatic. "      Right Ear: External ear normal.      Left Ear: External ear normal.      Nose: Nose normal.   Eyes:      General:         Right eye: No discharge.         Left eye: No discharge.      Conjunctiva/sclera: Conjunctivae normal.   Neck:      Thyroid: No thyromegaly.      Trachea: No tracheal deviation.   Cardiovascular:      Rate and Rhythm: Normal rate and regular rhythm.      Heart sounds: Normal heart sounds.   Pulmonary:      Effort: Pulmonary effort is normal. No respiratory distress.      Breath sounds: Normal breath sounds. No stridor. No wheezing.   Chest:      Chest wall: No tenderness.   Abdominal:      General: There is no distension.      Palpations: Abdomen is soft.      Tenderness: There is no abdominal tenderness.   Musculoskeletal:         General: Normal range of motion.      Cervical back: Normal range of motion.      Right lower leg: No edema.      Left lower leg: No edema.   Lymphadenopathy:      Cervical: No cervical adenopathy.   Skin:     General: Skin is warm and dry.   Neurological:      Mental Status: He is alert and oriented to person, place, and time.      Motor: No abnormal muscle tone.      Coordination: Coordination normal.   Psychiatric:         Behavior: Behavior normal.         Thought Content: Thought content normal.         Judgment: Judgment normal.         Assessment/Plan   Problems Addressed this Visit        Endocrine and Metabolic    Type 2 diabetes mellitus with hyperglycemia, with long-term current use of insulin ([facility]) - Primary    Relevant Medications    insulin aspart (NovoLOG FlexPen) 100 UNIT/ML solution pen-injector sc pen    Insulin Glargine (BASAGLAR KWIKPEN) 100 UNIT/ML injection pen    Other Relevant Orders    Hemoglobin A1c      Other Visit Diagnoses     Essential hypertension        Relevant Medications    benazepril (LOTENSIN) 5 MG tablet    Stage 3a chronic kidney disease (HCC)        Relevant Orders    Comprehensive Metabolic Panel    Type 2 diabetes mellitus  with hyperglycemia, without long-term current use of insulin (HCC)        Relevant Medications    insulin aspart (NovoLOG FlexPen) 100 UNIT/ML solution pen-injector sc pen    Insulin Glargine (BASAGLAR KWIKPEN) 100 UNIT/ML injection pen    Class 1 obesity due to excess calories with serious comorbidity and body mass index (BMI) of 34.0 to 34.9 in adult          Diagnoses       Codes Comments    Type 2 diabetes mellitus with hyperglycemia, with long-term current use of insulin (HCC)    -  Primary ICD-10-CM: E11.65, Z79.4  ICD-9-CM: 250.00, 790.29, V58.67     Essential hypertension     ICD-10-CM: I10  ICD-9-CM: 401.9     Stage 3a chronic kidney disease (HCC)     ICD-10-CM: N18.31  ICD-9-CM: 585.3     Type 2 diabetes mellitus with hyperglycemia, without long-term current use of insulin (HCC)     ICD-10-CM: E11.65  ICD-9-CM: 250.00, 790.29     Class 1 obesity due to excess calories with serious comorbidity and body mass index (BMI) of 34.0 to 34.9 in adult     ICD-10-CM: E66.09, Z68.34  ICD-9-CM: 278.00, V85.34         1. Diabetes mellitus: Chronic, uncontrolled with hyperglycemia.   · Will increase dose of NovoLog from 10 units to 15 units with meal.   · Continue on Basaglar.   · Will check hemoglobin A1c today. Recheck in 3 months.     2. Hypertension: Chronic, controlled.   · Continue on current medication.     3. CKD: Chronic, stable.   · Will repeat CMP for reevaluation.   · Avoid nephrotoxic medication.     4. Obesity:    · BMI is >= 30 and <= 34.9 (Class 1 obesity). The following options were offered after discussion: exercise counseling/recommendations and nutrition counseling/recommendations        Transcribed from ambient dictation for Plalavi Mtz MD by Moriah Espinosa  05/03/22   15:27 CDT    Patient verbalized consent to the visit recording.          This document has been electronically signed by Pallavi Mtz MD on May 4, 2022 09:47 CDT

## 2022-05-04 LAB
ALBUMIN SERPL-MCNC: 4.3 G/DL (ref 3.5–5.2)
ALBUMIN/GLOB SERPL: 1.5 G/DL
ALP SERPL-CCNC: 90 U/L (ref 39–117)
ALT SERPL-CCNC: 28 U/L (ref 1–41)
AST SERPL-CCNC: 21 U/L (ref 1–40)
BILIRUB SERPL-MCNC: 0.5 MG/DL (ref 0–1.2)
BUN SERPL-MCNC: 16 MG/DL (ref 8–23)
BUN/CREAT SERPL: 11.9 (ref 7–25)
CALCIUM SERPL-MCNC: 9.3 MG/DL (ref 8.6–10.5)
CHLORIDE SERPL-SCNC: 105 MMOL/L (ref 98–107)
CO2 SERPL-SCNC: 21.1 MMOL/L (ref 22–29)
CREAT SERPL-MCNC: 1.34 MG/DL (ref 0.76–1.27)
EGFRCR SERPLBLD CKD-EPI 2021: 55.2 ML/MIN/1.73
GLOBULIN SER CALC-MCNC: 2.8 GM/DL
GLUCOSE SERPL-MCNC: 281 MG/DL (ref 65–99)
HBA1C MFR BLD: 9.7 % (ref 4.8–5.6)
POTASSIUM SERPL-SCNC: 5.3 MMOL/L (ref 3.5–5.2)
PROT SERPL-MCNC: 7.1 G/DL (ref 6–8.5)
SODIUM SERPL-SCNC: 142 MMOL/L (ref 136–145)

## 2022-06-30 ENCOUNTER — TELEPHONE (OUTPATIENT)
Dept: URGENT CARE | Facility: CLINIC | Age: 77
End: 2022-06-30

## 2022-06-30 ENCOUNTER — LAB (OUTPATIENT)
Dept: LAB | Facility: HOSPITAL | Age: 77
End: 2022-06-30

## 2022-06-30 DIAGNOSIS — Z20.822 ENCOUNTER FOR LABORATORY TESTING FOR COVID-19 VIRUS: ICD-10-CM

## 2022-06-30 DIAGNOSIS — Z20.822 ENCOUNTER FOR LABORATORY TESTING FOR COVID-19 VIRUS: Primary | ICD-10-CM

## 2022-06-30 LAB — SARS-COV-2 RNA PNL SPEC NAA+PROBE: NOT DETECTED

## 2022-06-30 PROCEDURE — 87635 SARS-COV-2 COVID-19 AMP PRB: CPT

## 2022-06-30 PROCEDURE — C9803 HOPD COVID-19 SPEC COLLECT: HCPCS

## 2022-08-08 ENCOUNTER — OFFICE VISIT (OUTPATIENT)
Dept: FAMILY MEDICINE CLINIC | Facility: CLINIC | Age: 77
End: 2022-08-08

## 2022-08-08 VITALS
OXYGEN SATURATION: 98 % | WEIGHT: 229.6 LBS | SYSTOLIC BLOOD PRESSURE: 127 MMHG | DIASTOLIC BLOOD PRESSURE: 81 MMHG | TEMPERATURE: 98.2 F | HEART RATE: 53 BPM | BODY MASS INDEX: 34.8 KG/M2 | HEIGHT: 68 IN | RESPIRATION RATE: 20 BRPM

## 2022-08-08 DIAGNOSIS — E78.2 MIXED HYPERLIPIDEMIA: ICD-10-CM

## 2022-08-08 DIAGNOSIS — E66.09 CLASS 1 OBESITY DUE TO EXCESS CALORIES WITH SERIOUS COMORBIDITY AND BODY MASS INDEX (BMI) OF 34.0 TO 34.9 IN ADULT: ICD-10-CM

## 2022-08-08 DIAGNOSIS — E11.65 TYPE 2 DIABETES MELLITUS WITH HYPERGLYCEMIA, WITH LONG-TERM CURRENT USE OF INSULIN: Primary | ICD-10-CM

## 2022-08-08 DIAGNOSIS — E11.65 TYPE 2 DIABETES MELLITUS WITH HYPERGLYCEMIA, WITHOUT LONG-TERM CURRENT USE OF INSULIN: ICD-10-CM

## 2022-08-08 DIAGNOSIS — Z79.4 TYPE 2 DIABETES MELLITUS WITH HYPERGLYCEMIA, WITH LONG-TERM CURRENT USE OF INSULIN: Primary | ICD-10-CM

## 2022-08-08 DIAGNOSIS — I10 BENIGN ESSENTIAL HTN: ICD-10-CM

## 2022-08-08 DIAGNOSIS — I10 ESSENTIAL HYPERTENSION: ICD-10-CM

## 2022-08-08 PROCEDURE — 99214 OFFICE O/P EST MOD 30 MIN: CPT | Performed by: FAMILY MEDICINE

## 2022-08-08 RX ORDER — ATORVASTATIN CALCIUM 80 MG/1
80 TABLET, FILM COATED ORAL DAILY
Qty: 90 TABLET | Refills: 3 | Status: SHIPPED | OUTPATIENT
Start: 2022-08-08

## 2022-08-08 RX ORDER — AMLODIPINE BESYLATE AND BENAZEPRIL HYDROCHLORIDE 10; 20 MG/1; MG/1
1 CAPSULE ORAL DAILY
Qty: 90 CAPSULE | Refills: 3 | Status: SHIPPED | OUTPATIENT
Start: 2022-08-08

## 2022-08-08 RX ORDER — PEN NEEDLE, DIABETIC 32GX 5/32"
1 NEEDLE, DISPOSABLE MISCELLANEOUS 4 TIMES DAILY
Qty: 100 EACH | Refills: 3 | Status: SHIPPED | OUTPATIENT
Start: 2022-08-08

## 2022-08-08 RX ORDER — GLIMEPIRIDE 2 MG/1
2 TABLET ORAL
Qty: 90 TABLET | Refills: 3 | Status: SHIPPED | OUTPATIENT
Start: 2022-08-08

## 2022-08-08 RX ORDER — METOPROLOL TARTRATE 50 MG/1
50 TABLET, FILM COATED ORAL EVERY 12 HOURS
Qty: 180 TABLET | Refills: 3 | Status: SHIPPED | OUTPATIENT
Start: 2022-08-08

## 2022-08-08 NOTE — PROGRESS NOTES
"Subjective cc: DM   Cordell Carreon is a 76 y.o. male.     History of Present Illness     The patient presents today for a follow-up visit. He denies any concerns today. His blood pressure today is 127/81 mmHg. He is currently taking amlodipine, benazepril, and metoprolol. The patient's weight has increased approximately 2 pounds since his last visit. He is still taking atorvastatin for his cholesterol.    In regards to his diabetes, his A1c on 05/03/2022 was 9.7 percent. The patient is currently taking glimepiride 2 mg each morning, 30 units at nighttime, and 15 units with each of those meals. He is also taking metformin pill that is 1000 mg 2 times per day. The patient does monitor his blood glucose at home. He reports his blood glucose levels have been in the upper 100s to low 200s. We have tried to get Ozempic and Trulicity, but they would not pay for either of those.    The following portions of the patient's history were reviewed and updated as appropriate: allergies, current medications, past family history, past medical history, past social history, past surgical history and problem list.        Review of Systems    Objective   Blood pressure 127/81, pulse 53, temperature 98.2 °F (36.8 °C), temperature source Infrared, resp. rate 20, height 172.7 cm (68\"), weight 104 kg (229 lb 9.6 oz), SpO2 98 %.  Physical Exam  Vitals and nursing note reviewed.   Constitutional:       General: He is not in acute distress.     Appearance: He is well-developed. He is obese. He is not diaphoretic.   HENT:      Head: Normocephalic and atraumatic.      Right Ear: External ear normal.      Left Ear: External ear normal.      Nose: Nose normal.   Eyes:      General:         Right eye: No discharge.         Left eye: No discharge.      Conjunctiva/sclera: Conjunctivae normal.   Neck:      Thyroid: No thyromegaly.      Trachea: No tracheal deviation.   Cardiovascular:      Rate and Rhythm: Normal rate and regular rhythm.      " Pulses: Normal pulses.      Heart sounds: Normal heart sounds.   Pulmonary:      Effort: Pulmonary effort is normal. No respiratory distress.      Breath sounds: Normal breath sounds. No stridor. No wheezing.   Chest:      Chest wall: No tenderness.   Abdominal:      General: There is no distension.      Palpations: Abdomen is soft.      Tenderness: There is no abdominal tenderness.   Musculoskeletal:      Cervical back: Normal range of motion.      Right lower leg: No edema.      Left lower leg: No edema.   Lymphadenopathy:      Cervical: No cervical adenopathy.   Skin:     General: Skin is warm and dry.   Neurological:      Mental Status: He is alert and oriented to person, place, and time.      Motor: No abnormal muscle tone.      Coordination: Coordination normal.   Psychiatric:         Behavior: Behavior normal.         Thought Content: Thought content normal.         Judgment: Judgment normal.         Assessment & Plan   Problems Addressed this Visit        Cardiac and Vasculature    Benign essential HTN    Relevant Medications    amLODIPine-benazepril (LOTREL) 10-20 MG per capsule    metoprolol tartrate (LOPRESSOR) 50 MG tablet    Other Relevant Orders    Comprehensive Metabolic Panel    Hyperlipidemia    Relevant Medications    atorvastatin (LIPITOR) 80 MG tablet       Endocrine and Metabolic    Type 2 diabetes mellitus with hyperglycemia, with long-term current use of insulin (HCC) - Primary    Relevant Medications    Insulin Pen Needle (BD Pen Needle Eliza 2nd Gen) 32G X 4 MM misc    glimepiride (AMARYL) 2 MG tablet    metFORMIN (GLUCOPHAGE) 1000 MG tablet    Other Relevant Orders    Hemoglobin A1c    Microalbumin / Creatinine Urine Ratio - Urine, Clean Catch      Other Visit Diagnoses     Essential hypertension        Relevant Medications    amLODIPine-benazepril (LOTREL) 10-20 MG per capsule    metoprolol tartrate (LOPRESSOR) 50 MG tablet    Type 2 diabetes mellitus with hyperglycemia, without long-term  current use of insulin (HCC)        Relevant Medications    glimepiride (AMARYL) 2 MG tablet    metFORMIN (GLUCOPHAGE) 1000 MG tablet    Class 1 obesity due to excess calories with serious comorbidity and body mass index (BMI) of 34.0 to 34.9 in adult          Diagnoses       Codes Comments    Type 2 diabetes mellitus with hyperglycemia, with long-term current use of insulin (HCC)    -  Primary ICD-10-CM: E11.65, Z79.4  ICD-9-CM: 250.00, 790.29, V58.67     Benign essential HTN     ICD-10-CM: I10  ICD-9-CM: 401.1     Mixed hyperlipidemia     ICD-10-CM: E78.2  ICD-9-CM: 272.2     Essential hypertension     ICD-10-CM: I10  ICD-9-CM: 401.9     Type 2 diabetes mellitus with hyperglycemia, without long-term current use of insulin (HCC)     ICD-10-CM: E11.65  ICD-9-CM: 250.00, 790.29     Class 1 obesity due to excess calories with serious comorbidity and body mass index (BMI) of 34.0 to 34.9 in adult     ICD-10-CM: E66.09, Z68.34  ICD-9-CM: 278.00, V85.34         1. Hypertension: Chronic, controlled. Continue on current medication.    2. Diabetes mellitus: Chronic, uncontrolled with hyperglycemia. We will recheck hemoglobin A1c today for further evaluation. If blood sugars are uncontrolled, we will consider adding Jardiance to his prescription. We will also get urine sample today to check his microalbuminuria.    3. Hyperlipidemia: Chronic, stable. Continue on statin.    4. Obesity:  Discussed diet and lifestyle changes       Transcribed from ambient dictation for Pallavi Mtz MD by Cyn Rabago.  08/08/22   12:51 CDT    Patient verbalized consent to the visit recording.  I have personally performed the services described in this document as transcribed by the above individual, and it is both accurate and complete.  Pallavi Mtz MD  8/8/2022  14:03 CDT                This document has been electronically signed by Pallavi Mtz MD on August 8, 2022 14:03 CDT

## 2023-03-11 DIAGNOSIS — I10 ESSENTIAL HYPERTENSION: ICD-10-CM

## 2023-03-13 RX ORDER — BENAZEPRIL HYDROCHLORIDE 5 MG/1
TABLET, FILM COATED ORAL
Qty: 90 TABLET | Refills: 3 | OUTPATIENT
Start: 2023-03-13

## 2023-03-13 NOTE — TELEPHONE ENCOUNTER
Refill too soon     Rx Refill Note  Requested Prescriptions     Pending Prescriptions Disp Refills   • benazepril (LOTENSIN) 5 MG tablet [Pharmacy Med Name: BENAZEPRIL HCL 5 MG TABLET] 90 tablet 3     Sig: TAKE 1 TABLET BY MOUTH EVERY DAY      Last office visit with prescribing clinician: 8/8/2022   Next office visit with prescribing clinician: Visit date not found                         Would you like a call back once the refill request has been completed: [] Yes [] No    If the office needs to give you a call back, can they leave a voicemail: [] Yes [] No    Jodie Byrd MA  03/13/23, 09:37 CDT

## 2023-05-27 DIAGNOSIS — E11.65 TYPE 2 DIABETES MELLITUS WITH HYPERGLYCEMIA, WITHOUT LONG-TERM CURRENT USE OF INSULIN: ICD-10-CM

## 2023-05-27 DIAGNOSIS — I10 ESSENTIAL HYPERTENSION: ICD-10-CM

## 2023-05-30 RX ORDER — BENAZEPRIL HYDROCHLORIDE 5 MG/1
TABLET, FILM COATED ORAL
Qty: 90 TABLET | Refills: 3 | Status: SHIPPED | OUTPATIENT
Start: 2023-05-30

## 2023-05-30 RX ORDER — INSULIN GLARGINE 100 [IU]/ML
30 INJECTION, SOLUTION SUBCUTANEOUS NIGHTLY
Qty: 9 ML | Refills: 1 | Status: SHIPPED | OUTPATIENT
Start: 2023-05-30

## 2023-05-30 NOTE — TELEPHONE ENCOUNTER
Rx Refill Note  Requested Prescriptions     Pending Prescriptions Disp Refills    Insulin Glargine (BASAGLAR KWIKPEN) 100 UNIT/ML injection pen [Pharmacy Med Name: BASAGLAR 100 UNIT/ML KWIKPEN]  3     Sig: INJECT 30 UNITS UNDER THE SKIN INTO THE APPROPRIATE AREA AS DIRECTED EVERY NIGHT.    benazepril (LOTENSIN) 5 MG tablet [Pharmacy Med Name: BENAZEPRIL HCL 5 MG TABLET] 90 tablet 3     Sig: TAKE 1 TABLET BY MOUTH EVERY DAY      Last office visit with prescribing clinician: 8/8/2022   Last telemedicine visit with prescribing clinician: Visit date not found   Next office visit with prescribing clinician: 6/23/2023                         Would you like a call back once the refill request has been completed: [] Yes [] No    If the office needs to give you a call back, can they leave a voicemail: [] Yes [] No    Lilli Rose LPN  05/30/23, 11:34 CDT

## 2023-07-25 DIAGNOSIS — E78.2 MIXED HYPERLIPIDEMIA: ICD-10-CM

## 2023-07-25 DIAGNOSIS — I10 ESSENTIAL HYPERTENSION: ICD-10-CM

## 2023-07-25 DIAGNOSIS — E11.65 TYPE 2 DIABETES MELLITUS WITH HYPERGLYCEMIA, WITHOUT LONG-TERM CURRENT USE OF INSULIN: Primary | ICD-10-CM

## 2023-07-25 RX ORDER — AMLODIPINE BESYLATE 10 MG/1
10 TABLET ORAL DAILY
Qty: 30 TABLET | Refills: 2 | Status: SHIPPED | OUTPATIENT
Start: 2023-07-25

## 2023-07-25 RX ORDER — HYDROCHLOROTHIAZIDE 25 MG/1
25 TABLET ORAL DAILY
Qty: 30 TABLET | Refills: 2 | Status: SHIPPED | OUTPATIENT
Start: 2023-07-25

## 2023-07-26 DIAGNOSIS — E78.2 MIXED HYPERLIPIDEMIA: Primary | ICD-10-CM

## 2023-07-26 NOTE — TELEPHONE ENCOUNTER
Fax received from pharmacy- [facility] is no longer covered by pt's insurance. Formulary alternative is praluent 75mg/ml

## 2023-07-28 RX ORDER — ALIROCUMAB 75 MG/ML
75 INJECTION, SOLUTION SUBCUTANEOUS
Qty: 2 ML | Refills: 2 | Status: SHIPPED | OUTPATIENT
Start: 2023-07-28

## 2023-08-01 ENCOUNTER — TELEPHONE (OUTPATIENT)
Dept: FAMILY MEDICINE CLINIC | Facility: CLINIC | Age: 78
End: 2023-08-01
Payer: MEDICARE

## 2023-10-02 DIAGNOSIS — E78.2 MIXED HYPERLIPIDEMIA: ICD-10-CM

## 2023-10-02 DIAGNOSIS — I10 ESSENTIAL HYPERTENSION: ICD-10-CM

## 2023-10-02 DIAGNOSIS — E11.65 TYPE 2 DIABETES MELLITUS WITH HYPERGLYCEMIA, WITHOUT LONG-TERM CURRENT USE OF INSULIN: ICD-10-CM

## 2023-10-02 RX ORDER — METOPROLOL TARTRATE 50 MG/1
TABLET, FILM COATED ORAL
Qty: 180 TABLET | Refills: 0 | Status: SHIPPED | OUTPATIENT
Start: 2023-10-02

## 2023-10-02 RX ORDER — ATORVASTATIN CALCIUM 80 MG/1
TABLET, FILM COATED ORAL
Qty: 90 TABLET | Refills: 0 | Status: SHIPPED | OUTPATIENT
Start: 2023-10-02

## 2023-10-26 DIAGNOSIS — I10 ESSENTIAL HYPERTENSION: ICD-10-CM

## 2023-10-26 RX ORDER — AMLODIPINE BESYLATE 10 MG/1
10 TABLET ORAL DAILY
Qty: 90 TABLET | Refills: 0 | Status: SHIPPED | OUTPATIENT
Start: 2023-10-26

## 2023-10-27 DIAGNOSIS — I10 ESSENTIAL HYPERTENSION: ICD-10-CM

## 2023-10-27 RX ORDER — HYDROCHLOROTHIAZIDE 25 MG/1
25 TABLET ORAL DAILY
Qty: 90 TABLET | Refills: 0 | Status: SHIPPED | OUTPATIENT
Start: 2023-10-27

## 2023-10-27 NOTE — TELEPHONE ENCOUNTER
Rx Refill Note  Requested Prescriptions     Pending Prescriptions Disp Refills    hydroCHLOROthiazide (HYDRODIURIL) 25 MG tablet [Pharmacy Med Name: HYDROCHLOROTHIAZIDE 25 MG TAB] 90 tablet      Sig: TAKE 1 TABLET BY MOUTH EVERY DAY      Last office visit with prescribing clinician: 6/23/2023         Eleanor Ramirez MA  10/27/23, 09:36 CDT

## 2023-12-29 DIAGNOSIS — I10 ESSENTIAL HYPERTENSION: ICD-10-CM

## 2023-12-29 DIAGNOSIS — E78.2 MIXED HYPERLIPIDEMIA: ICD-10-CM

## 2023-12-29 RX ORDER — METOPROLOL TARTRATE 50 MG/1
TABLET, FILM COATED ORAL
Qty: 180 TABLET | Refills: 0 | Status: SHIPPED | OUTPATIENT
Start: 2023-12-29

## 2023-12-29 RX ORDER — ATORVASTATIN CALCIUM 80 MG/1
TABLET, FILM COATED ORAL
Qty: 90 TABLET | Refills: 0 | Status: SHIPPED | OUTPATIENT
Start: 2023-12-29

## 2024-01-25 DIAGNOSIS — E11.65 TYPE 2 DIABETES MELLITUS WITH HYPERGLYCEMIA, WITHOUT LONG-TERM CURRENT USE OF INSULIN: ICD-10-CM

## 2024-01-25 DIAGNOSIS — I10 ESSENTIAL HYPERTENSION: ICD-10-CM

## 2024-01-26 RX ORDER — AMLODIPINE BESYLATE 10 MG/1
10 TABLET ORAL DAILY
Qty: 90 TABLET | Refills: 0 | Status: SHIPPED | OUTPATIENT
Start: 2024-01-26

## 2024-01-26 NOTE — TELEPHONE ENCOUNTER
Rx Refill Note  Requested Prescriptions     Pending Prescriptions Disp Refills    metFORMIN (GLUCOPHAGE) 1000 MG tablet [Pharmacy Med Name: METFORMIN HCL 1,000 MG TABLET] 180 tablet 0     Sig: TAKE 1 TABLET BY MOUTH TWICE A DAY WITH FOOD    amLODIPine (NORVASC) 10 MG tablet [Pharmacy Med Name: AMLODIPINE BESYLATE 10 MG TAB] 90 tablet 0     Sig: TAKE 1 TABLET BY MOUTH EVERY DAY      Last office visit with prescribing clinician: 6/23/2023   Last telemedicine visit with prescribing clinician: Visit date not found   Next office visit with prescribing clinician: Visit date not found                         Would you like a call back once the refill request has been completed: [] Yes [] No    If the office needs to give you a call back, can they leave a voicemail: [] Yes [] No    Radha Jason MA  01/26/24, 09:07 CST

## 2024-03-25 DIAGNOSIS — I10 ESSENTIAL HYPERTENSION: ICD-10-CM

## 2024-03-25 DIAGNOSIS — E78.2 MIXED HYPERLIPIDEMIA: ICD-10-CM

## 2024-03-25 RX ORDER — ATORVASTATIN CALCIUM 80 MG/1
TABLET, FILM COATED ORAL
Qty: 90 TABLET | Refills: 0 | Status: SHIPPED | OUTPATIENT
Start: 2024-03-25

## 2024-03-25 RX ORDER — METOPROLOL TARTRATE 50 MG/1
TABLET, FILM COATED ORAL
Qty: 180 TABLET | Refills: 0 | Status: SHIPPED | OUTPATIENT
Start: 2024-03-25

## 2024-03-25 NOTE — TELEPHONE ENCOUNTER
Rx Refill Note  Requested Prescriptions     Pending Prescriptions Disp Refills    atorvastatin (LIPITOR) 80 MG tablet [Pharmacy Med Name: ATORVASTATIN 80 MG TABLET] 90 tablet 0     Sig: TAKE 1 TABLET BY MOUTH EVERY DAY    metoprolol tartrate (LOPRESSOR) 50 MG tablet [Pharmacy Med Name: METOPROLOL TARTRATE 50 MG TAB] 180 tablet 0     Sig: TAKE 1 TABLET BY MOUTH EVERY 12 HOURS      Last office visit with prescribing clinician: 6/23/2023   Last telemedicine visit with prescribing clinician: Visit date not found   Next office visit with prescribing clinician: Visit date not found                         Would you like a call back once the refill request has been completed: [] Yes [] No    If the office needs to give you a call back, can they leave a voicemail: [] Yes [] No    Radha Jason MA  03/25/24, 08:01 CDT

## 2024-04-11 DIAGNOSIS — E11.65 TYPE 2 DIABETES MELLITUS WITH HYPERGLYCEMIA, WITHOUT LONG-TERM CURRENT USE OF INSULIN: ICD-10-CM

## 2024-04-11 NOTE — TELEPHONE ENCOUNTER
Rx Refill Note  Requested Prescriptions     Pending Prescriptions Disp Refills    metFORMIN (GLUCOPHAGE) 1000 MG tablet [Pharmacy Med Name: METFORMIN HCL 1,000 MG TABLET] 180 tablet 0     Sig: TAKE 1 TABLET BY MOUTH TWICE A DAY WITH FOOD      Last office visit with prescribing clinician: 6/23/2023   Last telemedicine visit with prescribing clinician: Visit date not found   Next office visit with prescribing clinician: Visit date not found                         Would you like a call back once the refill request has been completed: [] Yes [] No    If the office needs to give you a call back, can they leave a voicemail: [] Yes [] No    Radha Jason MA  04/11/24, 10:35 CDT

## 2024-04-22 DIAGNOSIS — I10 ESSENTIAL HYPERTENSION: ICD-10-CM

## 2024-04-22 NOTE — TELEPHONE ENCOUNTER
Rx Refill Note  Requested Prescriptions     Pending Prescriptions Disp Refills    amLODIPine (NORVASC) 10 MG tablet [Pharmacy Med Name: AMLODIPINE BESYLATE 10 MG TAB] 90 tablet 0     Sig: TAKE 1 TABLET BY MOUTH EVERY DAY      Last office visit with prescribing clinician: 6/23/2023   Last telemedicine visit with prescribing clinician: Visit date not found   Next office visit with prescribing clinician: Visit date not found                         Would you like a call back once the refill request has been completed: [] Yes [] No    If the office needs to give you a call back, can they leave a voicemail: [] Yes [] No    Radha Jason MA  04/22/24, 08:35 CDT

## 2024-04-23 RX ORDER — AMLODIPINE BESYLATE 10 MG/1
10 TABLET ORAL DAILY
Qty: 90 TABLET | Refills: 0 | Status: SHIPPED | OUTPATIENT
Start: 2024-04-23

## 2024-07-06 DIAGNOSIS — E11.65 TYPE 2 DIABETES MELLITUS WITH HYPERGLYCEMIA, WITHOUT LONG-TERM CURRENT USE OF INSULIN: ICD-10-CM

## 2024-07-07 DIAGNOSIS — E78.2 MIXED HYPERLIPIDEMIA: ICD-10-CM

## 2024-07-12 RX ORDER — ATORVASTATIN CALCIUM 80 MG/1
TABLET, FILM COATED ORAL
Qty: 90 TABLET | Refills: 0 | OUTPATIENT
Start: 2024-07-12

## 2024-12-23 ENCOUNTER — TELEPHONE (OUTPATIENT)
Dept: GASTROENTEROLOGY | Age: 79
End: 2024-12-23

## 2024-12-23 NOTE — TELEPHONE ENCOUNTER
Hunter is returning a call to schedule his colon screening. Please contact patient to advise.     Thank you.

## 2025-03-06 ENCOUNTER — ANESTHESIA (OUTPATIENT)
Dept: OPERATING ROOM | Age: 80
End: 2025-03-06

## 2025-03-06 ENCOUNTER — APPOINTMENT (OUTPATIENT)
Dept: OPERATING ROOM | Age: 80
End: 2025-03-06
Attending: INTERNAL MEDICINE

## 2025-03-06 ENCOUNTER — ANESTHESIA EVENT (OUTPATIENT)
Dept: OPERATING ROOM | Age: 80
End: 2025-03-06

## 2025-03-06 ENCOUNTER — HOSPITAL ENCOUNTER (OUTPATIENT)
Age: 80
Setting detail: OUTPATIENT SURGERY
Discharge: HOME | End: 2025-03-06
Attending: INTERNAL MEDICINE | Admitting: INTERNAL MEDICINE

## 2025-03-06 VITALS
HEIGHT: 68 IN | WEIGHT: 232 LBS | RESPIRATION RATE: 16 BRPM | BODY MASS INDEX: 35.16 KG/M2 | HEART RATE: 66 BPM | SYSTOLIC BLOOD PRESSURE: 140 MMHG | OXYGEN SATURATION: 94 % | TEMPERATURE: 98 F | DIASTOLIC BLOOD PRESSURE: 75 MMHG

## 2025-03-06 PROCEDURE — G8907 PT DOC NO EVENTS ON DISCHARG: HCPCS

## 2025-03-06 PROCEDURE — G8918 PT W/O PREOP ORDER IV AB PRO: HCPCS

## 2025-03-06 PROCEDURE — G0105 COLORECTAL SCRN; HI RISK IND: HCPCS

## 2025-03-06 RX ORDER — PROPOFOL 10 MG/ML
INJECTION, EMULSION INTRAVENOUS
Status: DISCONTINUED | OUTPATIENT
Start: 2025-03-06 | End: 2025-03-06 | Stop reason: SDUPTHER

## 2025-03-06 RX ORDER — INSULIN ASPART INJECTION 100 [IU]/ML
INJECTION, SOLUTION SUBCUTANEOUS
COMMUNITY
Start: 2024-12-10

## 2025-03-06 RX ORDER — EZETIMIBE 10 MG/1
10 TABLET ORAL DAILY
COMMUNITY
Start: 2024-12-04

## 2025-03-06 RX ORDER — SODIUM CHLORIDE, SODIUM LACTATE, POTASSIUM CHLORIDE, CALCIUM CHLORIDE 600; 310; 30; 20 MG/100ML; MG/100ML; MG/100ML; MG/100ML
INJECTION, SOLUTION INTRAVENOUS CONTINUOUS
Status: DISCONTINUED | OUTPATIENT
Start: 2025-03-06 | End: 2025-03-06 | Stop reason: HOSPADM

## 2025-03-06 RX ORDER — LIDOCAINE HYDROCHLORIDE 10 MG/ML
INJECTION, SOLUTION EPIDURAL; INFILTRATION; INTRACAUDAL; PERINEURAL
Status: DISCONTINUED | OUTPATIENT
Start: 2025-03-06 | End: 2025-03-06 | Stop reason: SDUPTHER

## 2025-03-06 RX ADMIN — PROPOFOL 200 MG: 10 INJECTION, EMULSION INTRAVENOUS at 11:08

## 2025-03-06 RX ADMIN — LIDOCAINE HYDROCHLORIDE 50 MG: 10 INJECTION, SOLUTION EPIDURAL; INFILTRATION; INTRACAUDAL; PERINEURAL at 11:08

## 2025-03-06 RX ADMIN — SODIUM CHLORIDE, SODIUM LACTATE, POTASSIUM CHLORIDE, CALCIUM CHLORIDE: 600; 310; 30; 20 INJECTION, SOLUTION INTRAVENOUS at 10:54

## 2025-03-06 ASSESSMENT — PAIN - FUNCTIONAL ASSESSMENT: PAIN_FUNCTIONAL_ASSESSMENT: NONE - DENIES PAIN

## 2025-03-06 NOTE — DISCHARGE INSTRUCTIONS
1. Repeat colonoscopy: no further screening necessary, d/t age and lack of polyps. POST-OP ORDERS: ENDOSCOPY & COLONOSCOPY:    1. Rest today.    2. DO NOT eat or drink until wide awake; eat your usual diet today in moderate amount only.    3. DO NOT drive today.    4. Call physician if you have severe pain, vomiting, fever, rectal bleeding or black bowel movements.    5.  If a biopsy was taken or a polyp removed, you should expect to hear results in about 21 days.  If you have heard nothing from your physician by then, call the office for results.    6.  Discharge home when patient awake, vitals signs stable and tolerating liquids.    7. Call with questions or concerns [phone].

## 2025-03-06 NOTE — ANESTHESIA PRE PROCEDURE
Department of Anesthesiology  Preprocedure Note       Name:  Hunter Mtz   Age:  79 y.o.  :  1945                                          MRN:  [id number]         Date:  3/6/2025      Surgeon: Surgeon(s):  Oli Carter MD    Procedure: Procedure(s):  COLORECTAL CANCER SCREENING, NOT HIGH RISK    Medications prior to admission:   Prior to Admission medications    Medication Sig Start Date End Date Taking? Authorizing Provider   apixaban (ELIQUIS) 5 MG TABS tablet TAKE 1 TABLET BY MOUTH EVERY 12 HOURS 22   Jeannette White MD   aspirin 81 MG EC tablet Take 81 mg by mouth daily    Jeannette White MD   atorvastatin (LIPITOR) 80 MG tablet TAKE 1 TABLET BY MOUTH EVERY DAY 22   Jeannette White MD   benazepril (LOTENSIN) 5 MG tablet TAKE 1 TABLET BY MOUTH EVERY DAY 22   Jeannette White MD   glimepiride (AMARYL) 2 MG tablet TAKE 1 TABLET BY MOUTH EVERY DAY BEFORE BREAKFAST 22   Jeannette White MD   NOVOLOG FLEXPEN 100 UNIT/ML injection pen INJECT 10 UNITS UNDER THE SKIN INTO THE APPROPRIATE AREA AS DIRECTED 3 TIMES A DAY WITH MEALS. 22   Jeannette White MD   insulin glargine (LANTUS;BASAGLAR) 100 UNIT/ML injection pen Inject 30 Units into the skin nightly 21   Jeannette White MD   metFORMIN (GLUCOPHAGE) 1000 MG tablet Take 1,000 mg by mouth 2 times daily (with meals)    Jeannette White MD   amLODIPine-benazepril (LOTREL) 10-20 MG per capsule Take 1 capsule by mouth daily    Jeannette White MD   Atorvastatin Calcium (LIPITOR PO) Take 80 mg by mouth nightly     Jeannette White MD   METOPROLOL TARTRATE Take 50 mg by mouth 2 times daily     Jeannette White MD       Current medications:    No current facility-administered medications for this encounter.       Allergies:  No Known Allergies    Problem List:    Patient Active Problem List   Diagnosis Code   • History of colon polyps Z86.0100       Past Medical History:

## 2025-03-06 NOTE — H&P
Patient Name: Hunter Mtz  : 1945  MRN: [id number]  DATE: 25    Allergies: No Known Allergies     ENDOSCOPY  History and Physical    Procedure:    [] Diagnostic Colonoscopy       [x] Screening Colonoscopy  [] EGD      [] ERCP      [] EUS       [] Other    [x] Previous office notes/History and Physical reviewed from the patients chart. Please see EMR for further details of HPI. I have examined the patient's status immediately prior to the procedure and:      Indications/HPI:       [x] Screening              [x] History of Polyps      []Fhx of colon CA/polyps []+Cologard/DNA/Stool Testing      Anesthesia:   [x] MAC [] Moderate Sedation   [] General   [] None     ROS: 12 pt review of systems was negative unless stated above    Medications:   Prior to Admission medications    Medication Sig Start Date End Date Taking? Authorizing Provider   apixaban (ELIQUIS) 5 MG TABS tablet TAKE 1 TABLET BY MOUTH EVERY 12 HOURS 22   Jeannette White MD   aspirin 81 MG EC tablet Take 81 mg by mouth daily    Jeannette White MD   atorvastatin (LIPITOR) 80 MG tablet TAKE 1 TABLET BY MOUTH EVERY DAY 22   Jeannette White MD   benazepril (LOTENSIN) 5 MG tablet TAKE 1 TABLET BY MOUTH EVERY DAY 22   Jeannette White MD   glimepiride (AMARYL) 2 MG tablet TAKE 1 TABLET BY MOUTH EVERY DAY BEFORE BREAKFAST 22   Jeannette White MD   NOVOLOG FLEXPEN 100 UNIT/ML injection pen INJECT 10 UNITS UNDER THE SKIN INTO THE APPROPRIATE AREA AS DIRECTED 3 TIMES A DAY WITH MEALS. 22   Jeannette White MD   insulin glargine (LANTUS;BASAGLAR) 100 UNIT/ML injection pen Inject 30 Units into the skin nightly 21   Jeannette White MD   metFORMIN (GLUCOPHAGE) 1000 MG tablet Take 1,000 mg by mouth 2 times daily (with meals)    Jeannette White MD   amLODIPine-benazepril (LOTREL) 10-20 MG per capsule Take 1 capsule by mouth daily    Jeannette White MD   Atorvastatin

## 2025-03-06 NOTE — ANESTHESIA POSTPROCEDURE EVALUATION
Department of Anesthesiology  Postprocedure Note    Patient: Hunter Mtz  MRN: [id number]  YOB: 1945  Date of evaluation: 3/6/2025    Procedure Summary       Date: 03/06/25 Room / Location: [place] [address] / [facility] [place]    Anesthesia Start: 1104 Anesthesia Stop:     Procedure: COLORECTAL CANCER SCREENING, NOT HIGH RISK (Abdomen) Diagnosis: Screen for colon cancer    Surgeons: Oli Carter MD Responsible Provider: Lindsay Delgado APRN - CRNA    Anesthesia Type: general, TIVA ASA Status: 2            Anesthesia Type: No value filed.    Darinel Phase I:      Darinel Phase II:      Anesthesia Post Evaluation    Patient location during evaluation: bedside  Patient participation: complete - patient participated  Level of consciousness: sleepy but conscious  Pain score: 0  Airway patency: patent  Nausea & Vomiting: no nausea and no vomiting  Cardiovascular status: blood pressure returned to baseline  Respiratory status: acceptable, room air and spontaneous ventilation  Hydration status: euvolemic  Pain management: adequate    No notable events documented.

## 2025-03-06 NOTE — OP NOTE
Patient: Hunter Mtz : 1945  Med Rec#: [id number] Acc#: [id number]   Primary Care Provider Aretha Dhaliwal MD    Date of Procedure:  3/6/2025    Endoscopist: Oli Carter MD    Referring Provider: Aretha Dhaliwal MD    Operation Performed: Colonoscopy    Indications: Screening- hx of colon polyps    Anesthesia:  Sedation was administered by anesthesia who monitored the patient during the procedure.    I met with Hunter Mtz prior to procedure. We discussed the procedure itself, and I have discussed the risks of endoscopy (including-- but not limited to-- pain, discomfort, bleeding potentially requiring second endoscopic procedure and/or blood transfusion, organ perforation requiring operative repair, damage to organs near the colon, infection, aspiration, cardiopulmonary/allergic reaction), benefits, indications to endoscopy. Additionally, we discussed options other than colonoscopy. The patient expressed understanding. All questions answered. The patient decided to proceed with the procedure.  Signed informed consent was placed on the chart.    Blood Loss: minimal    Withdrawal time: > 6 min  Bowel Prep: adequate     Complications: no immediate complications    DESCRIPTION OF PROCEDURE:     A time out was performed. After written informed consent was obtained, the patient was placed in the left lateral position.     The perianal area was inspected, and a digital rectal exam was performed. A rectal exam was performed: normal tone, no palpable lesions. At this point, a forward viewing Olympus colonoscope was inserted into the anus and carefully advanced to the cecum.  The cecum was identified by the ileocecal valve and the appendiceal orifice. The colonoscope was then slowly withdrawn with careful inspection of the mucosa in a linear and circumferential fashion. The scope was retroflexed in the rectum. Suction was utilized during the procedure to remove as much air as possible from the bowel. The  colonoscope was removed from the patient, and the procedure was terminated.  Findings are listed below.        Findings:     The mucosa appeared normal throughout the entire examined colon.    There was evidence of diverticular disease throughout the left colon.     Retroflexion in the rectum was normal and revealed no further abnormalities.        Recommendations:  1. Repeat colonoscopy: no further screening necessary, d/t age and lack of polyps.       Findings and recommendations were discussed w/ the patient. A copy of the images was provided.    I, Maribel Tavares, am scribing for and in the presence of Dr. Oli Carter MD.  Electronically signed by Maribel Tavares RN on 3/6/2025 at 11:19 AM    I personally performed the services described in this documentation as scribed by Maribel Tavares, and it appears accurate and complete.     Oli Carter MD  3/6/2025

## 2025-05-01 ENCOUNTER — OFFICE VISIT (OUTPATIENT)
Dept: OTOLARYNGOLOGY | Facility: CLINIC | Age: 80
End: 2025-05-01
Payer: MEDICARE

## 2025-05-01 VITALS
WEIGHT: 228.6 LBS | HEIGHT: 68 IN | SYSTOLIC BLOOD PRESSURE: 124 MMHG | BODY MASS INDEX: 34.65 KG/M2 | HEART RATE: 84 BPM | TEMPERATURE: 97 F | DIASTOLIC BLOOD PRESSURE: 72 MMHG | RESPIRATION RATE: 20 BRPM

## 2025-05-01 DIAGNOSIS — H93.A9 PULSATILE TINNITUS: ICD-10-CM

## 2025-05-01 DIAGNOSIS — H90.A32 MIXED CONDUCTIVE AND SENSORINEURAL HEARING LOSS OF LEFT EAR WITH RESTRICTED HEARING OF RIGHT EAR: Primary | ICD-10-CM

## 2025-05-01 DIAGNOSIS — H90.3 SENSORINEURAL HEARING LOSS (SNHL) OF BOTH EARS: ICD-10-CM

## 2025-05-01 RX ORDER — SEMAGLUTIDE 0.68 MG/ML
0.25 INJECTION, SOLUTION SUBCUTANEOUS WEEKLY
COMMUNITY

## 2025-05-01 RX ORDER — EZETIMIBE 10 MG/1
10 TABLET ORAL DAILY
COMMUNITY
Start: 2024-12-04

## 2025-05-01 NOTE — PROGRESS NOTES
AMMY Siu  [facility] EAR NOSE & THROAT  [address], [address]  [place] [address]  Fax [phone]  Phone [phone]      Visit Type: NEW PATIENT   Chief Complaint   Patient presents with    [facility]     NEW PATIENT - Pulsatile tinnitus, right ear  Cannot hearing anything out of right side.           HPI      History of Present Illness  The patient is a 79-year-old male, new patient, who presents with pulsatile tinnitus in the right ear.    He first observed a change in his auditory perception around 2015. Despite attempts to manage the condition with online hearing aids, he found them ineffective. 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 He does not experience any high-pitched or low-pitched tinnitus but frequently perceives his pulse or heartbeat in his right ear. This symptom was first noticed when he lost a component of his initial hearing aid, which resulted in severe ear pain. An ENT specialist subsequently removed the lodged component, after which he began to experience the aforementioned symptom. He continues to perceive this sensation frequently. He reports no history of hypertension and is currently on medication. He also reports no episodes of dizziness or imbalance.    SOCIAL HISTORY  He served in the  as a .    Results  Testing  [facility] sensorineural hearing loss in the left ear that is normal sloping to moderate in the right ear. Significant mixed loss that rises to moderate sensorineural loss. Right understanding scores are 48%. Left understanding scores are 96%.    Past Medical History:   Diagnosis Date    Class 2 severe obesity due to excess calories with serious comorbidity and body mass index (BMI) of 35.0 to 35.9 in adult 10/24/2019    Diabetes mellitus     Hyperlipidemia     Hypertension         Past Surgical History:   Procedure Laterality Date    CARDIAC SURGERY      CATARACT EXTRACTION  7/5/17, 7/28/17    CORONARY ARTERY BYPASS GRAFT  2008       Family History: His family history includes Dementia in his mother; Heart attack in his father and maternal grandfather; No Known Problems in his daughter, maternal grandmother, paternal grandfather, paternal grandmother, and son; Ovarian cancer (age of onset: 62) in his sister.     Social History: He  reports that he has never smoked. He has never used smokeless tobacco. He reports that he does not currently use alcohol. He reports that he does not use drugs.    Home Medications:  Alirocumab, BASAGLAR KWIKPEN, Insulin Pen Needle, Semaglutide(0.25 or 0.5MG/DOS), amLODIPine, aspirin, atorvastatin, benazepril, ezetimibe, glimepiride, hydroCHLOROthiazide, insulin aspart, metFORMIN, and metoprolol tartrate    Allergies:  He has no known allergies.       Vital Signs:   Temp:  [97 °F (36.1 °C)] 97 °F (36.1 °C)  Heart Rate:  [84] 84  Resp:  [20] 20  BP: (124)/(72) 124/72  ENT Physical Exam  Ear  Auricles: right auricle normal; left auricle normal;  External Mastoids: right external mastoid normal; left external mastoid normal;  Ear Canals: right ear canal normal; left ear canal normal;  Tympanic Membranes: right tympanic membrane normal; left tympanic membrane normal;       Physical Exam          Result Review       RESULTS REVIEW    I have reviewed the patients old records in the chart.   The following results/records were reviewed:   REFERRAL/PRE-AUTH MRN - SCAN - EXT MED REC_ [facility] DEPT_ 04-21-25 (04/21/2025) normal sloping to moderate SNHL right type A with 96% word rec, moderate/severe mixed loss rising to moderate SNHL left ype A with 48% word rec  I consulted with Dr. [facility] to interpret [facility]       Assessment & Plan  Mixed conductive and sensorineural hearing loss of left ear with restricted hearing of right ear    Sensorineural  hearing loss (SNHL) of both ears    Pulsatile tinnitus       Assessment & Plan  1. Pulsatile tinnitus.  He reports hearing his pulse in his right ear, which may indicate a vascular issue. A CAT scan has been ordered to rule out any vascular abnormalities. If the CAT scan results are normal, clearance for hearing aids will be provided.    2. Mixed hearing loss, right ear.  He has significant mixed hearing loss in the right ear, with understanding scores at 48%. A BiCROS hearing aid or a CROS hearing aid may be beneficial as it will transmit sound from the right ear to the left ear for better interpretation. Clearance for hearing aids will be provided if the CAT scan results are normal.    Follow-up  The patient will follow up in 6 weeks.     Orders Placed This Encounter   Procedures    CT iac w wo contrast           Return in about 6 weeks (around 6/12/2025) for Follow up with AMMY Siu.        Electronically signed by AMMY Siu 05/01/25 10:31 AM CDT.     Patient or patient representative verbalized consent for the use of Ambient Listening during the visit with  AMMY Siu for chart documentation. 5/1/2025  10:31 CDT

## 2025-06-04 ENCOUNTER — HOSPITAL ENCOUNTER (OUTPATIENT)
Dept: CT IMAGING | Facility: HOSPITAL | Age: 80
Discharge: HOME | End: 2025-06-04
Admitting: EMERGENCY MEDICINE
Payer: MEDICARE

## 2025-06-04 ENCOUNTER — OFFICE VISIT (OUTPATIENT)
Dept: OTOLARYNGOLOGY | Facility: CLINIC | Age: 80
End: 2025-06-04
Payer: MEDICARE

## 2025-06-04 VITALS
DIASTOLIC BLOOD PRESSURE: 57 MMHG | HEIGHT: 68 IN | BODY MASS INDEX: 34.71 KG/M2 | TEMPERATURE: 97 F | HEART RATE: 85 BPM | SYSTOLIC BLOOD PRESSURE: 104 MMHG | WEIGHT: 229 LBS | RESPIRATION RATE: 20 BRPM

## 2025-06-04 DIAGNOSIS — H93.A9 PULSATILE TINNITUS: ICD-10-CM

## 2025-06-04 DIAGNOSIS — H90.3 SENSORINEURAL HEARING LOSS (SNHL) OF BOTH EARS: ICD-10-CM

## 2025-06-04 DIAGNOSIS — H90.A32 MIXED CONDUCTIVE AND SENSORINEURAL HEARING LOSS OF LEFT EAR WITH RESTRICTED HEARING OF RIGHT EAR: ICD-10-CM

## 2025-06-04 DIAGNOSIS — H90.A32 MIXED CONDUCTIVE AND SENSORINEURAL HEARING LOSS OF LEFT EAR WITH RESTRICTED HEARING OF RIGHT EAR: Primary | ICD-10-CM

## 2025-06-04 PROCEDURE — 25510000001 IOPAMIDOL 61 % SOLUTION: Performed by: EMERGENCY MEDICINE

## 2025-06-04 PROCEDURE — 70482 CT ORBIT/EAR/FOSSA W/O&W/DYE: CPT

## 2025-06-04 RX ORDER — IOPAMIDOL 612 MG/ML
100 INJECTION, SOLUTION INTRAVASCULAR
Status: COMPLETED | OUTPATIENT
Start: 2025-06-04 | End: 2025-06-04

## 2025-06-04 RX ADMIN — IOPAMIDOL 100 ML: 612 INJECTION, SOLUTION INTRAVENOUS at 13:44

## 2025-06-04 NOTE — PROGRESS NOTES
AMMY [facility]u [facility] EAR NOSE & THROAT  [address], [address]  [place] [place] [facility] [address]  Fax [phone]  Phone [phone]      Visit Type: FOLLOW UP   Chief Complaint   Patient presents with    Follow-up     CT FOLLOW UP            HISTORY OBTAINED FROM: patient  HPI  He presents for a follow up evaluation. Patient is here for follow up after [facility].      Past Medical History:   Diagnosis Date    Class 2 severe obesity due to excess calories with serious comorbidity and body mass index (BMI) of 35.0 to 35.9 in adult 10/24/2019    Diabetes mellitus     Hyperlipidemia     Hypertension        Past Surgical History:   Procedure Laterality Date    CARDIAC SURGERY      CATARACT EXTRACTION  7/5/17, 7/28/17    CORONARY ARTERY BYPASS GRAFT  2008       Family History: His family history includes Dementia in his mother; Heart attack in his father and maternal grandfather; No Known Problems in his daughter, maternal grandmother, paternal grandfather, paternal grandmother, and son; Ovarian cancer (age of onset: 62) in his sister.     Social History: He  reports that he has never smoked. He has never used smokeless tobacco. He reports that he does not currently use alcohol. He reports that he does not use drugs.    Home Medications:  Alirocumab, BASAGLAR KWIKPEN, Insulin Pen Needle, Semaglutide(0.25 or 0.5MG/DOS), amLODIPine, aspirin, atorvastatin, benazepril, ezetimibe, glimepiride, hydroCHLOROthiazide, insulin aspart, metFORMIN, and metoprolol tartrate    Allergies:  He has no known allergies.       Vital Signs:   Temp:  [97 °F (36.1 °C)] 97 °F (36.1 °C)  Heart Rate:  [85] 85  Resp:  [20] 20  BP: (104)/(57) 104/57  ENT Physical Exam  Ear  Hearing: impaired to conversational voice;  Auricles: right auricle normal; left auricle normal;  External Mastoids: right external mastoid normal; left external mastoid  normal;  Ear Canals: right ear canal normal; left ear canal normal;  Tympanic Membranes: right tympanic membrane normal; left tympanic membrane normal;           Result Review       RESULTS REVIEW    I have reviewed the patients old records in the chart.   The following results/records were reviewed:   CT IAC With & Without Contrast (06/04/2025 13:43) normal         Assessment & Plan  Mixed conductive and sensorineural hearing loss of left ear with restricted hearing of right ear    Sensorineural hearing loss (SNHL) of both ears    Pulsatile tinnitus         Medically cleared for hearing aids  No follow-ups on file.        Electronically signed by AMMY Siu 06/04/25 5:18 PM CDT.

## (undated) DEVICE — SINGLE PORT MANIFOLD: Brand: NEPTUNE 2

## (undated) DEVICE — CANNULA NSL AD L7FT DIV O2 CO2 W/ M LUERLOCK TRMPT CONN

## (undated) DEVICE — ENDO KIT,LOURDES HOSPITAL: Brand: MEDLINE INDUSTRIES, INC.

## (undated) DEVICE — COLON KIT WITH 1.1 OZ ORCA HYDRA SEAL 2 GOWN

## (undated) DEVICE — SUPPLEMENT DIGESTIVE H2O SOL GI-EASE

## (undated) DEVICE — CLEANING SPONGE: Brand: KOALA™

## (undated) DEVICE — BRUSH ENDOSCP 2 END CHN HEDGEHOG

## (undated) DEVICE — ADAPTER CLEANING PORPOISE CLEANING